# Patient Record
Sex: FEMALE | Race: WHITE | NOT HISPANIC OR LATINO | Employment: OTHER | URBAN - METROPOLITAN AREA
[De-identification: names, ages, dates, MRNs, and addresses within clinical notes are randomized per-mention and may not be internally consistent; named-entity substitution may affect disease eponyms.]

---

## 2017-01-04 ENCOUNTER — OFFICE VISIT (OUTPATIENT)
Dept: CARDIOLOGY | Facility: MEDICAL CENTER | Age: 82
End: 2017-01-04
Payer: MEDICARE

## 2017-01-04 VITALS
SYSTOLIC BLOOD PRESSURE: 142 MMHG | HEIGHT: 62 IN | OXYGEN SATURATION: 95 % | WEIGHT: 116 LBS | HEART RATE: 72 BPM | BODY MASS INDEX: 21.35 KG/M2 | DIASTOLIC BLOOD PRESSURE: 82 MMHG

## 2017-01-04 DIAGNOSIS — I50.22 CHRONIC SYSTOLIC CONGESTIVE HEART FAILURE (HCC): Chronic | ICD-10-CM

## 2017-01-04 DIAGNOSIS — N18.30 CHRONIC KIDNEY DISEASE (CKD), STAGE III (MODERATE) (HCC): ICD-10-CM

## 2017-01-04 DIAGNOSIS — I35.0 NONRHEUMATIC AORTIC VALVE STENOSIS: ICD-10-CM

## 2017-01-04 DIAGNOSIS — I42.0 DILATED CARDIOMYOPATHY (HCC): ICD-10-CM

## 2017-01-04 DIAGNOSIS — I44.30 AV BLOCK: ICD-10-CM

## 2017-01-04 DIAGNOSIS — I25.10 CORONARY ARTERY DISEASE INVOLVING NATIVE CORONARY ARTERY OF NATIVE HEART WITHOUT ANGINA PECTORIS: Chronic | ICD-10-CM

## 2017-01-04 DIAGNOSIS — I34.0 MITRAL VALVE INSUFFICIENCY, UNSPECIFIED ETIOLOGY: ICD-10-CM

## 2017-01-04 DIAGNOSIS — I47.10 SVT (SUPRAVENTRICULAR TACHYCARDIA): ICD-10-CM

## 2017-01-04 DIAGNOSIS — Z95.0 CARDIAC PACEMAKER IN SITU: ICD-10-CM

## 2017-01-04 DIAGNOSIS — R00.1 BRADYCARDIA: ICD-10-CM

## 2017-01-04 PROCEDURE — G8427 DOCREV CUR MEDS BY ELIG CLIN: HCPCS | Performed by: INTERNAL MEDICINE

## 2017-01-04 PROCEDURE — G8599 NO ASA/ANTIPLAT THER USE RNG: HCPCS | Performed by: INTERNAL MEDICINE

## 2017-01-04 PROCEDURE — G8419 CALC BMI OUT NRM PARAM NOF/U: HCPCS | Performed by: INTERNAL MEDICINE

## 2017-01-04 PROCEDURE — 1036F TOBACCO NON-USER: CPT | Performed by: INTERNAL MEDICINE

## 2017-01-04 PROCEDURE — 99214 OFFICE O/P EST MOD 30 MIN: CPT | Performed by: INTERNAL MEDICINE

## 2017-01-04 ASSESSMENT — ENCOUNTER SYMPTOMS
MUSCULOSKELETAL NEGATIVE: 1
FEVER: 0
CARDIOVASCULAR NEGATIVE: 1
ORTHOPNEA: 0
STRIDOR: 0
PND: 0
WHEEZING: 0
HEMOPTYSIS: 0
GASTROINTESTINAL NEGATIVE: 1
CONSTITUTIONAL NEGATIVE: 1
RESPIRATORY NEGATIVE: 1
COUGH: 0
EYES NEGATIVE: 1
CHILLS: 0
NEUROLOGICAL NEGATIVE: 1
DIZZINESS: 0
PALPITATIONS: 0
BRUISES/BLEEDS EASILY: 0
SORE THROAT: 0
WEAKNESS: 0
CLAUDICATION: 0
LOSS OF CONSCIOUSNESS: 0
SPUTUM PRODUCTION: 0
SHORTNESS OF BREATH: 0

## 2017-01-04 NOTE — Clinical Note
[facility]   [address] [facility] [address]  [place] [address]  Phone: [phone]  Fax: [phone]              Jess Colón  11/13/1934    Encounter Date: 1/4/2017    Terry Tatum M.D.          PROGRESS NOTE:  Subjective:   Jess Colón is a 82 y.o. female who presents today as a follow-up for CAD and aortic stenosis. Since he was last seen she underwent a pacemaker placement for heart block. Since then she's feeling significantly better energy and less shortness of breath. Last echocardiogram showed mild to moderate aortic stenosis. She's had no clinical syndrome of this with no symptoms of progression of disease. She is scheduled for repeat echocardiogram in March.    Past Medical History   Diagnosis Date   • Hypertension    • Palpitations    • Abnormal liver function test    • Pacemaker    • Indigestion    • Myocardial infarct (HCC)     • Heart valve disease      mitral valve prolapse   • SVT (supraventricular tachycardia) (HCC)      SVT/A-Flutter   • Pneumonia 1964   • Bronchitis       10-15 years ago   • Breath shortness      with exertion or outdoor allergies   • Pain       feet secondary to RA=2/10   • Heart burn    • Snoring    • Rheumatoid arthritis(714.0)    • Arthritis      Rheumatoid Arthritis x 30 years   • Dental disorder      Full dentures   • Unspecified cataract      Surgery L eye   • Anesthesia      PONV     Past Surgical History   Procedure Laterality Date   • Tonsillectomy     • Hysterectomy, vaginal     • Breast biopsy     • Gastroscopy with biopsy  5/7/2014     Performed by Darin Barros M.D. at SURGERY [facility]   • Colonoscopy with clipping  5/7/2014     Performed by Darin Barros M.D. at [facility]   • Rectocele repair     • Recovery  11/4/2014     Performed by Cath-Recovery Surgery at [facility] SAME DAY [facility]   • Recovery  4/27/2016     Procedure: CATH LAB MELVIN WITH ANESTHESIA ;   "Surgeon: Recoveryonly Surgery;  Location: SURGERY PRE-POST PROC UNIT [facility];  Service:    • Recovery  5/19/2016     Procedure: CATH LAB [facility] WITH A SHOT OF THE AORTIC ROOT DR. NAYAK;  Surgeon: Recoveryonly Surgery;  Location: SURGERY PRE-POST PROC UNIT [facility];  Service:    • Pacemaker insertion  December 2016     Medtronic Adapta [id number] implanted by Dr. Ang.     Family History   Problem Relation Age of Onset   • Heart Disease Brother      History   Smoking status   • Never Smoker    Smokeless tobacco   • Never Used     Allergies   Allergen Reactions   • Methotrexate      Pt. States it effects her liver.   • Remicade [Infliximab Injection]      Shaking/hot-flashes   • Sulfites Diarrhea   • Other Environmental Runny Nose and Itching     Plants; \"everything\"     Outpatient Encounter Prescriptions as of 1/4/2017   Medication Sig Dispense Refill   • acetaminophen (TYLENOL) 500 MG Tab Take 2 Tabs by mouth every 6 hours as needed. 21 Tab 0   • furosemide (LASIX) 20 MG Tab TAKE 1 TABLET BY MOUTH DAILY 90 Tab 3   • lisinopril (PRINIVIL) 10 MG Tab Take 1 Tab by mouth every day. 30 Tab 11   • carvedilol (COREG) 12.5 MG Tab Take 1 Tab by mouth 2 times a day, with meals. 180 Tab 3   • potassium chloride ER (KLOR-CON) 10 MEQ tablet Take 1 Tab by mouth every day. 30 Tab 11   • alendronate (FOSAMAX) 70 MG Tab Take 70 mg by mouth every 7 days. On Mon     • estropipate (OGEN) 0.75 MG Tab Take 0.75 mg by mouth every evening.     • omeprazole (PRILOSEC) 20 MG delayed-release capsule Take 20 mg by mouth every day.     • tocilizumab (ACTEMRA) 400 MG/20ML Solution 400 mg by Intravenous route Q30 DAYS.     • Polyvinyl Alcohol-Povidone (REFRESH OP) 1 Drop by Ophthalmic route as needed (For Dry eyes).     • nitroglycerin (NITROSTAT) 0.4 MG SUBL Place 1 Tab under tongue as needed for Chest Pain. 25 Tab 11   • Calcium Carbonate-Vitamin D (CALTRATE 600+D PO) Take 1 Tab by mouth 2 Times a Day.     • Multiple Vitamins-Minerals (CENTRUM SILVER " "PO) Take 1 Tab by mouth every day.     • doxycycline (VIBRAMYCIN) 100 MG Tab Take 1 Tab by mouth 2 times a day. (Patient not taking: Reported on 1/4/2017) 6 Tab 0     No facility-administered encounter medications on file as of 1/4/2017.     Review of Systems   Constitutional: Negative.  Negative for fever, chills and malaise/fatigue.   HENT: Negative.  Negative for sore throat.    Eyes: Negative.    Respiratory: Negative.  Negative for cough, hemoptysis, sputum production, shortness of breath, wheezing and stridor.    Cardiovascular: Negative.  Negative for chest pain, palpitations, orthopnea, claudication, leg swelling and PND.   Gastrointestinal: Negative.    Genitourinary: Negative.    Musculoskeletal: Negative.    Skin: Negative.    Neurological: Negative.  Negative for dizziness, loss of consciousness and weakness.   Endo/Heme/Allergies: Negative.  Does not bruise/bleed easily.   All other systems reviewed and are negative.       Objective:   /82 mmHg  Pulse 72  Ht 1.575 m (5' 2\")  Wt 52.617 kg (116 lb)  BMI 21.21 kg/m2  SpO2 95%    Physical Exam   Constitutional: She is oriented to person, place, and time. She appears well-developed and well-nourished. No distress.   HENT:   Head: Normocephalic.   Mouth/Throat: Oropharynx is clear and moist.   Eyes: EOM are normal. Pupils are equal, round, and reactive to light. Right eye exhibits no discharge. Left eye exhibits no discharge. No scleral icterus.   Neck: Normal range of motion. Neck supple. No JVD present. No tracheal deviation present.   Cardiovascular: Normal rate, regular rhythm, S1 normal, S2 normal, intact distal pulses and normal pulses.  Exam reveals no gallop, no S3, no S4 and no friction rub.    Murmur heard.   No systolic murmur is present    No diastolic murmur is present   Pulses:       Carotid pulses are 2+ on the right side, and 2+ on the left side.       Radial pulses are 2+ on the right side, and 2+ on the left side.        Dorsalis " pedis pulses are 2+ on the right side, and 2+ on the left side.        Posterior tibial pulses are 2+ on the right side, and 2+ on the left side.   Pulmonary/Chest: Effort normal and breath sounds normal. No respiratory distress. She has no wheezes. She has no rales.   Abdominal: Soft. Bowel sounds are normal. She exhibits no distension and no mass. There is no tenderness. There is no rebound and no guarding.   Musculoskeletal: She exhibits no edema.   Neurological: She is alert and oriented to person, place, and time. No cranial nerve deficit.   Skin: Skin is warm and dry. She is not diaphoretic. No pallor.   Psychiatric: She has a normal mood and affect. Her behavior is normal. Judgment and thought content normal.   Nursing note and vitals reviewed.      Assessment:     1. AV block     2. Cardiac pacemaker in situ     3. Bradycardia     4. Dilated cardiomyopathy (HCC)     5. Chronic systolic congestive heart failure (HCC)     6. Mitral valve insufficiency, unspecified etiology     7. Nonrheumatic aortic valve stenosis     8. Coronary artery disease involving native coronary artery of native heart without angina pectoris     9. SVT (supraventricular tachycardia) (HCC)     10. Chronic kidney disease (CKD), stage III (moderate)         Medical Decision Making:  Today's Assessment / Status / Plan:     81-year-old female with aortic stenosis with low flow and lowgradients a did significantly increased with dobutamine infusion.  Since her pacemaker she continues to be asymptomatic. Based on her lack of symptoms we'll see her back in 6 months for further evaluation. She will undergo a repeat echocardiogram in March.    1. CHFrEF  - cont coreg to 12.5 BID  - cont lisin 20  - cont lasix 20 once daily    2. Aortic stenosis, moderate, low flow  - moderate  - repeat echo in March    3. MR  - likely function from dilated LV    4. HB s/p PPM    - f/u EP    Thank for you allowing me to take part in your patient's care, please  call should you have any questions or would like to discuss this patient.        Nick Mercer M.D.  [address] [place] [id number]  VIA In Basket

## 2017-01-04 NOTE — MR AVS SNAPSHOT
"        Jess Colón   2017 10:45 AM   Office Visit   MRN: [id number]    Department:  [facility]   Dept Phone:  [phone]    Description:  Female : 1934   Provider:  Terry Tatum M.D.           Reason for Visit     Follow-Up           Allergies as of 2017     Allergen Noted Reactions    Methotrexate 2013       Pt. States it effects her liver.    Remicade [Infliximab Injection] 2013       Shaking/hot-flashes    Sulfites 2014   Diarrhea    Other Environmental 2014   Runny Nose, Itching    Plants; \"everything\"      You were diagnosed with     AV block   [448675]       Cardiac pacemaker in situ   [V45.01.ICD-9-CM]       Bradycardia   [206686]       Dilated cardiomyopathy (HCC)   [885431]       Chronic systolic congestive heart failure (HCC)   [280698]       Mitral valve insufficiency, unspecified etiology   [7488421]       Nonrheumatic aortic valve stenosis   [503120]       Coronary artery disease involving native coronary artery of native heart without angina pectoris   [4635380]       SVT (supraventricular tachycardia) (HCC)   [946324]       Chronic kidney disease (CKD), stage III (moderate)   [589021]         Vital Signs     Blood Pressure Pulse Height Weight Body Mass Index Oxygen Saturation    142/82 mmHg 72 1.575 m (5' 2\") 52.617 kg (116 lb) 21.21 kg/m2 95%    Smoking Status                   Never Smoker            Basic Information     Date Of Birth Sex Race Ethnicity Preferred Language    1934 Female White Non- English      Your appointments     2017 11:15 AM   PACER CHECK ONLY with PACER CHECK-[facility]   [facility] (--)    [address]  [place] [address] [phone]            Mar 20, 2017 12:15 PM   [place] with [place] [facility] EXAM 12   [place] [facility] [place]    [address] [facility] [place] [address]   [phone]           No prep            2017  4:00 PM   Heart " Failure Established with Terry Tatum M.D.   [facility] (--)    [address]  [place] [id number] [phone]              Problem List              ICD-10-CM Priority Class Noted - Resolved    Hyponatremia E87.1   4/30/2013 - Present    Anemia D64.9   5/7/2014 - Present    RA (rheumatoid arthritis) (HCC) M06.9   5/7/2014 - Present    Leukocytosis D72.829   5/7/2014 - Present    Chronic kidney disease (CKD), stage III (moderate) N18.3   5/7/2014 - Present    Hypokalemia E87.6   8/26/2014 - Present    SVT (supraventricular tachycardia) (HCC) I47.1 Medium  8/26/2014 - Present    CAD (coronary artery disease) (Chronic) I25.10   10/23/2014 - Present    Chest pain R07.9   10/30/2014 - Present    Other specified cardiac dysrhythmias I49.8   11/4/2014 - Present    Gastroesophageal reflux disease without esophagitis (Chronic) K21.9   6/3/2015 - Present    Osteoporosis M81.0   6/3/2015 - Present    Routine general medical examination at a health care facility Z00.00   12/9/2015 - Present    Essential hypertension (Chronic) I10 Low  12/9/2015 - Present    Low serum vitamin D R79.89   12/9/2015 - Present    History of MI (myocardial infarction) I25.2   12/17/2015 - Present    Aortic stenosis I35.0 High  4/14/2016 - Present    Mitral regurgitation I34.0 Medium  4/14/2016 - Present    Chronic systolic congestive heart failure (HCC) (Chronic) I50.22 High  6/1/2016 - Present    Dilated cardiomyopathy (HCC) I42.0 High  6/1/2016 - Present    Partial bowel obstruction (HCC) K56.69   9/5/2016 - Present    Bradycardia R00.1   11/29/2016 - Present    Cardiac pacemaker in situ Z95.0   12/13/2016 - Present    AV block I44.30   12/13/2016 - Present      Health Maintenance        Date Due Completion Dates    IMM DTaP/Tdap/Td Vaccine (1 - Tdap) 11/13/1953 ---    PAP SMEAR 11/13/1955 ---    IMM ZOSTER VACCINE 11/13/1994 ---    IMM PNEUMOCOCCAL 65+ (ADULT) LOW/MEDIUM RISK SERIES (1  of 2 - PCV13) 11/13/1999 ---    MAMMOGRAM 12/17/2016 12/17/2015 (Postponed)    Override on 12/17/2015: Postponed (Patient states she will schedule with her PCP.)    BONE DENSITY 12/17/2020 12/17/2015 (Postponed)    Override on 12/17/2015: Postponed (Patient will discuss scheduling test with her PCP.)    COLONOSCOPY 12/17/2025 12/17/2015 (Postponed)    Override on 12/17/2015: Postponed (Patient states she had colonoscopy while in [place] in 2014.)            Current Immunizations     Influenza TIV (IM) 10/1/2015, 10/2/2014, 10/1/2013    Influenza Vaccine Adult HD 12/6/2016  9:49 AM    Pneumococcal Vaccine (UF)Historical Data 10/1/2015, 10/1/2012      Below and/or attached are the medications your provider expects you to take. Review all of your home medications and newly ordered medications with your provider and/or pharmacist. Follow medication instructions as directed by your provider and/or pharmacist. Please keep your medication list with you and share with your provider. Update the information when medications are discontinued, doses are changed, or new medications (including over-the-counter products) are added; and carry medication information at all times in the event of emergency situations     Allergies:  METHOTREXATE - (reactions not documented)     REMICADE - (reactions not documented)     SULFITES - Diarrhea     OTHER ENVIRONMENTAL - Runny Nose,Itching               Medications  Valid as of: January 04, 2017 - 11:20 AM    Generic Name Brand Name Tablet Size Instructions for use    Acetaminophen (Tab) TYLENOL 500 MG Take 2 Tabs by mouth every 6 hours as needed.        Alendronate Sodium (Tab) FOSAMAX 70 MG Take 70 mg by mouth every 7 days. On Mon        Calcium Carbonate-Vitamin D   Take 1 Tab by mouth 2 Times a Day.        Carvedilol (Tab) COREG 12.5 MG Take 1 Tab by mouth 2 times a day, with meals.        Doxycycline Hyclate (Tab) VIBRAMYCIN 100 MG Take 1 Tab by mouth 2 times a day.        Estropipate  (Tab) OGEN 0.75 MG Take 0.75 mg by mouth every evening.        Furosemide (Tab) LASIX 20 MG TAKE 1 TABLET BY MOUTH DAILY        Lisinopril (Tab) PRINIVIL 10 MG Take 1 Tab by mouth every day.        Multiple Vitamins-Minerals   Take 1 Tab by mouth every day.        Nitroglycerin (SL Tab) NITROSTAT 0.4 MG Place 1 Tab under tongue as needed for Chest Pain.        Omeprazole (CAPSULE DELAYED RELEASE) PRILOSEC 20 MG Take 20 mg by mouth every day.        Polyvinyl Alcohol-Povidone   1 Drop by Ophthalmic route as needed (For Dry eyes).        Potassium Chloride (Tab CR) KLOR-CON 10 MEQ Take 1 Tab by mouth every day.        Tocilizumab (Solution) ACTEMRA 400 MG/20ML 400 mg by Intravenous route Q30 DAYS.        .                 Medicines prescribed today were sent to:     [facility] - [address] [facility] [address]    Phone: [phone] Fax: [phone]    Open 24 Hours?: No    [facility] [address] [place] [address] [address] [facility] [address]    Phone: [phone] Fax: [phone]    Open 24 Hours?: No      Medication refill instructions:       If your prescription bottle indicates you have medication refills left, it is not necessary to call your provider’s office. Please contact your pharmacy and they will refill your medication.    If your prescription bottle indicates you do not have any refills left, you may request refills at any time through one of the following ways: The online [employer] system (except Urgent Care), by calling your provider’s office, or by asking your pharmacy to contact your provider’s office with a refill request. Medication refills are processed only during regular business hours and may not be available until the next business day. Your provider may request additional information or to have a follow-up visit with you prior to refilling your medication.      *Please Note: Medication refills are assigned a new Rx number when refilled electronically. Your pharmacy may indicate that no refills were authorized even though a new prescription for the same medication is available at the pharmacy. Please request the medicine by name with the pharmacy before contacting your provider for a refill.           [employer] Access Code: [email or web address]  Expires: 2/3/2017 11:20 AM    [employer]  A secure, online tool to manage your health information     [employer]’s [employer]® is a secure, online tool that connects you to your personalized health information from the privacy of your home -- day or night - making it very easy for you to manage your healthcare. Once the activation process is completed, you can even access your medical information using the [employer] carina, which is available for free in the Apple Carina store or Google Play store.     [employer] provides the following levels of access (as shown below):   My Chart Features   [facility] Doctor [facility]  Specialists [facility]  Urgent  Care Non-[facility]  Primary Care  Doctor   Email your healthcare team securely and privately 24/7 X X X    Manage appointments: schedule your next appointment; view details of past/upcoming appointments X      Request prescription refills. X      View recent personal medical records, including lab and immunizations X X X X   View health record, including health history, allergies, medications X X X X   Read reports about your outpatient visits, procedures, consult and ER notes X X X X   See your discharge summary, which is a recap of your hospital and/or ER visit that includes your diagnosis, lab results, and care plan. X X       How to register for [employer]:  1. Go to  [email or web address].  2. Click on the Sign Up Now box, which takes you to the New Member Sign Up page. You will need to provide the following information:  a. Enter your [employer] Access Code exactly as it appears at the top of this  page. (You will not need to use this code after you’ve completed the sign-up process. If you do not sign up before the expiration date, you must request a new code.)   b. Enter your date of birth.   c. Enter your home email address.   d. Click Submit, and follow the next screen’s instructions.  3. Create a [employer] ID. This will be your [employer] login ID and cannot be changed, so think of one that is secure and easy to remember.  4. Create a [employer] password. You can change your password at any time.  5. Enter your Password Reset Question and Answer. This can be used at a later time if you forget your password.   6. Enter your e-mail address. This allows you to receive e-mail notifications when new information is available in [employer].  7. Click Sign Up. You can now view your health information.    For assistance activating your [employer] account, call [phone]

## 2017-01-04 NOTE — PROGRESS NOTES
Subjective:   Jess Colón is a 82 y.o. female who presents today as a follow-up for CAD and aortic stenosis. Since he was last seen she underwent a pacemaker placement for heart block. Since then she's feeling significantly better energy and less shortness of breath. Last echocardiogram showed mild to moderate aortic stenosis. She's had no clinical syndrome of this with no symptoms of progression of disease. She is scheduled for repeat echocardiogram in March.    Past Medical History   Diagnosis Date   • Hypertension    • Palpitations    • Abnormal liver function test    • Pacemaker    • Indigestion    • Myocardial infarct (HCC)     • Heart valve disease      mitral valve prolapse   • SVT (supraventricular tachycardia) ([facility])      SVT/A-Flutter   • Pneumonia 1964   • Bronchitis       10-15 years ago   • Breath shortness      with exertion or outdoor allergies   • Pain       feet secondary to RA=2/10   • Heart burn    • Snoring    • Rheumatoid arthritis(714.0)    • Arthritis      Rheumatoid Arthritis x 30 years   • Dental disorder      Full dentures   • Unspecified cataract      Surgery L eye   • Anesthesia      PONV     Past Surgical History   Procedure Laterality Date   • Tonsillectomy     • Hysterectomy, vaginal     • Breast biopsy     • Gastroscopy with biopsy  5/7/2014     Performed by Darin Barros M.D. at [facility]   • Colonoscopy with clipping  5/7/2014     Performed by Darin Barros M.D. at [facility]   • Rectocele repair     • Recovery  11/4/2014     Performed by Cath-Recovery Surgery at [facility] SAME DAY [facility] ORS   • Recovery  4/27/2016     Procedure: CATH LAB MELVIN WITH ANESTHESIA ;  Surgeon: Recoveryonly Surgery;  Location: SURGERY PRE-POST PROC UNIT [facility];  Service:    • Recovery  5/19/2016     Procedure: CATH LAB C, [facility] WITH A SHOT OF THE AORTIC ROOT DR. NAYAK;  Surgeon: Recoveryonly Surgery;  Location: SURGERY PRE-POST PROC UNIT [facility];  Service:   "  • Pacemaker insertion  December 2016     Medtronic Adapta [id number] implanted by Dr. Ang.     Family History   Problem Relation Age of Onset   • Heart Disease Brother      History   Smoking status   • Never Smoker    Smokeless tobacco   • Never Used     Allergies   Allergen Reactions   • Methotrexate      Pt. States it effects her liver.   • Remicade [Infliximab Injection]      Shaking/hot-flashes   • Sulfites Diarrhea   • Other Environmental Runny Nose and Itching     Plants; \"everything\"     Outpatient Encounter Prescriptions as of 1/4/2017   Medication Sig Dispense Refill   • acetaminophen (TYLENOL) 500 MG Tab Take 2 Tabs by mouth every 6 hours as needed. 21 Tab 0   • furosemide (LASIX) 20 MG Tab TAKE 1 TABLET BY MOUTH DAILY 90 Tab 3   • lisinopril (PRINIVIL) 10 MG Tab Take 1 Tab by mouth every day. 30 Tab 11   • carvedilol (COREG) 12.5 MG Tab Take 1 Tab by mouth 2 times a day, with meals. 180 Tab 3   • potassium chloride ER (KLOR-CON) 10 MEQ tablet Take 1 Tab by mouth every day. 30 Tab 11   • alendronate (FOSAMAX) 70 MG Tab Take 70 mg by mouth every 7 days. On Mon     • estropipate (OGEN) 0.75 MG Tab Take 0.75 mg by mouth every evening.     • omeprazole (PRILOSEC) 20 MG delayed-release capsule Take 20 mg by mouth every day.     • tocilizumab (ACTEMRA) 400 MG/20ML Solution 400 mg by Intravenous route Q30 DAYS.     • Polyvinyl Alcohol-Povidone (REFRESH OP) 1 Drop by Ophthalmic route as needed (For Dry eyes).     • nitroglycerin (NITROSTAT) 0.4 MG SUBL Place 1 Tab under tongue as needed for Chest Pain. 25 Tab 11   • Calcium Carbonate-Vitamin D (CALTRATE 600+D PO) Take 1 Tab by mouth 2 Times a Day.     • Multiple Vitamins-Minerals (CENTRUM SILVER PO) Take 1 Tab by mouth every day.     • doxycycline (VIBRAMYCIN) 100 MG Tab Take 1 Tab by mouth 2 times a day. (Patient not taking: Reported on 1/4/2017) 6 Tab 0     No facility-administered encounter medications on file as of 1/4/2017.     Review of Systems " "  Constitutional: Negative.  Negative for fever, chills and malaise/fatigue.   HENT: Negative.  Negative for sore throat.    Eyes: Negative.    Respiratory: Negative.  Negative for cough, hemoptysis, sputum production, shortness of breath, wheezing and stridor.    Cardiovascular: Negative.  Negative for chest pain, palpitations, orthopnea, claudication, leg swelling and PND.   Gastrointestinal: Negative.    Genitourinary: Negative.    Musculoskeletal: Negative.    Skin: Negative.    Neurological: Negative.  Negative for dizziness, loss of consciousness and weakness.   Endo/Heme/Allergies: Negative.  Does not bruise/bleed easily.   All other systems reviewed and are negative.       Objective:   /82 mmHg  Pulse 72  Ht 1.575 m (5' 2\")  Wt 52.617 kg (116 lb)  BMI 21.21 kg/m2  SpO2 95%    Physical Exam   Constitutional: She is oriented to person, place, and time. She appears well-developed and well-nourished. No distress.   HENT:   Head: Normocephalic.   Mouth/Throat: Oropharynx is clear and moist.   Eyes: EOM are normal. Pupils are equal, round, and reactive to light. Right eye exhibits no discharge. Left eye exhibits no discharge. No scleral icterus.   Neck: Normal range of motion. Neck supple. No JVD present. No tracheal deviation present.   Cardiovascular: Normal rate, regular rhythm, S1 normal, S2 normal, intact distal pulses and normal pulses.  Exam reveals no gallop, no S3, no S4 and no friction rub.    Murmur heard.   No systolic murmur is present    No diastolic murmur is present   Pulses:       Carotid pulses are 2+ on the right side, and 2+ on the left side.       Radial pulses are 2+ on the right side, and 2+ on the left side.        Dorsalis pedis pulses are 2+ on the right side, and 2+ on the left side.        Posterior tibial pulses are 2+ on the right side, and 2+ on the left side.   Pulmonary/Chest: Effort normal and breath sounds normal. No respiratory distress. She has no wheezes. She has no " rales.   Abdominal: Soft. Bowel sounds are normal. She exhibits no distension and no mass. There is no tenderness. There is no rebound and no guarding.   Musculoskeletal: She exhibits no edema.   Neurological: She is alert and oriented to person, place, and time. No cranial nerve deficit.   Skin: Skin is warm and dry. She is not diaphoretic. No pallor.   Psychiatric: She has a normal mood and affect. Her behavior is normal. Judgment and thought content normal.   Nursing note and vitals reviewed.      Assessment:     1. AV block     2. Cardiac pacemaker in situ     3. Bradycardia     4. Dilated cardiomyopathy (HCC)     5. Chronic systolic congestive heart failure (HCC)     6. Mitral valve insufficiency, unspecified etiology     7. Nonrheumatic aortic valve stenosis     8. Coronary artery disease involving native coronary artery of native heart without angina pectoris     9. SVT (supraventricular tachycardia) (HCC)     10. Chronic kidney disease (CKD), stage III (moderate)         Medical Decision Making:  Today's Assessment / Status / Plan:     81-year-old female with aortic stenosis with low flow and lowgradients a did significantly increased with dobutamine infusion.  Since her pacemaker she continues to be asymptomatic. Based on her lack of symptoms we'll see her back in 6 months for further evaluation. She will undergo a repeat echocardiogram in March.    1. CHFrEF  - cont coreg to 12.5 BID  - cont lisin 20  - cont lasix 20 once daily    2. Aortic stenosis, moderate, low flow  - moderate  - repeat echo in March    3. MR  - likely function from dilated LV    4. HB s/p PPM    - f/u EP    Thank for you allowing me to take part in your patient's care, please call should you have any questions or would like to discuss this patient.

## 2017-01-19 ENCOUNTER — NON-PROVIDER VISIT (OUTPATIENT)
Dept: CARDIOLOGY | Facility: MEDICAL CENTER | Age: 82
End: 2017-01-19
Payer: MEDICARE

## 2017-01-19 DIAGNOSIS — R00.1 BRADYCARDIA: ICD-10-CM

## 2017-01-19 DIAGNOSIS — I44.30 AV BLOCK: ICD-10-CM

## 2017-01-19 PROCEDURE — 93280 PM DEVICE PROGR EVAL DUAL: CPT | Performed by: INTERNAL MEDICINE

## 2017-01-19 NOTE — MR AVS SNAPSHOT
"        Jess Eldon   2017 11:15 AM   Appointment   MRN: [id number]    Department:  [facility]   Dept Phone:  [phone]    Description:  Female : 1934   Provider:  [facility]           Allergies as of 2017     Allergen Noted Reactions    Methotrexate 2013       Pt. States it effects her liver.    Remicade [Infliximab Injection] 2013       Shaking/hot-flashes    Sulfites 2014   Diarrhea    Other Environmental 2014   Runny Nose, Itching    Plants; \"everything\"      Vital Signs     Smoking Status                   Never Smoker            Basic Information     Date Of Birth Sex Race Ethnicity Preferred Language    1934 Female White Non- English      Your appointments     Mar 20, 2017 12:15 PM   ECHO with ECHO [facility] EXAM 12   ECHOCARDIOLOGY [facility] ([facility])    [address] [place] [address]   [phone]           No prep            2017  4:00 PM   Heart Failure Established with Terry Tatum M.D.   [facility] (--)    [address]  [place] [id number] [phone]              Problem List              ICD-10-CM Priority Class Noted - Resolved    Hyponatremia E87.1   2013 - Present    Anemia D64.9   2014 - Present    RA (rheumatoid arthritis) (CMS-HCC) M06.9   2014 - Present    Leukocytosis D72.829   2014 - Present    Chronic kidney disease (CKD), stage III (moderate) N18.3   2014 - Present    Hypokalemia E87.6   2014 - Present    SVT (supraventricular tachycardia) (CMS-HCC) I47.1 Medium  2014 - Present    CAD (coronary artery disease) (Chronic) I25.10   10/23/2014 - Present    Chest pain R07.9   10/30/2014 - Present    Other specified cardiac dysrhythmias I49.8   2014 - Present    Gastroesophageal reflux disease without esophagitis (Chronic) K21.9   6/3/2015 - Present    Osteoporosis M81.0   6/3/2015 - Present    Routine general medical " examination at a health care facility Z00.00   12/9/2015 - Present    Essential hypertension (Chronic) I10 Low  12/9/2015 - Present    Low serum vitamin D R79.89   12/9/2015 - Present    History of MI (myocardial infarction) I25.2   12/17/2015 - Present    Aortic stenosis I35.0 High  4/14/2016 - Present    Mitral regurgitation I34.0 Medium  4/14/2016 - Present    Chronic systolic congestive heart failure (CMS-HCC) (Chronic) I50.22 High  6/1/2016 - Present    Dilated cardiomyopathy (CMS-[facility]) I42.0 High  6/1/2016 - Present    Partial bowel obstruction (CMS-[facility]) K56.69   9/5/2016 - Present    Bradycardia R00.1   11/29/2016 - Present    Cardiac pacemaker in situ Z95.0   12/13/2016 - Present    AV block I44.30   12/13/2016 - Present      Health Maintenance        Date Due Completion Dates    IMM DTaP/Tdap/Td Vaccine (1 - Tdap) 11/13/1953 ---    PAP SMEAR 11/13/1955 ---    IMM ZOSTER VACCINE 11/13/1994 ---    IMM PNEUMOCOCCAL 65+ (ADULT) LOW/MEDIUM RISK SERIES (1 of 2 - PCV13) 11/13/1999 ---    MAMMOGRAM 12/17/2016 12/17/2015 (Postponed)    Override on 12/17/2015: Postponed (Patient states she will schedule with her PCP.)    BONE DENSITY 12/17/2020 12/17/2015 (Postponed)    Override on 12/17/2015: Postponed (Patient will discuss scheduling test with her PCP.)    COLONOSCOPY 12/17/2025 12/17/2015 (Postponed)    Override on 12/17/2015: Postponed (Patient states she had colonoscopy while in [facility] in 2014.)            Current Immunizations     Influenza TIV (IM) 10/1/2015, 10/2/2014, 10/1/2013    Influenza Vaccine Adult HD 12/6/2016  9:49 AM    Pneumococcal Vaccine (UF)Historical Data 10/1/2015, 10/1/2012      Below and/or attached are the medications your provider expects you to take. Review all of your home medications and newly ordered medications with your provider and/or pharmacist. Follow medication instructions as directed by your provider and/or pharmacist. Please keep your medication list with you and share with  your provider. Update the information when medications are discontinued, doses are changed, or new medications (including over-the-counter products) are added; and carry medication information at all times in the event of emergency situations     Allergies:  METHOTREXATE - (reactions not documented)     REMICADE - (reactions not documented)     SULFITES - Diarrhea     OTHER ENVIRONMENTAL - Runny Nose,Itching               Medications  Valid as of: January 19, 2017 - 11:27 AM    Generic Name Brand Name Tablet Size Instructions for use    Acetaminophen (Tab) TYLENOL 500 MG Take 2 Tabs by mouth every 6 hours as needed.        Alendronate Sodium (Tab) FOSAMAX 70 MG Take 70 mg by mouth every 7 days. On Mon        Calcium Carbonate-Vitamin D   Take 1 Tab by mouth 2 Times a Day.        Carvedilol (Tab) COREG 12.5 MG Take 1 Tab by mouth 2 times a day, with meals.        Doxycycline Hyclate (Tab) VIBRAMYCIN 100 MG Take 1 Tab by mouth 2 times a day.        Estropipate (Tab) OGEN 0.75 MG Take 1 tablet by mouth  every day        Furosemide (Tab) LASIX 20 MG TAKE 1 TABLET BY MOUTH DAILY        Lisinopril (Tab) PRINIVIL 10 MG Take 1 Tab by mouth every day.        Multiple Vitamins-Minerals   Take 1 Tab by mouth every day.        Nitroglycerin (SL Tab) NITROSTAT 0.4 MG Place 1 Tab under tongue as needed for Chest Pain.        Omeprazole (CAPSULE DELAYED RELEASE) PRILOSEC 20 MG Take 1 capsule by mouth  every morning on an empty  stomach        Polyvinyl Alcohol-Povidone   1 Drop by Ophthalmic route as needed (For Dry eyes).        Potassium Chloride (Tab CR) KLOR-CON 10 MEQ Take 1 Tab by mouth every day.        Tocilizumab (Solution) ACTEMRA 400 MG/20ML 400 mg by Intravenous route Q30 DAYS.        .                 Medicines prescribed today were sent to:     [facility] - [address]    [address] [place] [address] [address] [facility] [address]    Phone: [phone] Fax: [phone]    Open 24 Hours?:  No    [id number] - [place], [place] - [address] AT [facility] [address]    [address] [place] [address]    Phone: [phone] Fax: [phone]    Open 24 Hours?: No      Medication refill instructions:       If your prescription bottle indicates you have medication refills left, it is not necessary to call your provider’s office. Please contact your pharmacy and they will refill your medication.    If your prescription bottle indicates you do not have any refills left, you may request refills at any time through one of the following ways: The online eCullet system (except Urgent Care), by calling your provider’s office, or by asking your pharmacy to contact your provider’s office with a refill request. Medication refills are processed only during regular business hours and may not be available until the next business day. Your provider may request additional information or to have a follow-up visit with you prior to refilling your medication.   *Please Note: Medication refills are assigned a new Rx number when refilled electronically. Your pharmacy may indicate that no refills were authorized even though a new prescription for the same medication is available at the pharmacy. Please request the medicine by name with the pharmacy before contacting your provider for a refill.           eCullet Access Code: [email or web address]  Expires: 2/3/2017 11:20 AM    eCullet  A secure, online tool to manage your health information     [employer]’s eCullet® is a secure, online tool that connects you to your personalized health information from the privacy of your home -- day or night - making it very easy for you to manage your healthcare. Once the activation process is completed, you can even access your medical information using the eCullet suman, which is available for free in the [employer] store or Google Play store.     eCullet provides the following levels of access (as shown below):    My Chart Features   [facility] Doctor [facility]  Specialists [facility]  Urgent  Care Non-[facility]  Primary Care  Doctor   Email your healthcare team securely and privately 24/7 X X X    Manage appointments: schedule your next appointment; view details of past/upcoming appointments X      Request prescription refills. X      View recent personal medical records, including lab and immunizations X X X X   View health record, including health history, allergies, medications X X X X   Read reports about your outpatient visits, procedures, consult and ER notes X X X X   See your discharge summary, which is a recap of your hospital and/or ER visit that includes your diagnosis, lab results, and care plan. X X       How to register for [employer]:  1. Go to  [email or web address].  2. Click on the Sign Up Now box, which takes you to the New Member Sign Up page. You will need to provide the following information:  a. Enter your [employer] Access Code exactly as it appears at the top of this page. (You will not need to use this code after you’ve completed the sign-up process. If you do not sign up before the expiration date, you must request a new code.)   b. Enter your date of birth.   c. Enter your home email address.   d. Click Submit, and follow the next screen’s instructions.  3. Create a [employer] ID. This will be your [employer] login ID and cannot be changed, so think of one that is secure and easy to remember.  4. Create a [employer] password. You can change your password at any time.  5. Enter your Password Reset Question and Answer. This can be used at a later time if you forget your password.   6. Enter your e-mail address. This allows you to receive e-mail notifications when new information is available in [employer].  7. Click Sign Up. You can now view your health information.    For assistance activating your [employer] account, call [phone]

## 2017-01-19 NOTE — NON-PROVIDER
Final threshold testing today. Appropriate device function demonstrated. Wound site appears clear. See back in 6 months along with Dr. Tatum.

## 2017-03-08 ENCOUNTER — TELEPHONE (OUTPATIENT)
Dept: CARDIOLOGY | Facility: MEDICAL CENTER | Age: 82
End: 2017-03-08

## 2017-03-08 NOTE — TELEPHONE ENCOUNTER
----- Message from Sameera Rios sent at 3/8/2017  8:48 AM PST -----  Regarding: daughter wants to review her hospital records from 12/16  МАРИЯ/Sheila      Patient's daughter Virginie wants to review her medical records from her hospitalization in 12/2016. She said there are discrepancies. She can be reached at [phone] before 11am or after 3pm.

## 2017-03-08 NOTE — TELEPHONE ENCOUNTER
Spoke with them advising admission was 12/5 and dc was 12/6, PM are always overnight, yes it was likley an obs status.

## 2017-03-20 ENCOUNTER — HOSPITAL ENCOUNTER (OUTPATIENT)
Dept: CARDIOLOGY | Facility: MEDICAL CENTER | Age: 82
End: 2017-03-20
Attending: INTERNAL MEDICINE
Payer: MEDICARE

## 2017-03-20 DIAGNOSIS — I35.0 AORTIC STENOSIS: ICD-10-CM

## 2017-03-20 PROCEDURE — 93306 TTE W/DOPPLER COMPLETE: CPT

## 2017-03-20 PROCEDURE — 93306 TTE W/DOPPLER COMPLETE: CPT | Mod: 26 | Performed by: INTERNAL MEDICINE

## 2017-03-21 ENCOUNTER — TELEPHONE (OUTPATIENT)
Dept: CARDIOLOGY | Facility: MEDICAL CENTER | Age: 82
End: 2017-03-21

## 2017-03-21 LAB
LV EJECT FRACT  99904: 60
LV EJECT FRACT MOD 2C 99903: 53.25
LV EJECT FRACT MOD 4C 99902: 42.18
LV EJECT FRACT MOD BP 99901: 44.25

## 2017-03-21 NOTE — TELEPHONE ENCOUNTER
Called patient and advised her of ELYSE LOPEZ's echocardiogram interpretation. Patient verbalized understanding and is thankful for the call.    JAX BECKER

## 2017-07-04 DIAGNOSIS — I25.10 CORONARY ARTERY DISEASE INVOLVING NATIVE CORONARY ARTERY OF NATIVE HEART WITHOUT ANGINA PECTORIS: ICD-10-CM

## 2017-07-05 RX ORDER — POTASSIUM CHLORIDE 750 MG/1
TABLET, FILM COATED, EXTENDED RELEASE ORAL
Qty: 90 TAB | Refills: 3 | Status: SHIPPED | OUTPATIENT
Start: 2017-07-05 | End: 2018-07-10 | Stop reason: SDUPTHER

## 2017-07-05 RX ORDER — LISINOPRIL 10 MG/1
TABLET ORAL
Qty: 90 TAB | Refills: 3 | Status: SHIPPED | OUTPATIENT
Start: 2017-07-05 | End: 2018-04-04 | Stop reason: SDUPTHER

## 2017-07-11 ENCOUNTER — NON-PROVIDER VISIT (OUTPATIENT)
Dept: CARDIOLOGY | Facility: MEDICAL CENTER | Age: 82
End: 2017-07-11
Payer: MEDICARE

## 2017-07-11 ENCOUNTER — OFFICE VISIT (OUTPATIENT)
Dept: CARDIOLOGY | Facility: MEDICAL CENTER | Age: 82
End: 2017-07-11
Payer: MEDICARE

## 2017-07-11 VITALS
HEART RATE: 84 BPM | WEIGHT: 117 LBS | BODY MASS INDEX: 21.53 KG/M2 | SYSTOLIC BLOOD PRESSURE: 138 MMHG | OXYGEN SATURATION: 97 % | HEIGHT: 62 IN | DIASTOLIC BLOOD PRESSURE: 92 MMHG

## 2017-07-11 DIAGNOSIS — R00.1 BRADYCARDIA: ICD-10-CM

## 2017-07-11 DIAGNOSIS — D64.9 ANEMIA, UNSPECIFIED TYPE: ICD-10-CM

## 2017-07-11 DIAGNOSIS — I25.2 HISTORY OF MI (MYOCARDIAL INFARCTION): ICD-10-CM

## 2017-07-11 DIAGNOSIS — N18.30 CHRONIC KIDNEY DISEASE (CKD), STAGE III (MODERATE) (HCC): ICD-10-CM

## 2017-07-11 DIAGNOSIS — I10 ESSENTIAL HYPERTENSION: Chronic | ICD-10-CM

## 2017-07-11 DIAGNOSIS — I42.0 DILATED CARDIOMYOPATHY (HCC): ICD-10-CM

## 2017-07-11 DIAGNOSIS — K56.600 PARTIAL BOWEL OBSTRUCTION (HCC): ICD-10-CM

## 2017-07-11 DIAGNOSIS — I25.10 CORONARY ARTERY DISEASE INVOLVING NATIVE CORONARY ARTERY OF NATIVE HEART WITHOUT ANGINA PECTORIS: Chronic | ICD-10-CM

## 2017-07-11 DIAGNOSIS — I44.30 AV BLOCK: ICD-10-CM

## 2017-07-11 DIAGNOSIS — Z95.0 CARDIAC PACEMAKER IN SITU: ICD-10-CM

## 2017-07-11 DIAGNOSIS — I35.0 NONRHEUMATIC AORTIC VALVE STENOSIS: ICD-10-CM

## 2017-07-11 DIAGNOSIS — I50.22 CHRONIC SYSTOLIC CONGESTIVE HEART FAILURE (HCC): Chronic | ICD-10-CM

## 2017-07-11 PROCEDURE — 93280 PM DEVICE PROGR EVAL DUAL: CPT | Performed by: INTERNAL MEDICINE

## 2017-07-11 PROCEDURE — 99214 OFFICE O/P EST MOD 30 MIN: CPT | Mod: 25 | Performed by: INTERNAL MEDICINE

## 2017-07-11 PROCEDURE — 94620 PR PULMONARY STRESS TESTING,SIMPLE: CPT | Performed by: INTERNAL MEDICINE

## 2017-07-11 ASSESSMENT — MINNESOTA LIVING WITH HEART FAILURE QUESTIONNAIRE (MLHF)
COSTING YOU MONEY FOR MEDICAL CARE: 0
DIFFICULTY WITH RECREATIONAL PASTIMES, SPORTS, HOBBIES: 0
DIFFICULTY SLEEPING WELL AT NIGHT: 0
LOSS OF SELF CONTROL IN YOUR LIFE: 0
WORKING AROUND THE HOUSE OR YARD DIFFICULT: 0
TIRED, FATIGUED OR LOW ON ENERGY: 0
DIFFICULTY SOCIALIZING WITH FAMILY OR FRIENDS: 0
DIFFICULTY GOING AWAY FROM HOME: 0
SWELLING IN ANKLES OR LEGS: 0
EATING LESS FOODS YOU LIKE: 0
DIFFICULTY WITH SEXUAL ACTIVITIES: 0
DIFFICULTY WORKING TO EARN A LIVING: 0
FEELING LIKE A BURDEN TO FAMILY AND FRIENDS: 0
MAKING YOU SHORT OF BREATH: 0
MAKING YOU STAY IN A HOSPITAL: 0
MAKING YOU WORRY: 0
DIFFICULTY TO CONCENTRATE OR REMEMBERING THINGS: 0
HAVING TO SIT OR LIE DOWN DURING THE DAY: 0
MAKING YOU FEEL DEPRESSED: 0
TOTAL_SCORE: 0
WALKING ABOUT OR CLIMBING STAIRS DIFFICULT: 0
GIVING YOU SIDE EFFECTS FROM TREATMENTS: 0

## 2017-07-11 ASSESSMENT — ENCOUNTER SYMPTOMS
GASTROINTESTINAL NEGATIVE: 1
DIZZINESS: 0
NEUROLOGICAL NEGATIVE: 1
CARDIOVASCULAR NEGATIVE: 1
STRIDOR: 0
PALPITATIONS: 0
SHORTNESS OF BREATH: 0
ORTHOPNEA: 0
LOSS OF CONSCIOUSNESS: 0
CONSTITUTIONAL NEGATIVE: 1
HEMOPTYSIS: 0
PND: 0
COUGH: 0
MUSCULOSKELETAL NEGATIVE: 1
CLAUDICATION: 0
SPUTUM PRODUCTION: 0
FEVER: 0
RESPIRATORY NEGATIVE: 1
BRUISES/BLEEDS EASILY: 0
CHILLS: 0
SORE THROAT: 0
WEAKNESS: 0
EYES NEGATIVE: 1
WHEEZING: 0

## 2017-07-11 ASSESSMENT — 6 MINUTE WALK TEST (6MWT): TOTAL DISTANCE WALKED (METERS): 359

## 2017-07-11 NOTE — MR AVS SNAPSHOT
"Jess Colón   2017 4:00 PM   Office Visit   MRN: [id number]    Department:  [facility]   Dept Phone:  [phone]    Description:  Female : 1934   Provider:  Terry Tatum M.D.           Reason for Visit     Follow-Up           Allergies as of 2017     Allergen Noted Reactions    Methotrexate 2013       Pt. States it effects her liver.    Remicade [Infliximab Injection] 2013       Shaking/hot-flashes    Sulfites 2014   Diarrhea    Other Environmental 2014   Runny Nose, Itching    Plants; \"everything\"      Vital Signs     Blood Pressure Pulse Height Weight Body Mass Index Oxygen Saturation    138/92 mmHg 84 1.575 m (5' 2\") 53.071 kg (117 lb) 21.39 kg/m2 97%    Smoking Status                   Never Smoker            Basic Information     Date Of Birth Sex Race Ethnicity Preferred Language    1934 Female White Non- English      Problem List              ICD-10-CM Priority Class Noted - Resolved    Hyponatremia E87.1   2013 - Present    Anemia D64.9   2014 - Present    RA (rheumatoid arthritis) (CMS-HCC) M06.9   2014 - Present    Leukocytosis D72.829   2014 - Present    Chronic kidney disease (CKD), stage III (moderate) N18.3   2014 - Present    Hypokalemia E87.6   2014 - Present    SVT (supraventricular tachycardia) I47.1 Medium  2014 - Present    CAD (coronary artery disease) (Chronic) I25.10   10/23/2014 - Present    Chest pain R07.9   10/30/2014 - Present    Other specified cardiac dysrhythmias I49.8   2014 - Present    Gastroesophageal reflux disease without esophagitis (Chronic) K21.9   6/3/2015 - Present    Osteoporosis M81.0   6/3/2015 - Present    Routine general medical examination at a health care facility Z00.00   2015 - Present    Essential hypertension (Chronic) I10 Low  2015 - Present    Low serum vitamin D R79.89   2015 - Present    History of MI (myocardial infarction) " I25.2   12/17/2015 - Present    Aortic stenosis I35.0 High  4/14/2016 - Present    Mitral regurgitation I34.0 Medium  4/14/2016 - Present    Chronic systolic congestive heart failure (CMS-HCC) (Chronic) I50.22 High  6/1/2016 - Present    Dilated cardiomyopathy (CMS-HCC) I42.0 High  6/1/2016 - Present    Partial bowel obstruction (CMS-HCC) K56.69   9/5/2016 - Present    Bradycardia R00.1   11/29/2016 - Present    Cardiac pacemaker in situ Z95.0   12/13/2016 - Present    AV block I44.30   12/13/2016 - Present      Health Maintenance        Date Due Completion Dates    IMM DTaP/Tdap/Td Vaccine (1 - Tdap) 11/13/1953 ---    PAP SMEAR 11/13/1955 ---    IMM ZOSTER VACCINE 11/13/1994 ---    IMM PNEUMOCOCCAL 65+ (ADULT) LOW/MEDIUM RISK SERIES (1 of 2 - PCV13) 11/13/1999 ---    MAMMOGRAM 12/17/2016 12/17/2015 (Postponed)    Override on 12/17/2015: Postponed (Patient states she will schedule with her PCP.)    IMM INFLUENZA (1) 9/1/2017 12/6/2016, 10/1/2015, 10/2/2014, 10/1/2013    BONE DENSITY 12/17/2020 12/17/2015 (Postponed)    Override on 12/17/2015: Postponed (Patient will discuss scheduling test with her PCP.)    COLONOSCOPY 12/17/2025 12/17/2015 (Postponed), 5/7/2014    Override on 12/17/2015: Postponed (Patient states she had colonoscopy while in [facility] in 2014.)            Current Immunizations     Influenza TIV (IM) 10/1/2015, 10/2/2014, 10/1/2013    Influenza Vaccine Adult HD 12/6/2016  9:49 AM    Pneumococcal Vaccine (UF)Historical Data 10/1/2015, 10/1/2012      Below and/or attached are the medications your provider expects you to take. Review all of your home medications and newly ordered medications with your provider and/or pharmacist. Follow medication instructions as directed by your provider and/or pharmacist. Please keep your medication list with you and share with your provider. Update the information when medications are discontinued, doses are changed, or new medications (including over-the-counter  products) are added; and carry medication information at all times in the event of emergency situations     Allergies:  METHOTREXATE - (reactions not documented)     REMICADE - (reactions not documented)     SULFITES - Diarrhea     OTHER ENVIRONMENTAL - Runny Nose,Itching               Medications  Valid as of: July 11, 2017 -  4:32 PM    Generic Name Brand Name Tablet Size Instructions for use    Acetaminophen (Tab) TYLENOL 500 MG Take 2 Tabs by mouth every 6 hours as needed.        Alendronate Sodium (Tab) FOSAMAX 70 MG Take 70 mg by mouth every 7 days. On Mon        Calcium Carbonate-Vitamin D   Take 1 Tab by mouth 2 Times a Day.        Carvedilol (Tab) COREG 12.5 MG Take 1 Tab by mouth 2 times a day, with meals.        Doxycycline Hyclate (Tab) VIBRAMYCIN 100 MG Take 1 Tab by mouth 2 times a day.        Estropipate (Tab) OGEN 0.75 MG Take 1 tablet by mouth  every day        Furosemide (Tab) LASIX 20 MG TAKE 1 TABLET BY MOUTH DAILY        Lifitegrast (Solution) Lifitegrast 5 % by Ophthalmic route.        Lisinopril (Tab) PRINIVIL 10 MG Take 1 tablet by mouth  every day        Multiple Vitamins-Minerals   Take 1 Tab by mouth every day.        Nitroglycerin (SL Tab) NITROSTAT 0.4 MG Place 1 Tab under tongue as needed for Chest Pain.        Omeprazole (CAPSULE DELAYED RELEASE) PRILOSEC 20 MG Take 1 capsule by mouth  every morning on an empty  stomach        Polyvinyl Alcohol-Povidone   1 Drop by Ophthalmic route as needed (For Dry eyes).        Potassium Chloride (Tab CR) KLOR-CON 10 MEQ Take 1 tablet by mouth  every day        Tocilizumab (Solution) ACTEMRA 400 MG/20ML 400 mg by Intravenous route Q30 DAYS.        .                 Medicines prescribed today were sent to:     [facility] - [address] [facility] [address]    Phone: [phone] Fax: [phone]    Open 24 Hours?: No    [facility] [address] [place] [address]  AT [address] [place] [address]    Phone: [phone] Fax: [phone]    Open 24 Hours?: No      Medication refill instructions:       If your prescription bottle indicates you have medication refills left, it is not necessary to call your provider’s office. Please contact your pharmacy and they will refill your medication.    If your prescription bottle indicates you do not have any refills left, you may request refills at any time through one of the following ways: The online FortaTrust system (except Urgent Care), by calling your provider’s office, or by asking your pharmacy to contact your provider’s office with a refill request. Medication refills are processed only during regular business hours and may not be available until the next business day. Your provider may request additional information or to have a follow-up visit with you prior to refilling your medication.   *Please Note: Medication refills are assigned a new Rx number when refilled electronically. Your pharmacy may indicate that no refills were authorized even though a new prescription for the same medication is available at the pharmacy. Please request the medicine by name with the pharmacy before contacting your provider for a refill.           FortaTrust Access Code: [email or web address]  Expires: 8/10/2017  4:32 PM    FortaTrust  A secure, online tool to manage your health information     [employer]’s FortaTrust® is a secure, online tool that connects you to your personalized health information from the privacy of your home -- day or night - making it very easy for you to manage your healthcare. Once the activation process is completed, you can even access your medical information using the FortaTrust carina, which is available for free in the Apple Carina store or Google Play store.     FortaTrust provides the following levels of access (as shown below):   My Chart Features   [facility] Doctor [facility]  [facility]  [facility]   Email your healthcare team securely and privately 24/7 X X X    Manage appointments: schedule your next appointment; view details of past/upcoming appointments X      Request prescription refills. X      View recent personal medical records, including lab and immunizations X X X X   View health record, including health history, allergies, medications X X X X   Read reports about your outpatient visits, procedures, consult and ER notes X X X X   See your discharge summary, which is a recap of your hospital and/or ER visit that includes your diagnosis, lab results, and care plan. X X       How to register for Shopear:  1. Go to  [email or web address].  2. Click on the Sign Up Now box, which takes you to the New Member Sign Up page. You will need to provide the following information:  a. Enter your Shopear Access Code exactly as it appears at the top of this page. (You will not need to use this code after you’ve completed the sign-up process. If you do not sign up before the expiration date, you must request a new code.)   b. Enter your date of birth.   c. Enter your home email address.   d. Click Submit, and follow the next screen’s instructions.  3. Create a Shopear ID. This will be your Shopear login ID and cannot be changed, so think of one that is secure and easy to remember.  4. Create a Shopear password. You can change your password at any time.  5. Enter your Password Reset Question and Answer. This can be used at a later time if you forget your password.   6. Enter your e-mail address. This allows you to receive e-mail notifications when new information is available in Shopear.  7. Click Sign Up. You can now view your health information.    For assistance activating your Shopear account, call [phone]

## 2017-07-11 NOTE — PROGRESS NOTES
Subjective:   Jess Colón is a 82 y.o. female who presents today as a follow-up for her aortic stenosis chronic kidney disease and CAD. She continues to be active in her yard placing 26 bricks to make a garden area for her. She's been having no functional limitations including chest pain palpitations PND or shortness of breath. She only occasionally gets lower extremity edema which is well managed. Otherwise feeling better than she has a long period of time.    Past Medical History   Diagnosis Date   • Hypertension    • Palpitations    • Abnormal liver function test    • Pacemaker    • Indigestion    • Myocardial infarct (CMS-HCC)     • Heart valve disease      mitral valve prolapse   • SVT (supraventricular tachycardia)      SVT/A-Flutter   • Pneumonia 1964   • Bronchitis       10-15 years ago   • Breath shortness      with exertion or outdoor allergies   • Pain       feet secondary to RA=2/10   • Heart burn    • Snoring    • Rheumatoid arthritis (CMS-HCC)    • Arthritis      Rheumatoid Arthritis x 30 years   • Dental disorder      Full dentures   • Unspecified cataract      Surgery L eye   • Anesthesia      PONV     Past Surgical History   Procedure Laterality Date   • Tonsillectomy     • Hysterectomy, vaginal     • Breast biopsy     • Gastroscopy with biopsy  5/7/2014     Performed by Darin Barros M.D. at SURGERY [facility]   • Colonoscopy with clipping  5/7/2014     Performed by Darin Barros M.D. at [facility]   • Rectocele repair     • Recovery  11/4/2014     Performed by Cath-Recovery Surgery at [facility] SAME DAY [facility]   • Recovery  4/27/2016     Procedure: CATH LAB MELVIN WITH ANESTHESIA ;  Surgeon: Recoveryonly Surgery;  Location: SURGERY PRE-POST PROC UNIT [facility];  Service:    • Recovery  5/19/2016     Procedure: CATH LAB C, [facility] WITH A SHOT OF THE AORTIC ROOT DR. NAYAK;  Surgeon: Recoveryonly Surgery;  Location: SURGERY PRE-POST PROC UNIT [facility];  Service:    •  "Pacemaker insertion  December 2016     Medtronic Adapta [id number] implanted by Dr. Ang.     Family History   Problem Relation Age of Onset   • Heart Disease Brother      History   Smoking status   • Never Smoker    Smokeless tobacco   • Never Used     Allergies   Allergen Reactions   • Methotrexate      Pt. States it effects her liver.   • Remicade [Infliximab Injection]      Shaking/hot-flashes   • Sulfites Diarrhea   • Other Environmental Runny Nose and Itching     Plants; \"everything\"     Outpatient Encounter Prescriptions as of 7/11/2017   Medication Sig Dispense Refill   • Lifitegrast (XIIDRA) 5 % Solution by Ophthalmic route.     • lisinopril (PRINIVIL) 10 MG Tab Take 1 tablet by mouth  every day 90 Tab 3   • potassium chloride ER (KLOR-CON) 10 MEQ tablet Take 1 tablet by mouth  every day 90 Tab 3   • omeprazole (PRILOSEC) 20 MG delayed-release capsule Take 1 capsule by mouth  every morning on an empty  stomach 90 Cap 3   • estropipate (OGEN) 0.75 MG Tab Take 1 tablet by mouth  every day 90 Tab 3   • acetaminophen (TYLENOL) 500 MG Tab Take 2 Tabs by mouth every 6 hours as needed. 21 Tab 0   • furosemide (LASIX) 20 MG Tab TAKE 1 TABLET BY MOUTH DAILY 90 Tab 3   • carvedilol (COREG) 12.5 MG Tab Take 1 Tab by mouth 2 times a day, with meals. 180 Tab 3   • alendronate (FOSAMAX) 70 MG Tab Take 70 mg by mouth every 7 days. On Mon     • tocilizumab (ACTEMRA) 400 MG/20ML Solution 400 mg by Intravenous route Q30 DAYS.     • Polyvinyl Alcohol-Povidone (REFRESH OP) 1 Drop by Ophthalmic route as needed (For Dry eyes).     • nitroglycerin (NITROSTAT) 0.4 MG SUBL Place 1 Tab under tongue as needed for Chest Pain. 25 Tab 11   • Calcium Carbonate-Vitamin D (CALTRATE 600+D PO) Take 1 Tab by mouth 2 Times a Day.     • Multiple Vitamins-Minerals (CENTRUM SILVER PO) Take 1 Tab by mouth every day.     • doxycycline (VIBRAMYCIN) 100 MG Tab Take 1 Tab by mouth 2 times a day. (Patient not taking: Reported on 1/4/2017) 6 Tab 0     No " "facility-administered encounter medications on file as of 7/11/2017.     Review of Systems   Constitutional: Negative.  Negative for fever, chills and malaise/fatigue.   HENT: Negative.  Negative for sore throat.    Eyes: Negative.    Respiratory: Negative.  Negative for cough, hemoptysis, sputum production, shortness of breath, wheezing and stridor.    Cardiovascular: Negative.  Negative for chest pain, palpitations, orthopnea, claudication, leg swelling and PND.   Gastrointestinal: Negative.    Genitourinary: Negative.    Musculoskeletal: Negative.    Skin: Negative.    Neurological: Negative.  Negative for dizziness, loss of consciousness and weakness.   Endo/Heme/Allergies: Negative.  Does not bruise/bleed easily.   All other systems reviewed and are negative.       Objective:   /92 mmHg  Pulse 84  Ht 1.575 m (5' 2\")  Wt 53.071 kg (117 lb)  BMI 21.39 kg/m2  SpO2 97%    Physical Exam   Constitutional: She is oriented to person, place, and time. She appears well-developed and well-nourished. No distress.   HENT:   Head: Normocephalic.   Mouth/Throat: Oropharynx is clear and moist.   Eyes: EOM are normal. Pupils are equal, round, and reactive to light. Right eye exhibits no discharge. Left eye exhibits no discharge. No scleral icterus.   Neck: Normal range of motion. Neck supple. No JVD present. No tracheal deviation present.   Cardiovascular: Normal rate, regular rhythm, S1 normal, S2 normal, intact distal pulses and normal pulses.  Exam reveals no gallop, no S3, no S4 and no friction rub.    Murmur heard.   Systolic murmur is present with a grade of 2/6    No diastolic murmur is present   Pulses:       Carotid pulses are 2+ on the right side, and 2+ on the left side.       Radial pulses are 2+ on the right side, and 2+ on the left side.        Dorsalis pedis pulses are 2+ on the right side, and 2+ on the left side.        Posterior tibial pulses are 2+ on the right side, and 2+ on the left side.   Loud " murmur at the right upper sternal border with preserved A2   Pulmonary/Chest: Effort normal and breath sounds normal. No respiratory distress. She has no wheezes. She has no rales.   Abdominal: Soft. Bowel sounds are normal. She exhibits no distension and no mass. There is no tenderness. There is no rebound and no guarding.   Musculoskeletal: She exhibits no edema.   Neurological: She is alert and oriented to person, place, and time. No cranial nerve deficit.   Skin: Skin is warm and dry. She is not diaphoretic. No pallor.   Psychiatric: She has a normal mood and affect. Her behavior is normal. Judgment and thought content normal.   Nursing note and vitals reviewed.      Assessment:     1. Anemia, unspecified type     2. Nonrheumatic aortic valve stenosis     3. Bradycardia     4. Coronary artery disease involving native coronary artery of native heart without angina pectoris     5. Cardiac pacemaker in situ     6. Chronic kidney disease (CKD), stage III (moderate)     7. Chronic systolic congestive heart failure (CMS-HCC)     8. Dilated cardiomyopathy (CMS-HCC)     9. Essential hypertension     10. History of MI (myocardial infarction)     11. Partial bowel obstruction (CMS-HCC)         Medical Decision Making:  Today's Assessment / Status / Plan:       82-year-old female with aortic stenosis with low flow and lowgradients a did significantly increased with dobutamine infusion.  Since her pacemaker she continues to be asymptomatic. Symptomatically she continues to do well with good functional status. We will see her back in 6 months.    1. CHFrEF  - cont coreg to 12.5 BID  - cont lisin 20  - cont lasix 20 once daily    2. Aortic stenosis, moderate, low flow  - moderate  - repeat echo in one year    3. MR  - likely function from dilated LV    4. HB s/p PPM    - f/u EP    Thank for you allowing me to take part in your patient's care, please call should you have any questions or would like to discuss this patient.

## 2017-07-11 NOTE — Clinical Note
[facility]   [address]  [place] [address]  Phone: [phone]  Fax: [phone]              Jess Colón  11/13/1934    Encounter Date: 7/11/2017    Terry Tatum M.D.          PROGRESS NOTE:  Subjective:   Jess Colón is a 82 y.o. female who presents today as a follow-up for her aortic stenosis chronic kidney disease and CAD. She continues to be active in her yard placing 26 bricks to make a garden area for her. She's been having no functional limitations including chest pain palpitations PND or shortness of breath. She only occasionally gets lower extremity edema which is well managed. Otherwise feeling better than she has a long period of time.    Past Medical History   Diagnosis Date   • Hypertension    • Palpitations    • Abnormal liver function test    • Pacemaker    • Indigestion    • Myocardial infarct (CMS-HCC)     • Heart valve disease      mitral valve prolapse   • SVT (supraventricular tachycardia)      SVT/A-Flutter   • Pneumonia 1964   • Bronchitis       10-15 years ago   • Breath shortness      with exertion or outdoor allergies   • Pain       feet secondary to RA=2/10   • Heart burn    • Snoring    • Rheumatoid arthritis (CMS-HCC)    • Arthritis      Rheumatoid Arthritis x 30 years   • Dental disorder      Full dentures   • Unspecified cataract      Surgery L eye   • Anesthesia      PONV     Past Surgical History   Procedure Laterality Date   • Tonsillectomy     • Hysterectomy, vaginal     • Breast biopsy     • Gastroscopy with biopsy  5/7/2014     Performed by Darin Barros M.D. at SURGERY [facility]   • Colonoscopy with clipping  5/7/2014     Performed by Darin Barros M.D. at [facility]   • Rectocele repair     • Recovery  11/4/2014     Performed by Cath-Recovery Surgery at [facility] SAME DAY [facility]   • Recovery  4/27/2016     Procedure: CATH LAB MELVIN WITH ANESTHESIA ;   "Surgeon: Recoveryonly Surgery;  [place] [facility]  Service:    • Recovery  5/19/2016     Procedure: CATH LAB [facility] WITH A SHOT OF THE AORTIC ROOT DR. NAYAK;  Surgeon: Recoveryonly Surgery;  [place] [facility]  Service:    • Pacemaker insertion  December 2016     Medtronic Adapta [id number] implanted by Dr. Ang.     Family History   Problem Relation Age of Onset   • Heart Disease Brother      History   Smoking status   • Never Smoker    Smokeless tobacco   • Never Used     Allergies   Allergen Reactions   • Methotrexate      Pt. States it effects her liver.   • Remicade [Infliximab Injection]      Shaking/hot-flashes   • Sulfites Diarrhea   • Other Environmental Runny Nose and Itching     Plants; \"everything\"     Outpatient Encounter Prescriptions as of 7/11/2017   Medication Sig Dispense Refill   • Lifitegrast (XIIDRA) 5 % Solution by Ophthalmic route.     • lisinopril (PRINIVIL) 10 MG Tab Take 1 tablet by mouth  every day 90 Tab 3   • potassium chloride ER (KLOR-CON) 10 MEQ tablet Take 1 tablet by mouth  every day 90 Tab 3   • omeprazole (PRILOSEC) 20 MG delayed-release capsule Take 1 capsule by mouth  every morning on an empty  stomach 90 Cap 3   • estropipate (OGEN) 0.75 MG Tab Take 1 tablet by mouth  every day 90 Tab 3   • acetaminophen (TYLENOL) 500 MG Tab Take 2 Tabs by mouth every 6 hours as needed. 21 Tab 0   • furosemide (LASIX) 20 MG Tab TAKE 1 TABLET BY MOUTH DAILY 90 Tab 3   • carvedilol (COREG) 12.5 MG Tab Take 1 Tab by mouth 2 times a day, with meals. 180 Tab 3   • alendronate (FOSAMAX) 70 MG Tab Take 70 mg by mouth every 7 days. On Mon     • tocilizumab (ACTEMRA) 400 MG/20ML Solution 400 mg by Intravenous route Q30 DAYS.     • Polyvinyl Alcohol-Povidone (REFRESH OP) 1 Drop by Ophthalmic route as needed (For Dry eyes).     • nitroglycerin (NITROSTAT) 0.4 MG SUBL Place 1 Tab under tongue as needed for Chest Pain. 25 Tab 11   • Calcium Carbonate-Vitamin D " "(CALTRATE 600+D PO) Take 1 Tab by mouth 2 Times a Day.     • Multiple Vitamins-Minerals (CENTRUM SILVER PO) Take 1 Tab by mouth every day.     • doxycycline (VIBRAMYCIN) 100 MG Tab Take 1 Tab by mouth 2 times a day. (Patient not taking: Reported on 1/4/2017) 6 Tab 0     No facility-administered encounter medications on file as of 7/11/2017.     Review of Systems   Constitutional: Negative.  Negative for fever, chills and malaise/fatigue.   HENT: Negative.  Negative for sore throat.    Eyes: Negative.    Respiratory: Negative.  Negative for cough, hemoptysis, sputum production, shortness of breath, wheezing and stridor.    Cardiovascular: Negative.  Negative for chest pain, palpitations, orthopnea, claudication, leg swelling and PND.   Gastrointestinal: Negative.    Genitourinary: Negative.    Musculoskeletal: Negative.    Skin: Negative.    Neurological: Negative.  Negative for dizziness, loss of consciousness and weakness.   Endo/Heme/Allergies: Negative.  Does not bruise/bleed easily.   All other systems reviewed and are negative.       Objective:   /92 mmHg  Pulse 84  Ht 1.575 m (5' 2\")  Wt 53.071 kg (117 lb)  BMI 21.39 kg/m2  SpO2 97%    Physical Exam   Constitutional: She is oriented to person, place, and time. She appears well-developed and well-nourished. No distress.   HENT:   Head: Normocephalic.   Mouth/Throat: Oropharynx is clear and moist.   Eyes: EOM are normal. Pupils are equal, round, and reactive to light. Right eye exhibits no discharge. Left eye exhibits no discharge. No scleral icterus.   Neck: Normal range of motion. Neck supple. No JVD present. No tracheal deviation present.   Cardiovascular: Normal rate, regular rhythm, S1 normal, S2 normal, intact distal pulses and normal pulses.  Exam reveals no gallop, no S3, no S4 and no friction rub.    Murmur heard.   Systolic murmur is present with a grade of 2/6    No diastolic murmur is present   Pulses:       Carotid pulses are 2+ on the " right side, and 2+ on the left side.       Radial pulses are 2+ on the right side, and 2+ on the left side.        Dorsalis pedis pulses are 2+ on the right side, and 2+ on the left side.        Posterior tibial pulses are 2+ on the right side, and 2+ on the left side.   Loud murmur at the right upper sternal border with preserved A2   Pulmonary/Chest: Effort normal and breath sounds normal. No respiratory distress. She has no wheezes. She has no rales.   Abdominal: Soft. Bowel sounds are normal. She exhibits no distension and no mass. There is no tenderness. There is no rebound and no guarding.   Musculoskeletal: She exhibits no edema.   Neurological: She is alert and oriented to person, place, and time. No cranial nerve deficit.   Skin: Skin is warm and dry. She is not diaphoretic. No pallor.   Psychiatric: She has a normal mood and affect. Her behavior is normal. Judgment and thought content normal.   Nursing note and vitals reviewed.      Assessment:     1. Anemia, unspecified type     2. Nonrheumatic aortic valve stenosis     3. Bradycardia     4. Coronary artery disease involving native coronary artery of native heart without angina pectoris     5. Cardiac pacemaker in situ     6. Chronic kidney disease (CKD), stage III (moderate)     7. Chronic systolic congestive heart failure (CMS-HCC)     8. Dilated cardiomyopathy (CMS-HCC)     9. Essential hypertension     10. History of MI (myocardial infarction)     11. Partial bowel obstruction (CMS-HCC)         Medical Decision Making:  Today's Assessment / Status / Plan:       82-year-old female with aortic stenosis with low flow and lowgradients a did significantly increased with dobutamine infusion.  Since her pacemaker she continues to be asymptomatic. Symptomatically she continues to do well with good functional status. We will see her back in 6 months.    1. CHFrEF  - cont coreg to 12.5 BID  - cont lisin 20  - cont lasix 20 once daily    2. Aortic stenosis,  moderate, low flow  - moderate  - repeat echo in one year    3. MR  - likely function from dilated LV    4. HB s/p PPM    - f/u EP    Thank for you allowing me to take part in your patient's care, please call should you have any questions or would like to discuss this patient.      Nick Mercer M.D.  [address] [address]  VIA In Basket

## 2017-07-21 ENCOUNTER — OFFICE VISIT (OUTPATIENT)
Dept: MEDICAL GROUP | Facility: MEDICAL CENTER | Age: 82
End: 2017-07-21
Payer: MEDICARE

## 2017-07-21 VITALS
HEIGHT: 62 IN | OXYGEN SATURATION: 96 % | WEIGHT: 113 LBS | BODY MASS INDEX: 20.8 KG/M2 | DIASTOLIC BLOOD PRESSURE: 78 MMHG | RESPIRATION RATE: 16 BRPM | TEMPERATURE: 98.4 F | SYSTOLIC BLOOD PRESSURE: 134 MMHG | HEART RATE: 82 BPM

## 2017-07-21 DIAGNOSIS — M05.79 RHEUMATOID ARTHRITIS INVOLVING MULTIPLE SITES WITH POSITIVE RHEUMATOID FACTOR (HCC): ICD-10-CM

## 2017-07-21 DIAGNOSIS — K21.9 GASTROESOPHAGEAL REFLUX DISEASE WITHOUT ESOPHAGITIS: Chronic | ICD-10-CM

## 2017-07-21 DIAGNOSIS — L57.0 ACTINIC KERATOSIS: ICD-10-CM

## 2017-07-21 DIAGNOSIS — Z95.0 CARDIAC PACEMAKER IN SITU: ICD-10-CM

## 2017-07-21 DIAGNOSIS — I10 ESSENTIAL HYPERTENSION: Chronic | ICD-10-CM

## 2017-07-21 DIAGNOSIS — N18.30 CHRONIC KIDNEY DISEASE (CKD), STAGE III (MODERATE) (HCC): ICD-10-CM

## 2017-07-21 DIAGNOSIS — Z00.00 ROUTINE GENERAL MEDICAL EXAMINATION AT A HEALTH CARE FACILITY: ICD-10-CM

## 2017-07-21 DIAGNOSIS — I25.10 CORONARY ARTERY DISEASE INVOLVING NATIVE CORONARY ARTERY OF NATIVE HEART WITHOUT ANGINA PECTORIS: Chronic | ICD-10-CM

## 2017-07-21 DIAGNOSIS — M81.0 AGE-RELATED OSTEOPOROSIS WITHOUT CURRENT PATHOLOGICAL FRACTURE: ICD-10-CM

## 2017-07-21 DIAGNOSIS — I35.0 NONRHEUMATIC AORTIC VALVE STENOSIS: ICD-10-CM

## 2017-07-21 PROCEDURE — 99214 OFFICE O/P EST MOD 30 MIN: CPT | Performed by: INTERNAL MEDICINE

## 2017-07-21 ASSESSMENT — PATIENT HEALTH QUESTIONNAIRE - PHQ9: CLINICAL INTERPRETATION OF PHQ2 SCORE: 0

## 2017-07-21 NOTE — MR AVS SNAPSHOT
"Jess Colón   2017 2:20 PM   Office Visit   MRN: [id number]    Department:  [address] [place] [address]   Dept Phone:  [phone]    Description:  Female : 1934   Provider:  Nick Mercer M.D.           Reason for Visit     Follow-Up marks on arm, lab request      Allergies as of 2017     Allergen Noted Reactions    Methotrexate 2013       Pt. States it effects her liver.    Remicade [Infliximab Injection] 2013       Shaking/hot-flashes    Sulfites 2014   Diarrhea    Other Environmental 2014   Runny Nose, Itching    Plants; \"everything\"      You were diagnosed with     Actinic keratosis   [702.0.ICD-9-CM]       Essential hypertension   [7853552]         Vital Signs     Blood Pressure Pulse Temperature Respirations Height Weight    134/78 mmHg 82 36.9 °C (98.4 °F) 16 1.575 m (5' 2.01\") 51.256 kg (113 lb)    Body Mass Index Oxygen Saturation Smoking Status             20.66 kg/m2 96% Never Smoker          Basic Information     Date Of Birth Sex Race Ethnicity Preferred Language    1934 Female White Non- English      Your appointments     2018  2:15 PM   PACER CHECK ONLY with PACER CHECK-[facility] B   [facility] (--)    [address] [place] [address] [address]  [facility] [address]   [phone]            2018  3:00 PM   Heart Failure Established with Terry Tatum M.D.   [facility] (--)    [address] [place] [address] [address]  [place] [address]   [phone]              Problem List              ICD-10-CM Priority Class Noted - Resolved    Hyponatremia E87.1   2013 - Present    Anemia D64.9   2014 - Present    RA (rheumatoid arthritis) (CMS-HCC) M06.9   2014 - Present    Leukocytosis D72.829   2014 - Present    Chronic kidney disease (CKD), stage III (moderate) N18.3   2014 - Present    Hypokalemia E87.6   2014 - Present    SVT (supraventricular tachycardia) " I47.1 Medium  8/26/2014 - Present    CAD (coronary artery disease) (Chronic) I25.10   10/23/2014 - Present    Chest pain R07.9   10/30/2014 - Present    Other specified cardiac dysrhythmias I49.8   11/4/2014 - Present    Gastroesophageal reflux disease without esophagitis (Chronic) K21.9   6/3/2015 - Present    Osteoporosis M81.0   6/3/2015 - Present    Routine general medical examination at a health care facility Z00.00   12/9/2015 - Present    Essential hypertension (Chronic) I10 Low  12/9/2015 - Present    Low serum vitamin D R79.89   12/9/2015 - Present    History of MI (myocardial infarction) I25.2   12/17/2015 - Present    Aortic stenosis I35.0 High  4/14/2016 - Present    Mitral regurgitation I34.0 Medium  4/14/2016 - Present    Chronic systolic congestive heart failure (CMS-HCC) (Chronic) I50.22 High  6/1/2016 - Present    Dilated cardiomyopathy (CMS-HCC) I42.0 High  6/1/2016 - Present    Partial bowel obstruction (CMS-HCC) K56.69   9/5/2016 - Present    Bradycardia R00.1   11/29/2016 - Present    Cardiac pacemaker in situ Z95.0   12/13/2016 - Present    AV block I44.30   12/13/2016 - Present      Health Maintenance        Date Due Completion Dates    IMM DTaP/Tdap/Td Vaccine (1 - Tdap) 11/13/1953 ---    IMM ZOSTER VACCINE 11/13/1994 ---    IMM PNEUMOCOCCAL 65+ (ADULT) LOW/MEDIUM RISK SERIES (1 of 2 - PCV13) 11/13/1999 ---    MAMMOGRAM 12/17/2016 12/17/2015 (Postponed)    Override on 12/17/2015: Postponed (Patient states she will schedule with her PCP.)    IMM INFLUENZA (1) 9/1/2017 12/6/2016, 10/1/2015, 10/2/2014, 10/1/2013    BONE DENSITY 12/17/2020 12/17/2015 (Postponed)    Override on 12/17/2015: Postponed (Patient will discuss scheduling test with her PCP.)    COLONOSCOPY 12/17/2025 12/17/2015 (Postponed), 5/7/2014    Override on 12/17/2015: Postponed (Patient states she had colonoscopy while in [facility] in 2014.)            Current Immunizations     Influenza TIV (IM) 10/1/2015, 10/2/2014, 10/1/2013     Influenza Vaccine Adult HD 12/6/2016  9:49 AM    Pneumococcal Vaccine (UF)Historical Data 10/1/2015, 10/1/2012      Below and/or attached are the medications your provider expects you to take. Review all of your home medications and newly ordered medications with your provider and/or pharmacist. Follow medication instructions as directed by your provider and/or pharmacist. Please keep your medication list with you and share with your provider. Update the information when medications are discontinued, doses are changed, or new medications (including over-the-counter products) are added; and carry medication information at all times in the event of emergency situations     Allergies:  METHOTREXATE - (reactions not documented)     REMICADE - (reactions not documented)     SULFITES - Diarrhea     OTHER ENVIRONMENTAL - Runny Nose,Itching               Medications  Valid as of: July 21, 2017 -  2:59 PM    Generic Name Brand Name Tablet Size Instructions for use    Acetaminophen (Tab) TYLENOL 500 MG Take 2 Tabs by mouth every 6 hours as needed.        Alendronate Sodium (Tab) FOSAMAX 70 MG Take 70 mg by mouth every 7 days. On Mon        Calcium Carbonate-Vitamin D   Take 1 Tab by mouth 2 Times a Day.        Carvedilol (Tab) COREG 12.5 MG Take 1 Tab by mouth 2 times a day, with meals.        CycloSPORINE   1 Drop by Ophthalmic route every morning. In each eye        Doxycycline Hyclate (Tab) VIBRAMYCIN 100 MG Take 1 Tab by mouth 2 times a day.        Estropipate (Tab) OGEN 0.75 MG Take 1 tablet by mouth  every day        Furosemide (Tab) LASIX 20 MG TAKE 1 TABLET BY MOUTH DAILY        Lifitegrast (Solution) Lifitegrast 5 % by Ophthalmic route.        Lisinopril (Tab) PRINIVIL 10 MG Take 1 tablet by mouth  every day        Multiple Vitamins-Minerals   Take 1 Tab by mouth every day.        Nitroglycerin (SL Tab) NITROSTAT 0.4 MG Place 1 Tab under tongue as needed for Chest Pain.        Omeprazole (CAPSULE DELAYED RELEASE)  PRILOSEC 20 MG Take 1 capsule by mouth  every morning on an empty  stomach        Polyvinyl Alcohol-Povidone   1 Drop by Ophthalmic route as needed (For Dry eyes).        Potassium Chloride (Tab CR) KLOR-CON 10 MEQ Take 1 tablet by mouth  every day        Tocilizumab (Solution) ACTEMRA 400 MG/20ML 400 mg by Intravenous route Q30 DAYS.        .                 Medicines prescribed today were sent to:     [facility] - [address]    [address] [place] [address] [address] [facility] [address]    Phone: [phone] Fax: [phone]    Open 24 Hours?: No    [facility] [address] [place] [address], [place] - [address] [place] [address] [address] [facility] [address]    Phone: [phone] Fax: [phone]    Open 24 Hours?: No      Medication refill instructions:       If your prescription bottle indicates you have medication refills left, it is not necessary to call your provider’s office. Please contact your pharmacy and they will refill your medication.    If your prescription bottle indicates you do not have any refills left, you may request refills at any time through one of the following ways: The online [employer] system (except Urgent Care), by calling your provider’s office, or by asking your pharmacy to contact your provider’s office with a refill request. Medication refills are processed only during regular business hours and may not be available until the next business day. Your provider may request additional information or to have a follow-up visit with you prior to refilling your medication.   *Please Note: Medication refills are assigned a new Rx number when refilled electronically. Your pharmacy may indicate that no refills were authorized even though a new prescription for the same medication is available at the pharmacy. Please request the medicine by name with the pharmacy before contacting your provider for a refill.        Your To Do List      Future Labs/Procedures Complete By Expires    BASIC METABOLIC PANEL  As directed 7/22/2018      Referral     A referral request has been sent to our patient care coordination department. Please allow 3-5 business days for us to process this request and contact you either by phone or mail. If you do not hear from us by the 5th business day, please call us at [phone].           [employer] Access Code: [email or web address]  Expires: 8/10/2017  4:32 PM    [employer]  A secure, online tool to manage your health information     [email or web address]’s [employer]® is a secure, online tool that connects you to your personalized health information from the privacy of your home -- day or night - making it very easy for you to manage your healthcare. Once the activation process is completed, you can even access your medical information using the [employer] carina, which is available for free in the [employer] store or Google Play store.     [employer] provides the following levels of access (as shown below):   My Chart Features   [facility] Primary [facility] Doctor [facility]  Specialists [facility]  Urgent  Care [facility]  Primary Care  Doctor   Email your healthcare team securely and privately 24/7 X X X    Manage appointments: schedule your next appointment; view details of past/upcoming appointments X      Request prescription refills. X      View recent personal medical records, including lab and immunizations X X X X   View health record, including health history, allergies, medications X X X X   Read reports about your outpatient visits, procedures, consult and ER notes X X X X   See your discharge summary, which is a recap of your hospital and/or ER visit that includes your diagnosis, lab results, and care plan. X X       How to register for [employer]:  1. Go to  [email or web address].  2. Click on the Sign Up Now box, which takes you to the New Member Sign Up page. You will need to provide the following information:  a. Enter your [employer] Access Code  exactly as it appears at the top of this page. (You will not need to use this code after you’ve completed the sign-up process. If you do not sign up before the expiration date, you must request a new code.)   b. Enter your date of birth.   c. Enter your home email address.   d. Click Submit, and follow the next screen’s instructions.  3. Create a [employer] ID. This will be your [employer] login ID and cannot be changed, so think of one that is secure and easy to remember.  4. Create a [employer] password. You can change your password at any time.  5. Enter your Password Reset Question and Answer. This can be used at a later time if you forget your password.   6. Enter your e-mail address. This allows you to receive e-mail notifications when new information is available in [employer].  7. Click Sign Up. You can now view your health information.    For assistance activating your [employer] account, call [phone]

## 2017-07-22 NOTE — ASSESSMENT & PLAN NOTE
She reports that her rheumatoid arthritis is under fairly good control and followed closely by Dr. Robb.

## 2017-07-22 NOTE — ASSESSMENT & PLAN NOTE
She does have chronic renal insufficiency with most recent GFR 54. She tries to remain well hydrated.

## 2017-07-22 NOTE — PROGRESS NOTES
Subjective:     Chief Complaint   Patient presents with   • Follow-Up     marks on arm, lab request     Jess Colón is a 82 y.o. female here today for follow-up of her various health problems.    Actinic keratosis  She returns today to have some lesions on her forearms checked. She does have a fair amount of sun damage and several actinic keratoses. She also has a white spot behind her right ear that she attributes to sleeping on her hair roller. She wants to see a dermatologist.    Aortic stenosis  She was getting ready for a TAVR procedure but her cardiac function improved enough that she does not seem to need it at this time.    CAD (coronary artery disease)  She has a history of coronary artery disease. She denies any recent chest pain.    Cardiac pacemaker in situ  She had the pacemaker placed in December. Since then she said she has more energy and generally feels better.    Chronic kidney disease (CKD), stage III (moderate)  She does have chronic renal insufficiency with most recent GFR 54. She tries to remain well hydrated.    Essential hypertension  Blood pressure is satisfactory on lisinopril, furosemide, and Coreg.    Gastroesophageal reflux disease without esophagitis  Her gastroesophageal reflux symptoms are under fairly good control. She does take Prilosec fairly regularly.    Osteoporosis  She continues Fosamax for her osteoporosis. She denies significant side effects.    RA (rheumatoid arthritis)  She reports that her rheumatoid arthritis is under fairly good control and followed closely by Dr. Robb.       Diagnoses of Actinic keratosis, Essential hypertension, Gastroesophageal reflux disease without esophagitis, Nonrheumatic aortic valve stenosis, Coronary artery disease involving native coronary artery of native heart without angina pectoris, Rheumatoid arthritis involving multiple sites with positive rheumatoid factor (CMS-[facility]), Chronic kidney disease (CKD), stage III (moderate), Cardiac  pacemaker in situ, Routine general medical examination at a health care facility, and Age-related osteoporosis without current pathological fracture were pertinent to this visit.    Allergies: Methotrexate; Remicade; Sulfites; and Other environmental  Current medicines (including changes today)  Current Outpatient Prescriptions   Medication Sig Dispense Refill   • CycloSPORINE (RESTASIS OP) 1 Drop by Ophthalmic route every morning. In each eye     • Lifitegrast (XIIDRA) 5 % Solution by Ophthalmic route.     • lisinopril (PRINIVIL) 10 MG Tab Take 1 tablet by mouth  every day 90 Tab 3   • potassium chloride ER (KLOR-CON) 10 MEQ tablet Take 1 tablet by mouth  every day 90 Tab 3   • omeprazole (PRILOSEC) 20 MG delayed-release capsule Take 1 capsule by mouth  every morning on an empty  stomach 90 Cap 3   • estropipate (OGEN) 0.75 MG Tab Take 1 tablet by mouth  every day 90 Tab 3   • acetaminophen (TYLENOL) 500 MG Tab Take 2 Tabs by mouth every 6 hours as needed. 21 Tab 0   • doxycycline (VIBRAMYCIN) 100 MG Tab Take 1 Tab by mouth 2 times a day. 6 Tab 0   • furosemide (LASIX) 20 MG Tab TAKE 1 TABLET BY MOUTH DAILY 90 Tab 3   • carvedilol (COREG) 12.5 MG Tab Take 1 Tab by mouth 2 times a day, with meals. 180 Tab 3   • alendronate (FOSAMAX) 70 MG Tab Take 70 mg by mouth every 7 days. On Mon     • tocilizumab (ACTEMRA) 400 MG/20ML Solution 400 mg by Intravenous route Q30 DAYS.     • Polyvinyl Alcohol-Povidone (REFRESH OP) 1 Drop by Ophthalmic route as needed (For Dry eyes).     • nitroglycerin (NITROSTAT) 0.4 MG SUBL Place 1 Tab under tongue as needed for Chest Pain. 25 Tab 11   • Calcium Carbonate-Vitamin D (CALTRATE 600+D PO) Take 1 Tab by mouth 2 Times a Day.     • Multiple Vitamins-Minerals (CENTRUM SILVER PO) Take 1 Tab by mouth every day.       No current facility-administered medications for this visit.       She  has a past medical history of Hypertension; Palpitations; Abnormal liver function test; Pacemaker;  "Indigestion; Myocardial infarct (CMS-HCC) ( ); Heart valve disease; SVT (supraventricular tachycardia); Pneumonia (1964); Bronchitis ( ); Breath shortness; Pain ( ); Heart burn; Snoring; Rheumatoid arthritis (CMS-HCC); Arthritis; Dental disorder; Unspecified cataract; and Anesthesia. She also has no past medical history of Cold.    ROS    Patient denies significant change in strength, weight or appetite.  No significant lightheadedness or headaches.  No change in vision, hearing, or swallowing.  No new dyspnea, coughing, chest pain, or palpitations.  No indigestion, abdominal pain, or change in bowel habits.  No change in urinating.  No new ankle swelling.       Objective:     PE:  /78 mmHg  Pulse 82  Temp(Src) 36.9 °C (98.4 °F)  Resp 16  Ht 1.575 m (5' 2.01\")  Wt 51.256 kg (113 lb)  BMI 20.66 kg/m2  SpO2 96%   Neck is supple without significant lymphadenopathy or masses.  Lungs are clear with normal breath sounds without wheezes or rales .  Cardiovascular: peripheral circulation is satisfactory, heart sounds are unchanged and unremarkable.  Abdomen is soft, without masses or tenderness, with normal bowel sounds.  Extremities are without significant edema, cyanosis or deformity.      Assessment and Plan:   The following treatment plan was discussed  1. Actinic keratosis  REFERRAL TO DERMATOLOGY    Long discussion regarding sun damage and cancers. Referred to dermatology.   2. Essential hypertension  BASIC METABOLIC PANEL    Continue same regimen and follow low-salt diet.   3. Gastroesophageal reflux disease without esophagitis      Briefly reviewed appropriate conservative therapy including switching Pepcid for Prilosec.   4. Nonrheumatic aortic valve stenosis      Follow-up per cardiology.   5. Coronary artery disease involving native coronary artery of native heart without angina pectoris      She will report any significant chest pain.   6. Rheumatoid arthritis involving multiple sites with positive " rheumatoid factor (CMS-HCC)      Follow-up with Dr. Robb.   7. Chronic kidney disease (CKD), stage III (moderate)      She will try to remain well hydrated and we'll avoid nephrotoxic medications.   8. Cardiac pacemaker in situ      Continue regular pacemaker checks.   9. Routine general medical examination at a health care facility     10. Age-related osteoporosis without current pathological fracture      Stay active, and continue vitamin D and Fosamax.       Followup: Return in about 4 months (around 11/14/2017), or wellness, for Long.

## 2017-07-22 NOTE — ASSESSMENT & PLAN NOTE
She had the pacemaker placed in December. Since then she said she has more energy and generally feels better.

## 2017-07-22 NOTE — ASSESSMENT & PLAN NOTE
She returns today to have some lesions on her forearms checked. She does have a fair amount of sun damage and several actinic keratoses. She also has a white spot behind her right ear that she attributes to sleeping on her hair roller. She wants to see a dermatologist.

## 2017-07-22 NOTE — ASSESSMENT & PLAN NOTE
She was getting ready for a TAVR procedure but her cardiac function improved enough that she does not seem to need it at this time.

## 2017-09-01 NOTE — PROGRESS NOTES
"Reevaluation of 6MWT/MLWHF Questionnaire    Date: 2016  6MWT: 331 meters  MLWHF: 35    Date: 2017  6MWT: 359 meters  MLWHF: 0    OP Heart Failure  Vitals  Appointment Type: Heart Failure Established  Weight: 53.1 kg (117 lb)  How Weight Obtained: Stand Up Scale  Height: 157.5 cm (5' 2\")  BMI (Calculated): 21.4  Blood Pressure : 138/92  Pulse: 84    System Assessment  NYHA Functional Class Assessment:  (HFrEF)     Smoking Hx  Have you Ever Smoked: Never     Alcohol Hx  Do you Drink?: No     Medications  Is Patient on an Evidence Based Beta Blocker: Yes  Is Patient on ACE-1 or ARB: Yes    MN Living with Heart Failure  Swelling in Ankles or Legs: 0  Having to Sit or Lie Down During the Day: 0  Walking About or Climbing Stairs Difficult: 0  Working Around the House or Yard Difficult: 0  Difficulty Going Away from Home: 0  Difficulty Sleeping Well at Night: 0  Difficulty Socializing with Family or Friends: 0  Difficulty Working to Earn a Livin  Difficulty with Recreational Pastimes, Sports, Hobbies: 0  Difficulty with Sexual Activities: 0  Eating Less Foods You Like: 0  Making you Short of Breath: 0  Tired, Fatigued or Low on Energy: 0  Making you Stay in a Hospital: 0  Costing you Money for Medical Care?: 0  Giving you Side Effects from Treatments: 0  Feeling like a [place] to Family and Friends: 0  Loss of Self Control in your Life: 0  Making You Worry: 0  Difficulty to Concentrate or Remembering Things: 0  Making you Feel Depressed: 0  MLHF Total Score : 0    6 Minute Walk Test  Baseline to end of test: 6:00  Total meters walked: 359       BASHIR Cox RN  [phone]  "

## 2017-10-31 DIAGNOSIS — I50.22 CHRONIC SYSTOLIC CONGESTIVE HEART FAILURE (HCC): ICD-10-CM

## 2017-11-01 RX ORDER — CARVEDILOL 12.5 MG/1
TABLET ORAL
Qty: 180 TAB | Refills: 3 | Status: SHIPPED | OUTPATIENT
Start: 2017-11-01 | End: 2018-10-30 | Stop reason: SDUPTHER

## 2017-11-10 ENCOUNTER — PATIENT OUTREACH (OUTPATIENT)
Dept: HEALTH INFORMATION MANAGEMENT | Facility: OTHER | Age: 82
End: 2017-11-10

## 2017-11-10 NOTE — PROGRESS NOTES
WebIZ Checked & Epic Updated: Yes  · WebIZ Recommendations: PREVNAR (PCV13) , TDAP and ZOSTAVAX (Shingles)  · Is patient due for Tdap? YES. Patient was notified of copay/out of pocket cost.  · Is patient due for Shingles? YES. Patient was notified of copay/out of pocket cost.  HealthConnect Verified: no  Verify PCP: yes    Communication Preference Obtained: yes     Review Care Team: yes    Annual Wellness Visit Scheduling  1. Scheduling Status:Scheduled      Care Gap Scheduling (Attempt to Schedule EACH Overdue Care Gap!)     Health Maintenance Due   Topic Date Due   • IMM DTaP/Tdap/Td Vaccine (1 - Tdap) Pt wants to discuss Immunizations with PCP first.   • IMM ZOSTER VACCINE  11/13/1994   • IMM PNEUMOCOCCAL 65+ (ADULT) LOW/MEDIUM RISK SERIES (1 of 2 - PCV13) 11/13/1999   • MAMMOGRAM  Will schedule appt at later time.   • Annual Wellness Visit  12/17/2016   •          Scheduled patient for Annual Wellness Visit       MyChart Activation: declined

## 2017-11-27 ENCOUNTER — TELEPHONE (OUTPATIENT)
Dept: MEDICAL GROUP | Facility: MEDICAL CENTER | Age: 82
End: 2017-11-27

## 2017-11-27 NOTE — TELEPHONE ENCOUNTER
Future Appointments       Provider Department Center    11/29/2017 1:40 PM Nick Mercer M.D.; [facility]  [facility] [address]    1/16/2018 2:15 PM PACER CHECK-[facility]     1/16/2018 3:15 PM Terry Tatum M.D. [facility]         ANNUAL WELLNESS VISIT PRE-VISIT PLANNING     1.  Reviewed note from last office visit with PCP: YES Visit date: 07/21/2017    2.  If any orders were placed at last visit, do we have Results/Consult Notes?        •  Labs - Labs ordered, NOT completed. Patient advised to complete prior to next appointment.Print BMP order        •  Imaging - Imaging was not ordered at last office visit.       •  Referrals - Referral ordered, patient was seen and consult notes are in chart. Care Teams updated  NO.     3.  Immunizations were updated in [facility] using WebIZ?: Yes       •  WebIZ Recommendations: PREVNAR (PCV13) , TDAP, ZOSTAVAX (Shingles) and MMR        •  Is patient due for Tdap? YES. Patient was not notified of copay/out of pocket cost.       •  Is patient due for Shingles? YES. Patient was not notified of copay/out of pocket cost.     4.  Specialty Comments was updated with diagnosis information provided by [facility]: [facility]     5.  Patient has the following Care Path diagnoses on Problem List:  NONE      6.  Patient is due for the following Health Maintenance Topics:   Health Maintenance Due   Topic Date Due   • IMM DTaP/Tdap/Td Vaccine (1 - Tdap) 11/13/1953   • IMM ZOSTER VACCINE  11/13/1994   • IMM PNEUMOCOCCAL 65+ (ADULT) LOW/MEDIUM RISK SERIES (1 of 2 - PCV13) 11/13/1999   • Annual Wellness Visit  12/17/2016

## 2017-11-29 ENCOUNTER — OFFICE VISIT (OUTPATIENT)
Dept: MEDICAL GROUP | Facility: MEDICAL CENTER | Age: 82
End: 2017-11-29
Payer: MEDICARE

## 2017-11-29 VITALS
DIASTOLIC BLOOD PRESSURE: 72 MMHG | HEIGHT: 62 IN | WEIGHT: 113 LBS | TEMPERATURE: 98.4 F | SYSTOLIC BLOOD PRESSURE: 138 MMHG | BODY MASS INDEX: 20.8 KG/M2 | OXYGEN SATURATION: 97 % | HEART RATE: 89 BPM | RESPIRATION RATE: 16 BRPM

## 2017-11-29 DIAGNOSIS — I25.10 CORONARY ARTERY DISEASE INVOLVING NATIVE CORONARY ARTERY OF NATIVE HEART WITHOUT ANGINA PECTORIS: Chronic | ICD-10-CM

## 2017-11-29 DIAGNOSIS — I35.0 NONRHEUMATIC AORTIC VALVE STENOSIS: ICD-10-CM

## 2017-11-29 DIAGNOSIS — I50.22 CHRONIC SYSTOLIC CONGESTIVE HEART FAILURE (HCC): Chronic | ICD-10-CM

## 2017-11-29 DIAGNOSIS — N18.30 CHRONIC KIDNEY DISEASE (CKD), STAGE III (MODERATE) (HCC): ICD-10-CM

## 2017-11-29 DIAGNOSIS — I10 ESSENTIAL HYPERTENSION: Chronic | ICD-10-CM

## 2017-11-29 DIAGNOSIS — M81.0 AGE-RELATED OSTEOPOROSIS WITHOUT CURRENT PATHOLOGICAL FRACTURE: ICD-10-CM

## 2017-11-29 DIAGNOSIS — Z00.00 ROUTINE GENERAL MEDICAL EXAMINATION AT A HEALTH CARE FACILITY: ICD-10-CM

## 2017-11-29 DIAGNOSIS — K21.9 GASTROESOPHAGEAL REFLUX DISEASE WITHOUT ESOPHAGITIS: Chronic | ICD-10-CM

## 2017-11-29 DIAGNOSIS — M05.79 RHEUMATOID ARTHRITIS INVOLVING MULTIPLE SITES WITH POSITIVE RHEUMATOID FACTOR (HCC): ICD-10-CM

## 2017-11-29 DIAGNOSIS — L57.0 ACTINIC KERATOSIS: ICD-10-CM

## 2017-11-29 PROCEDURE — G0439 PPPS, SUBSEQ VISIT: HCPCS | Performed by: INTERNAL MEDICINE

## 2017-11-29 RX ORDER — DUREZOL 0.5 MG/ML
EMULSION OPHTHALMIC
Refills: 1 | COMMUNITY
Start: 2017-10-16 | End: 2020-11-10

## 2017-11-29 ASSESSMENT — PATIENT HEALTH QUESTIONNAIRE - PHQ9: CLINICAL INTERPRETATION OF PHQ2 SCORE: 0

## 2017-11-29 NOTE — PROGRESS NOTES
Chief Complaint   Patient presents with   • Annual Wellness Visit         HPI:  Jess is a 83 y.o. here for Medicare Annual Wellness Visit    Patient Active Problem List    Diagnosis Date Noted   • Chronic systolic congestive heart failure (CMS-HCC) 06/01/2016     Priority: High   • Dilated cardiomyopathy (CMS-HCC) 06/01/2016     Priority: High   • Aortic stenosis 04/14/2016     Priority: High   • Mitral regurgitation 04/14/2016     Priority: Medium   • SVT (supraventricular tachycardia) (CMS-HCC) 08/26/2014     Priority: Medium   • Essential hypertension 12/09/2015     Priority: Low   • Actinic keratosis 07/21/2017   • Cardiac pacemaker in situ 12/13/2016   • Bradycardia 11/29/2016   • History of MI (myocardial infarction) 12/17/2015   • Routine general medical examination at a health care facility 12/09/2015   • Low serum vitamin D 12/09/2015   • Gastroesophageal reflux disease without esophagitis 06/03/2015   • Osteoporosis 06/03/2015   • Other specified cardiac dysrhythmias 11/04/2014   • Chest pain 10/30/2014   • CAD (coronary artery disease) 10/23/2014   • Hypokalemia 08/26/2014   • Anemia 05/07/2014   • RA (rheumatoid arthritis) (CMS-HCC) 05/07/2014   • Leukocytosis 05/07/2014   • Chronic kidney disease (CKD), stage III (moderate) 05/07/2014   • Hyponatremia 04/30/2013       Current Outpatient Prescriptions   Medication Sig Dispense Refill   • DUREZOL 0.05 % Emulsion INSTILL 1 GTT INTO OD QID  1   • carvedilol (COREG) 12.5 MG Tab TAKE 1 TABLET BY MOUTH TWO  TIMES DAILY WITH MEALS 180 Tab 3   • CycloSPORINE (RESTASIS OP) 1 Drop by Ophthalmic route every morning. In each eye     • lisinopril (PRINIVIL) 10 MG Tab Take 1 tablet by mouth  every day 90 Tab 3   • potassium chloride ER (KLOR-CON) 10 MEQ tablet Take 1 tablet by mouth  every day 90 Tab 3   • omeprazole (PRILOSEC) 20 MG delayed-release capsule Take 1 capsule by mouth  every morning on an empty  stomach 90 Cap 3   • estropipate (OGEN) 0.75 MG Tab Take  1 tablet by mouth  every day 90 Tab 3   • acetaminophen (TYLENOL) 500 MG Tab Take 2 Tabs by mouth every 6 hours as needed. (Patient taking differently: Take 1,000 mg by mouth as needed.) 21 Tab 0   • furosemide (LASIX) 20 MG Tab TAKE 1 TABLET BY MOUTH DAILY 90 Tab 3   • alendronate (FOSAMAX) 70 MG Tab Take 70 mg by mouth every 7 days. On Mon     • tocilizumab (ACTEMRA) 400 MG/20ML Solution 400 mg by Intravenous route Q30 DAYS.     • Polyvinyl Alcohol-Povidone (REFRESH OP) 1 Drop by Ophthalmic route as needed (For Dry eyes).     • nitroglycerin (NITROSTAT) 0.4 MG SUBL Place 1 Tab under tongue as needed for Chest Pain. 25 Tab 11   • Calcium Carbonate-Vitamin D (CALTRATE 600+D PO) Take 1 Tab by mouth 2 Times a Day.     • Multiple Vitamins-Minerals (CENTRUM SILVER PO) Take 1 Tab by mouth every day.     • Lifitegrast (XIIDRA) 5 % Solution by Ophthalmic route.     • doxycycline (VIBRAMYCIN) 100 MG Tab Take 1 Tab by mouth 2 times a day. 6 Tab 0     No current facility-administered medications for this visit.         Patient is taking medications as noted in medication list.  Current supplements as per medication list.     Allergies: Methotrexate; Remicade [infliximab injection]; Sulfites; and Other environmental    Current social contact/activities: Visit with family and friends      Is patient current with immunizations? No, due for TDAP and ZOSTAVAX (Shingles). Patient is interested in receiving NONE today.    She  reports that she has never smoked. She has never used smokeless tobacco. She reports that she drinks alcohol. She reports that she does not use drugs.  Counseling given: No        DPA/Advanced directive: Patient has Advanced Directive, but it is not on file. Instructed to bring in a copy to scan into their chart.    ROS:    Gait: Uses a cane   Ostomy: no   Other tubes: no   Amputations: no   Chronic oxygen use no   Last eye exam 11/12/17   Wears hearing aids: no   : Reports urinary leakage during the last 6  months that has somewhat interfered with their daily activities or sleep.      Depression Screening    Little interest or pleasure in doing things?  0 - not at all  Feeling down, depressed, or hopeless? 0 - not at all  Patient Health Questionnaire Score: 0    If depressive symptoms identified deferred to follow up visit unless specifically addressed in assessment and plan.    Interpretation of PHQ-9 Total Score   Score Severity   1-4 No Depression   5-9 Mild Depression   10-14 Moderate Depression   15-19 Moderately Severe Depression   20-27 Severe Depression    Screening for Cognitive Impairment    Three Minute Recall (apple, watch, maryan)  2/3    Draw clock face with all 12 numbers set to the hand to show 10 minutes past 11 o'clock  1 4/5  If cognitive concerns identified, deferred for follow up unless specifically addressed in assessment and plan.    Fall Risk Assessment    Has the patient had two or more falls in the last year or any fall with injury in the last year?  No  If fall risk identified, deferred for follow up unless specifically addressed in assessment and plan.    Safety Assessment    Throw rugs on floor.  No  Handrails on all stairs.  Yes  Good lighting in all hallways.  Yes  Difficulty hearing.  No  Patient counseled about all safety risks that were identified.    Functional Assessment ADLs    Are there any barriers preventing you from cooking for yourself or meeting nutritional needs?  No.    Are there any barriers preventing you from driving safely or obtaining transportation?  No.    Are there any barriers preventing you from using a telephone or calling for help?  No.    Are there any barriers preventing you from shopping?  No.    Are there any barriers preventing you from taking care of your own finances?  No.    Are there any barriers preventing you from managing your medications?  No.    Are you currently engaging any exercise or physical activity?  Yes.       Health Maintenance Summary                 IMM DTaP/Tdap/Td Vaccine Overdue 11/13/1953     IMM ZOSTER VACCINE Overdue 11/13/1994     IMM PNEUMOCOCCAL 65+ (ADULT) LOW/MEDIUM RISK SERIES Overdue 11/13/1999     Annual Wellness Visit Overdue 12/17/2016      Done 12/17/2015      Patient has more history with this topic...    BONE DENSITY Next Due 12/17/2020      Postponed 12/17/2015 Patient will discuss scheduling test with her PCP.    COLONOSCOPY Next Due 12/17/2025      Postponed 12/17/2015 Patient states she had colonoscopy while in [place] in 2014.     Patient has more history with this topic...          Patient Care Team:  Nick Mercer M.D. as PCP - General (Internal Medicine)  Justin Ang M.D. as Consulting Physician (Cardiology)  Terry Robb M.D. as Consulting Physician (Rheumatology)    Social History   Substance Use Topics   • Smoking status: Never Smoker   • Smokeless tobacco: Never Used   • Alcohol use Yes      Comment: Occasionally     Family History   Problem Relation Age of Onset   • Heart Disease Brother      She  has a past medical history of Abnormal liver function test; Anesthesia; Arthritis; Breath shortness; Bronchitis ( ); Dental disorder; Heart burn; Heart valve disease; Hypertension; Indigestion; Myocardial infarct ( ); Pacemaker; Pain ( ); Palpitations; Pneumonia (1964); Rheumatoid arthritis(714.0); Snoring; SVT (supraventricular tachycardia) (CMS-HCC); and Unspecified cataract. She also has no past medical history of Cold.   Past Surgical History:   Procedure Laterality Date   • PACEMAKER INSERTION  December 2016    Medtronic Adapta [id number] implanted by Dr. Ang.   • RECOVERY  5/19/2016    Procedure: CATH LAB [facility] WITH A SHOT OF THE AORTIC ROOT DR. NAYAK;  Surgeon: Recoveryonly Surgery;  [place] [facility]  Service:    • RECOVERY  4/27/2016    Procedure: CATH LAB MELVIN WITH ANESTHESIA ;  Surgeon: Recoveryonly Surgery;  [place] [facility]  Service:    •  "RECOVERY  11/4/2014    Performed by Cath-Recovery Surgery at SURGERY SAME DAY [facility] ORS   • GASTROSCOPY WITH BIOPSY  5/7/2014    Performed by Darin Barros M.D. at SURGERY [facility] ORS   • COLONOSCOPY WITH CLIPPING  5/7/2014    Performed by Darin Barros M.D. at SURGERY [facility] ORS   • BREAST BIOPSY     • HYSTERECTOMY, VAGINAL     • RECTOCELE REPAIR     • TONSILLECTOMY             Exam:     Blood pressure 138/72, pulse 89, temperature 36.9 °C (98.4 °F), resp. rate 16, height 1.575 m (5' 2\"), weight 51.3 kg (113 lb), SpO2 97 %. Body mass index is 20.67 kg/m².    Hearing good.    Dentition upper and lower dentures  Alert, oriented in no acute distress.  Eye contact is good, speech goal directed, affect calm.  Neck is supple and without lymphadenopathy. Lungs are clear without rales or wheeze cardiovascular peripheral circulation is satisfactory. Heart sounds are unremarkable with a previously noted grade 3/6 systolic blowing murmur loudest at the upper right sternal border. Abdomen is soft without masses or tenderness. Breast, pelvic, and rectal exams were not performed. Extremities are without significant edema, cyanosis, or deformity. Neurologic exam is unremarkable with essentially normal sensation, strength, gait, and cerebellar functions.      Assessment and Plan. The following treatment and monitoring plan is recommended:      RA (rheumatoid arthritis)  Rheumatoid arthritis is followed by Dr. Robb.  She receives Actemra infusions monthly.    Osteoporosis  She has a history of osteoporosis for which she is taking Fosamax currently. She also is taking some calcium with vitamin D. She tries to remain as active as possible.    Gastroesophageal reflux disease without esophagitis  She has gastroesophageal reflux for which she takes Prilosec. Symptoms are minimal she reports.    Essential hypertension  Blood pressure when she came in was a little high but repeat check was in a better range. We " discussed especially low-salt diet. She will continue lisinopril. She denies lightheadedness.    Chronic systolic congestive heart failure  Her systolic congestive heart failure is under good control currently with ejection fraction of 60%. She is followed for this by Dr. Ang.    Chronic kidney disease (CKD), stage III (moderate)  She has chronic renal insufficiency and has not had recent lab tests. We will order a BMP. She was encouraged to remain well hydrated.    CAD (coronary artery disease)  She has a history of coronary artery disease. She denies recent chest pain. She remains fairly active.    Aortic stenosis  She has a history of aortic stenosis that currently is fairly mild. As noted, ejection fraction is 60%. She remains fairly active doing a lot of her yard work. She is followed for this by Dr. Ang.    Actinic keratosis  She has not yet seen her dermatologist but we'll get that done in the fairly near future. She has not noticed any significant recent skin changes.          Services suggested: No services needed at this time  Health Care Screening recommendations as per orders if indicated.  Referrals offered: PT/OT/Nutrition counseling/Behavioral Health/Smoking cessation as per orders if indicated.    Discussion today about general wellness and lifestyle habits:    · Prevent falls and reduce trip hazards; Cautioned about securing or removing rugs.  · Have a working fire alarm and carbon monoxide detector;   · Engage in regular physical activity and social activities       Follow-up: Return in about 6 months (around 5/29/2018) for Short.

## 2017-11-30 NOTE — ASSESSMENT & PLAN NOTE
She has a history of aortic stenosis that currently is fairly mild. As noted, ejection fraction is 60%. She remains fairly active doing a lot of her yard work. She is followed for this by Dr. Ang.

## 2017-11-30 NOTE — ASSESSMENT & PLAN NOTE
She has not yet seen her dermatologist but we'll get that done in the fairly near future. She has not noticed any significant recent skin changes.

## 2017-11-30 NOTE — ASSESSMENT & PLAN NOTE
She has a history of osteoporosis for which she is taking Fosamax currently. She also is taking some calcium with vitamin D. She tries to remain as active as possible.

## 2017-11-30 NOTE — ASSESSMENT & PLAN NOTE
Blood pressure when she came in was a little high but repeat check was in a better range. We discussed especially low-salt diet. She will continue lisinopril. She denies lightheadedness.

## 2017-11-30 NOTE — ASSESSMENT & PLAN NOTE
She has chronic renal insufficiency and has not had recent lab tests. We will order a BMP. She was encouraged to remain well hydrated.

## 2017-11-30 NOTE — ASSESSMENT & PLAN NOTE
She has a history of coronary artery disease. She denies recent chest pain. She remains fairly active.

## 2017-11-30 NOTE — ASSESSMENT & PLAN NOTE
Her systolic congestive heart failure is under good control currently with ejection fraction of 60%. She is followed for this by Dr. Ang.

## 2017-12-04 ENCOUNTER — APPOINTMENT (RX ONLY)
Dept: URBAN - METROPOLITAN AREA CLINIC 20 | Facility: CLINIC | Age: 82
Setting detail: DERMATOLOGY
End: 2017-12-04

## 2017-12-04 DIAGNOSIS — D22 MELANOCYTIC NEVI: ICD-10-CM

## 2017-12-04 DIAGNOSIS — D18.0 HEMANGIOMA: ICD-10-CM

## 2017-12-04 DIAGNOSIS — L57.0 ACTINIC KERATOSIS: ICD-10-CM

## 2017-12-04 DIAGNOSIS — L82.1 OTHER SEBORRHEIC KERATOSIS: ICD-10-CM

## 2017-12-04 DIAGNOSIS — B37.2 CANDIDIASIS OF SKIN AND NAIL: ICD-10-CM

## 2017-12-04 DIAGNOSIS — L81.4 OTHER MELANIN HYPERPIGMENTATION: ICD-10-CM

## 2017-12-04 PROBLEM — D22.61 MELANOCYTIC NEVI OF RIGHT UPPER LIMB, INCLUDING SHOULDER: Status: ACTIVE | Noted: 2017-12-04

## 2017-12-04 PROBLEM — D22.72 MELANOCYTIC NEVI OF LEFT LOWER LIMB, INCLUDING HIP: Status: ACTIVE | Noted: 2017-12-04

## 2017-12-04 PROBLEM — M12.9 ARTHROPATHY, UNSPECIFIED: Status: ACTIVE | Noted: 2017-12-04

## 2017-12-04 PROBLEM — I10 ESSENTIAL (PRIMARY) HYPERTENSION: Status: ACTIVE | Noted: 2017-12-04

## 2017-12-04 PROBLEM — D22.71 MELANOCYTIC NEVI OF RIGHT LOWER LIMB, INCLUDING HIP: Status: ACTIVE | Noted: 2017-12-04

## 2017-12-04 PROBLEM — D48.5 NEOPLASM OF UNCERTAIN BEHAVIOR OF SKIN: Status: ACTIVE | Noted: 2017-12-04

## 2017-12-04 PROBLEM — D22.39 MELANOCYTIC NEVI OF OTHER PARTS OF FACE: Status: ACTIVE | Noted: 2017-12-04

## 2017-12-04 PROBLEM — D18.01 HEMANGIOMA OF SKIN AND SUBCUTANEOUS TISSUE: Status: ACTIVE | Noted: 2017-12-04

## 2017-12-04 PROBLEM — D22.62 MELANOCYTIC NEVI OF LEFT UPPER LIMB, INCLUDING SHOULDER: Status: ACTIVE | Noted: 2017-12-04

## 2017-12-04 PROBLEM — D22.5 MELANOCYTIC NEVI OF TRUNK: Status: ACTIVE | Noted: 2017-12-04

## 2017-12-04 PROCEDURE — ? BIOPSY BY SHAVE METHOD

## 2017-12-04 PROCEDURE — 99203 OFFICE O/P NEW LOW 30 MIN: CPT | Mod: 25

## 2017-12-04 PROCEDURE — ? SUNSCREEN RECOMMENDATIONS

## 2017-12-04 PROCEDURE — ? COUNSELING

## 2017-12-04 PROCEDURE — ? PRESCRIPTION

## 2017-12-04 PROCEDURE — ? LIQUID NITROGEN

## 2017-12-04 PROCEDURE — 17000 DESTRUCT PREMALG LESION: CPT

## 2017-12-04 PROCEDURE — 17003 DESTRUCT PREMALG LES 2-14: CPT

## 2017-12-04 PROCEDURE — 11100: CPT | Mod: 59

## 2017-12-04 RX ORDER — NYSTATIN AND TRIAMCINOLONE ACETONIDE 100000; 1 MG/G; MG/G
CREAM TOPICAL
Qty: 1 | Refills: 0 | Status: ERX | COMMUNITY
Start: 2017-12-04

## 2017-12-04 RX ADMIN — NYSTATIN AND TRIAMCINOLONE ACETONIDE: 100000; 1 CREAM TOPICAL at 23:02

## 2017-12-04 ASSESSMENT — LOCATION DETAILED DESCRIPTION DERM
LOCATION DETAILED: LEFT DISTAL DORSAL FOREARM
LOCATION DETAILED: RIGHT DISTAL LATERAL POSTERIOR THIGH
LOCATION DETAILED: RIGHT INFERIOR MEDIAL UPPER BACK
LOCATION DETAILED: RIGHT DISTAL POSTERIOR UPPER ARM
LOCATION DETAILED: LEFT DISTAL POSTERIOR THIGH
LOCATION DETAILED: LEFT PROXIMAL POSTERIOR UPPER ARM
LOCATION DETAILED: RIGHT INFERIOR FOREHEAD
LOCATION DETAILED: RIGHT SUPERIOR UPPER BACK
LOCATION DETAILED: RIGHT PROXIMAL POSTERIOR UPPER ARM
LOCATION DETAILED: RIGHT DISTAL POSTERIOR THIGH
LOCATION DETAILED: INFERIOR THORACIC SPINE
LOCATION DETAILED: GLUTEAL CLEFT
LOCATION DETAILED: RIGHT INFERIOR CENTRAL MALAR CHEEK
LOCATION DETAILED: RIGHT INFERIOR MEDIAL MIDBACK
LOCATION DETAILED: RIGHT PROXIMAL PRETIBIAL REGION
LOCATION DETAILED: RIGHT VENTRAL PROXIMAL FOREARM
LOCATION DETAILED: LEFT VENTRAL PROXIMAL FOREARM
LOCATION DETAILED: LEFT LATERAL PROXIMAL PRETIBIAL REGION

## 2017-12-04 ASSESSMENT — LOCATION SIMPLE DESCRIPTION DERM
LOCATION SIMPLE: RIGHT LOWER BACK
LOCATION SIMPLE: RIGHT PRETIBIAL REGION
LOCATION SIMPLE: LEFT PRETIBIAL REGION
LOCATION SIMPLE: RIGHT UPPER BACK
LOCATION SIMPLE: RIGHT FOREARM
LOCATION SIMPLE: RIGHT CHEEK
LOCATION SIMPLE: RIGHT POSTERIOR THIGH
LOCATION SIMPLE: LEFT POSTERIOR THIGH
LOCATION SIMPLE: UPPER BACK
LOCATION SIMPLE: RIGHT FOREHEAD
LOCATION SIMPLE: RIGHT POSTERIOR UPPER ARM
LOCATION SIMPLE: GLUTEAL CLEFT
LOCATION SIMPLE: LEFT POSTERIOR UPPER ARM
LOCATION SIMPLE: LEFT FOREARM

## 2017-12-04 ASSESSMENT — LOCATION ZONE DERM
LOCATION ZONE: LEG
LOCATION ZONE: TRUNK
LOCATION ZONE: FACE
LOCATION ZONE: ARM

## 2017-12-04 NOTE — PROCEDURE: BIOPSY BY SHAVE METHOD
Dressing: Band-Aid
Detail Level: Detailed
Size Of Lesion In Cm: 0.7
Render Post-Care Instructions In Note?: yes
Anesthesia Volume In Cc: 1
Wound Care: Bacitracin
Post-Care Instructions: I reviewed with the patient in detail post-care instructions. Patient is to keep the biopsy site clean once daily and then apply petroleum and bandaid  until healed.
X Size Of Lesion In Cm: 0.5
Lab Facility: 
Bill [id number] For Specimen Handling/Conveyance To Laboratory?: no
Notification Instructions: Patient will be notified of biopsy results. However, patient instructed to call the office if not contacted within 2 weeks.
Lab: [id number]
Biopsy Method: Personna blade
Type Of Destruction Used: Curettage
Additional Anesthesia Volume In Cc (Will Not Render If 0): 0
Consent: Written consent was obtained and risks were reviewed including but not limited to scarring, infection, bleeding, scabbing, incomplete removal, nerve damage and allergy to anesthesia.
Billing Type: Third-Party Bill
Anesthesia Type: 1% lidocaine with 1:100,000 epinephrine and 408mcg clindamycin/ml and a 1:10 solution of 8.4% sodium bicarbonate
Hemostasis: Drysol and Electrocautery
Biopsy Type: H and E

## 2017-12-04 NOTE — PROCEDURE: LIQUID NITROGEN
Consent: The patient's consent was obtained including but not limited to risks of crusting, scabbing, blistering, scarring, darker or lighter pigmentary change, recurrence, incomplete removal and infection. RTC in 2 months if lesion(s) persistent.
Number Of Freeze-Thaw Cycles: 2 freeze-thaw cycles
Detail Level: Detailed
Post-Care Instructions: I reviewed with the patient in detail post-care instructions. Patient is to wear sunprotection, and avoid picking at any of the treated lesions. Pt may apply Vaseline to crusted or scabbing areas.
Render Post-Care Instructions In Note?: yes
Duration Of Freeze Thaw-Cycle (Seconds): 10

## 2017-12-09 ENCOUNTER — RX ONLY (OUTPATIENT)
Age: 82
Setting detail: RX ONLY
End: 2017-12-09

## 2017-12-09 RX ORDER — KETOCONAZOLE 20 MG/G
CREAM TOPICAL
Qty: 1 | Refills: 1 | Status: ERX | COMMUNITY
Start: 2017-12-09

## 2018-01-16 ENCOUNTER — NON-PROVIDER VISIT (OUTPATIENT)
Dept: CARDIOLOGY | Facility: MEDICAL CENTER | Age: 83
End: 2018-01-16
Payer: MEDICARE

## 2018-01-16 ENCOUNTER — OFFICE VISIT (OUTPATIENT)
Dept: CARDIOLOGY | Facility: MEDICAL CENTER | Age: 83
End: 2018-01-16
Payer: MEDICARE

## 2018-01-16 VITALS
DIASTOLIC BLOOD PRESSURE: 72 MMHG | SYSTOLIC BLOOD PRESSURE: 140 MMHG | WEIGHT: 115 LBS | HEIGHT: 63 IN | HEART RATE: 70 BPM | BODY MASS INDEX: 20.38 KG/M2 | OXYGEN SATURATION: 97 %

## 2018-01-16 DIAGNOSIS — I25.10 CORONARY ARTERY DISEASE INVOLVING NATIVE CORONARY ARTERY OF NATIVE HEART WITHOUT ANGINA PECTORIS: Chronic | ICD-10-CM

## 2018-01-16 DIAGNOSIS — R60.9 EDEMA, UNSPECIFIED TYPE: ICD-10-CM

## 2018-01-16 DIAGNOSIS — I25.2 HISTORY OF MI (MYOCARDIAL INFARCTION): ICD-10-CM

## 2018-01-16 DIAGNOSIS — I42.0 DILATED CARDIOMYOPATHY (HCC): ICD-10-CM

## 2018-01-16 DIAGNOSIS — N18.30 CHRONIC KIDNEY DISEASE (CKD), STAGE III (MODERATE) (HCC): ICD-10-CM

## 2018-01-16 DIAGNOSIS — R00.1 BRADYCARDIA: ICD-10-CM

## 2018-01-16 DIAGNOSIS — I35.0 NONRHEUMATIC AORTIC VALVE STENOSIS: ICD-10-CM

## 2018-01-16 DIAGNOSIS — I10 ESSENTIAL HYPERTENSION: Chronic | ICD-10-CM

## 2018-01-16 DIAGNOSIS — Z95.0 CARDIAC PACEMAKER IN SITU: ICD-10-CM

## 2018-01-16 DIAGNOSIS — I50.22 CHRONIC SYSTOLIC CONGESTIVE HEART FAILURE (HCC): Chronic | ICD-10-CM

## 2018-01-16 PROCEDURE — 99214 OFFICE O/P EST MOD 30 MIN: CPT | Performed by: INTERNAL MEDICINE

## 2018-01-16 PROCEDURE — 93280 PM DEVICE PROGR EVAL DUAL: CPT | Performed by: INTERNAL MEDICINE

## 2018-01-16 RX ORDER — FUROSEMIDE 20 MG/1
TABLET ORAL
Qty: 90 TAB | Refills: 3 | Status: SHIPPED | OUTPATIENT
Start: 2018-01-16 | End: 2018-10-30 | Stop reason: SDUPTHER

## 2018-01-16 ASSESSMENT — ENCOUNTER SYMPTOMS
CARDIOVASCULAR NEGATIVE: 1
WEAKNESS: 0
COUGH: 0
SPUTUM PRODUCTION: 0
BRUISES/BLEEDS EASILY: 0
FEVER: 0
STRIDOR: 0
DIZZINESS: 0
PND: 0
LOSS OF CONSCIOUSNESS: 0
PALPITATIONS: 0
CONSTITUTIONAL NEGATIVE: 1
SORE THROAT: 0
ORTHOPNEA: 0
CLAUDICATION: 0
WHEEZING: 0
HEMOPTYSIS: 0
RESPIRATORY NEGATIVE: 1
GASTROINTESTINAL NEGATIVE: 1
NEUROLOGICAL NEGATIVE: 1
MUSCULOSKELETAL NEGATIVE: 1
CHILLS: 0
EYES NEGATIVE: 1
SHORTNESS OF BREATH: 0

## 2018-01-16 NOTE — LETTER
[facility]   [address]  [place] [address]  Phone: [phone]  Fax: [phone]              Jess Colón  11/13/1934    Encounter Date: 1/16/2018    Terry Tatum M.D.          PROGRESS NOTE:  Subjective:   Jess Colón is a 83 y.o. female who presents today as a follow-up for her aortic valve disease as well as her shortness of breath or extremity edema and her history of heart failure. Since she last seen she is doing well. Her aortic valve remains stable. She did not have any chest pain palpitations PND orthopnea. She's having no issues with her high blood pressure. Her lipids are controlled.    Chief Complaint: Aortic stenosis, heart failure, hypertension    Past Medical History:   Diagnosis Date   • Abnormal liver function test    • Anesthesia     PONV   • Arthritis     Rheumatoid Arthritis x 30 years   • Breath shortness     with exertion or outdoor allergies   • Bronchitis      10-15 years ago   • Dental disorder     Full dentures   • Heart burn    • Heart valve disease     mitral valve prolapse   • Hypertension    • Indigestion    • Myocardial infarct     • Pacemaker    • Pain      feet secondary to RA=2/10   • Palpitations    • Pneumonia 1964   • Rheumatoid arthritis(714.0)    • Snoring    • SVT (supraventricular tachycardia) (CMS-[facility])     SVT/A-Flutter   • Unspecified cataract     Surgery L eye     Past Surgical History:   Procedure Laterality Date   • PACEMAKER INSERTION  December 2016    Medtronic Adapta [id number] implanted by Dr. Ang.   • RECOVERY  5/19/2016    Procedure: CATH LAB [facility], [facility] WITH A SHOT OF THE AORTIC ROOT DR. NAYAK;  Surgeon: Recoveryonly Surgery;  Location: SURGERY PRE-POST PROC UNIT [facility];  Service:    • RECOVERY  4/27/2016    Procedure: CATH LAB MELVIN WITH ANESTHESIA ;  Surgeon: Arabella Surgery;  Location: SURGERY PRE-POST PROC UNIT [facility];  Service:    • RECOVERY  11/4/2014    Performed by  "Cath-Recovery Surgery at SURGERY SAME DAY [facility] ORS   • GASTROSCOPY WITH BIOPSY  5/7/2014    Performed by Darin Barros M.D. at SURGERY [facility] ORS   • COLONOSCOPY WITH CLIPPING  5/7/2014    Performed by Darin Barros M.D. at SURGERY [facility] ORS   • BREAST BIOPSY     • HYSTERECTOMY, VAGINAL     • RECTOCELE REPAIR     • TONSILLECTOMY       Family History   Problem Relation Age of Onset   • Heart Disease Brother      History   Smoking Status   • Never Smoker   Smokeless Tobacco   • Never Used     Allergies   Allergen Reactions   • Methotrexate      Pt. States it effects her liver.   • Remicade [Infliximab Injection]      Shaking/hot-flashes   • Sulfites Diarrhea   • Other Environmental Runny Nose and Itching     Plants; \"everything\"     Outpatient Encounter Prescriptions as of 1/16/2018   Medication Sig Dispense Refill   • NON SPECIFIED      • furosemide (LASIX) 20 MG Tab TAKE 1 TABLET BY MOUTH DAILY 90 Tab 3   • estropipate (OGEN) 0.75 MG Tab TAKE 1 TABLET BY MOUTH  EVERY DAY 90 Tab 3   • omeprazole (PRILOSEC) 20 MG delayed-release capsule TAKE 1 CAPSULE BY MOUTH  EVERY MORNING ON AN EMPTY  STOMACH 90 Cap 3   • carvedilol (COREG) 12.5 MG Tab TAKE 1 TABLET BY MOUTH TWO  TIMES DAILY WITH MEALS 180 Tab 3   • CycloSPORINE (RESTASIS OP) 1 Drop by Ophthalmic route 2 Times a Day. In each eye      • lisinopril (PRINIVIL) 10 MG Tab Take 1 tablet by mouth  every day 90 Tab 3   • potassium chloride ER (KLOR-CON) 10 MEQ tablet Take 1 tablet by mouth  every day 90 Tab 3   • alendronate (FOSAMAX) 70 MG Tab Take 70 mg by mouth every 7 days. On Mon     • tocilizumab (ACTEMRA) 400 MG/20ML Solution 400 mg by Intravenous route Q30 DAYS.     • Polyvinyl Alcohol-Povidone (REFRESH OP) 2 Drops by Ophthalmic route 2 Times a Day.     • Calcium Carbonate-Vitamin D (CALTRATE 600+D PO) Take 1 Tab by mouth 2 Times a Day.     • Multiple Vitamins-Minerals (CENTRUM SILVER PO) Take 1 Tab by mouth every day.     • DUREZOL " "0.05 % Emulsion INSTILL 1 GTT INTO OD QID  1   • Lifitegrast (XIIDRA) 5 % Solution by Ophthalmic route.     • acetaminophen (TYLENOL) 500 MG Tab Take 2 Tabs by mouth every 6 hours as needed. (Patient taking differently: Take 1,000 mg by mouth as needed.) 21 Tab 0   • doxycycline (VIBRAMYCIN) 100 MG Tab Take 1 Tab by mouth 2 times a day. 6 Tab 0   • [DISCONTINUED] furosemide (LASIX) 20 MG Tab TAKE 1 TABLET BY MOUTH DAILY 90 Tab 3   • nitroglycerin (NITROSTAT) 0.4 MG SUBL Place 1 Tab under tongue as needed for Chest Pain. 25 Tab 11     No facility-administered encounter medications on file as of 1/16/2018.      Review of Systems   Constitutional: Negative.  Negative for chills, fever and malaise/fatigue.   HENT: Negative.  Negative for sore throat.    Eyes: Negative.    Respiratory: Negative.  Negative for cough, hemoptysis, sputum production, shortness of breath, wheezing and stridor.    Cardiovascular: Negative.  Negative for chest pain, palpitations, orthopnea, claudication, leg swelling and PND.   Gastrointestinal: Negative.    Genitourinary: Negative.    Musculoskeletal: Negative.    Skin: Negative.    Neurological: Negative.  Negative for dizziness, loss of consciousness and weakness.   Endo/Heme/Allergies: Negative.  Does not bruise/bleed easily.   All other systems reviewed and are negative.       Objective:   /72   Pulse 70   Ht 1.588 m (5' 2.5\")   Wt 52.2 kg (115 lb)   SpO2 97%   BMI 20.70 kg/m²      Physical Exam   Constitutional: She is oriented to person, place, and time. She appears well-developed and well-nourished. No distress.   HENT:   Head: Normocephalic.   Mouth/Throat: Oropharynx is clear and moist.   Eyes: EOM are normal. Pupils are equal, round, and reactive to light. Right eye exhibits no discharge. Left eye exhibits no discharge. No scleral icterus.   Neck: Normal range of motion. Neck supple. No JVD present. No tracheal deviation present.   Cardiovascular: Normal rate, regular " rhythm, S1 normal, S2 normal, normal heart sounds, intact distal pulses and normal pulses.  Exam reveals no gallop, no S3, no S4 and no friction rub.    No murmur heard.   No systolic murmur is present    No diastolic murmur is present   Pulses:       Carotid pulses are 2+ on the right side, and 2+ on the left side.       Radial pulses are 2+ on the right side, and 2+ on the left side.        Dorsalis pedis pulses are 2+ on the right side, and 2+ on the left side.        Posterior tibial pulses are 2+ on the right side, and 2+ on the left side.   Pulmonary/Chest: Effort normal and breath sounds normal. No respiratory distress. She has no wheezes. She has no rales.   Abdominal: Soft. Bowel sounds are normal. She exhibits no distension and no mass. There is no tenderness. There is no rebound and no guarding.   Musculoskeletal: She exhibits no edema.   Neurological: She is alert and oriented to person, place, and time. No cranial nerve deficit.   Skin: Skin is warm and dry. She is not diaphoretic. No pallor.   Psychiatric: She has a normal mood and affect. Her behavior is normal. Judgment and thought content normal.   Nursing note and vitals reviewed.      Assessment:     1. Nonrheumatic aortic valve stenosis     2. Bradycardia     3. Coronary artery disease involving native coronary artery of native heart without angina pectoris     4. Chronic kidney disease (CKD), stage III (moderate)     5. Chronic systolic congestive heart failure (CMS-HCC)     6. Dilated cardiomyopathy (CMS-HCC)     7. Essential hypertension     8. History of MI (myocardial infarction)     9. Edema, unspecified type  furosemide (LASIX) 20 MG Tab       Medical Decision Making:  Today's Assessment / Status / Plan:     82-year-old female with aortic stenosis with low flow and lowgradients a did significantly increased with dobutamine infusion.  She continues to do well. We will make no changes to her medical therapy at this point see her back 6  months.     1. CHFrEF  - cont coreg to 12.5 BID  - cont lisin 20  - cont lasix 20 once daily     2. Aortic stenosis, moderate, low flow  - moderate  - repeat echo in one year     3. MR  - likely function from dilated LV     4. HB s/p PPM    - f/u EP    Thank for you allowing me to take part in your patient's care, please call should you have any questions or would like to discuss this patient.      Nick Mercer M.D.  [address] [place] [id number]  VIA In Basket

## 2018-01-16 NOTE — PROGRESS NOTES
Subjective:   Jess Colón is a 83 y.o. female who presents today as a follow-up for her aortic valve disease as well as her shortness of breath or extremity edema and her history of heart failure. Since she last seen she is doing well. Her aortic valve remains stable. She did not have any chest pain palpitations PND orthopnea. She's having no issues with her high blood pressure. Her lipids are controlled.    Chief Complaint: Aortic stenosis, heart failure, hypertension    Past Medical History:   Diagnosis Date   • Abnormal liver function test    • Anesthesia     PONV   • Arthritis     Rheumatoid Arthritis x 30 years   • Breath shortness     with exertion or outdoor allergies   • Bronchitis      10-15 years ago   • Dental disorder     Full dentures   • Heart burn    • Heart valve disease     mitral valve prolapse   • Hypertension    • Indigestion    • Myocardial infarct     • Pacemaker    • Pain      feet secondary to RA=2/10   • Palpitations    • Pneumonia 1964   • Rheumatoid arthritis(714.0)    • Snoring    • SVT (supraventricular tachycardia) (CMS-[facility])     SVT/A-Flutter   • Unspecified cataract     Surgery L eye     Past Surgical History:   Procedure Laterality Date   • PACEMAKER INSERTION  December 2016    Medtronic Adapta [id number] implanted by Dr. Ang.   • RECOVERY  5/19/2016    Procedure: CATH LAB C, [facility] WITH A SHOT OF THE AORTIC ROOT DR. NAYAK;  Surgeon: Recoveryonly Surgery;  Location: SURGERY PRE-POST PROC UNIT [facility];  Service:    • RECOVERY  4/27/2016    Procedure: CATH LAB MELVIN WITH ANESTHESIA ;  Surgeon: Recoveryonly Surgery;  Location: SURGERY PRE-POST PROC UNIT [facility];  Service:    • RECOVERY  11/4/2014    Performed by Cath-Recovery Surgery at SURGERY SAME DAY [facility] ORS   • GASTROSCOPY WITH BIOPSY  5/7/2014    Performed by Darin Barros M.D. at [facility] ORS   • COLONOSCOPY WITH CLIPPING  5/7/2014    Performed by Darin Barros M.D. at [facility] ORS   •  "BREAST BIOPSY     • HYSTERECTOMY, VAGINAL     • RECTOCELE REPAIR     • TONSILLECTOMY       Family History   Problem Relation Age of Onset   • Heart Disease Brother      History   Smoking Status   • Never Smoker   Smokeless Tobacco   • Never Used     Allergies   Allergen Reactions   • Methotrexate      Pt. States it effects her liver.   • Remicade [Infliximab Injection]      Shaking/hot-flashes   • Sulfites Diarrhea   • Other Environmental Runny Nose and Itching     Plants; \"everything\"     Outpatient Encounter Prescriptions as of 1/16/2018   Medication Sig Dispense Refill   • NON SPECIFIED      • furosemide (LASIX) 20 MG Tab TAKE 1 TABLET BY MOUTH DAILY 90 Tab 3   • estropipate (OGEN) 0.75 MG Tab TAKE 1 TABLET BY MOUTH  EVERY DAY 90 Tab 3   • omeprazole (PRILOSEC) 20 MG delayed-release capsule TAKE 1 CAPSULE BY MOUTH  EVERY MORNING ON AN EMPTY  STOMACH 90 Cap 3   • carvedilol (COREG) 12.5 MG Tab TAKE 1 TABLET BY MOUTH TWO  TIMES DAILY WITH MEALS 180 Tab 3   • CycloSPORINE (RESTASIS OP) 1 Drop by Ophthalmic route 2 Times a Day. In each eye      • lisinopril (PRINIVIL) 10 MG Tab Take 1 tablet by mouth  every day 90 Tab 3   • potassium chloride ER (KLOR-CON) 10 MEQ tablet Take 1 tablet by mouth  every day 90 Tab 3   • alendronate (FOSAMAX) 70 MG Tab Take 70 mg by mouth every 7 days. On Mon     • tocilizumab (ACTEMRA) 400 MG/20ML Solution 400 mg by Intravenous route Q30 DAYS.     • Polyvinyl Alcohol-Povidone (REFRESH OP) 2 Drops by Ophthalmic route 2 Times a Day.     • Calcium Carbonate-Vitamin D (CALTRATE 600+D PO) Take 1 Tab by mouth 2 Times a Day.     • Multiple Vitamins-Minerals (CENTRUM SILVER PO) Take 1 Tab by mouth every day.     • DUREZOL 0.05 % Emulsion INSTILL 1 GTT INTO OD QID  1   • Lifitegrast (XIIDRA) 5 % Solution by Ophthalmic route.     • acetaminophen (TYLENOL) 500 MG Tab Take 2 Tabs by mouth every 6 hours as needed. (Patient taking differently: Take 1,000 mg by mouth as needed.) 21 Tab 0   • " "doxycycline (VIBRAMYCIN) 100 MG Tab Take 1 Tab by mouth 2 times a day. 6 Tab 0   • [DISCONTINUED] furosemide (LASIX) 20 MG Tab TAKE 1 TABLET BY MOUTH DAILY 90 Tab 3   • nitroglycerin (NITROSTAT) 0.4 MG SUBL Place 1 Tab under tongue as needed for Chest Pain. 25 Tab 11     No facility-administered encounter medications on file as of 1/16/2018.      Review of Systems   Constitutional: Negative.  Negative for chills, fever and malaise/fatigue.   HENT: Negative.  Negative for sore throat.    Eyes: Negative.    Respiratory: Negative.  Negative for cough, hemoptysis, sputum production, shortness of breath, wheezing and stridor.    Cardiovascular: Negative.  Negative for chest pain, palpitations, orthopnea, claudication, leg swelling and PND.   Gastrointestinal: Negative.    Genitourinary: Negative.    Musculoskeletal: Negative.    Skin: Negative.    Neurological: Negative.  Negative for dizziness, loss of consciousness and weakness.   Endo/Heme/Allergies: Negative.  Does not bruise/bleed easily.   All other systems reviewed and are negative.       Objective:   /72   Pulse 70   Ht 1.588 m (5' 2.5\")   Wt 52.2 kg (115 lb)   SpO2 97%   BMI 20.70 kg/m²     Physical Exam   Constitutional: She is oriented to person, place, and time. She appears well-developed and well-nourished. No distress.   HENT:   Head: Normocephalic.   Mouth/Throat: Oropharynx is clear and moist.   Eyes: EOM are normal. Pupils are equal, round, and reactive to light. Right eye exhibits no discharge. Left eye exhibits no discharge. No scleral icterus.   Neck: Normal range of motion. Neck supple. No JVD present. No tracheal deviation present.   Cardiovascular: Normal rate, regular rhythm, S1 normal, S2 normal, normal heart sounds, intact distal pulses and normal pulses.  Exam reveals no gallop, no S3, no S4 and no friction rub.    No murmur heard.   No systolic murmur is present    No diastolic murmur is present   Pulses:       Carotid pulses are 2+ " on the right side, and 2+ on the left side.       Radial pulses are 2+ on the right side, and 2+ on the left side.        Dorsalis pedis pulses are 2+ on the right side, and 2+ on the left side.        Posterior tibial pulses are 2+ on the right side, and 2+ on the left side.   Pulmonary/Chest: Effort normal and breath sounds normal. No respiratory distress. She has no wheezes. She has no rales.   Abdominal: Soft. Bowel sounds are normal. She exhibits no distension and no mass. There is no tenderness. There is no rebound and no guarding.   Musculoskeletal: She exhibits no edema.   Neurological: She is alert and oriented to person, place, and time. No cranial nerve deficit.   Skin: Skin is warm and dry. She is not diaphoretic. No pallor.   Psychiatric: She has a normal mood and affect. Her behavior is normal. Judgment and thought content normal.   Nursing note and vitals reviewed.      Assessment:     1. Nonrheumatic aortic valve stenosis     2. Bradycardia     3. Coronary artery disease involving native coronary artery of native heart without angina pectoris     4. Chronic kidney disease (CKD), stage III (moderate)     5. Chronic systolic congestive heart failure (CMS-HCC)     6. Dilated cardiomyopathy (CMS-HCC)     7. Essential hypertension     8. History of MI (myocardial infarction)     9. Edema, unspecified type  furosemide (LASIX) 20 MG Tab       Medical Decision Making:  Today's Assessment / Status / Plan:     82-year-old female with aortic stenosis with low flow and lowgradients a did significantly increased with dobutamine infusion.  She continues to do well. We will make no changes to her medical therapy at this point see her back 6 months.     1. CHFrEF  - cont coreg to 12.5 BID  - cont lisin 20  - cont lasix 20 once daily     2. Aortic stenosis, moderate, low flow  - moderate  - repeat echo in one year     3. MR  - likely function from dilated LV     4. HB s/p PPM    - f/u EP    Thank for you allowing me  to take part in your patient's care, please call should you have any questions or would like to discuss this patient.

## 2018-04-04 DIAGNOSIS — I25.10 CORONARY ARTERY DISEASE INVOLVING NATIVE CORONARY ARTERY OF NATIVE HEART WITHOUT ANGINA PECTORIS: ICD-10-CM

## 2018-04-04 RX ORDER — LISINOPRIL 10 MG/1
10 TABLET ORAL
Qty: 15 TAB | Refills: 0 | Status: SHIPPED | OUTPATIENT
Start: 2018-04-04 | End: 2018-07-10 | Stop reason: SDUPTHER

## 2018-05-30 ENCOUNTER — OFFICE VISIT (OUTPATIENT)
Dept: MEDICAL GROUP | Facility: MEDICAL CENTER | Age: 83
End: 2018-05-30
Payer: MEDICARE

## 2018-05-30 VITALS
WEIGHT: 119 LBS | DIASTOLIC BLOOD PRESSURE: 80 MMHG | HEART RATE: 85 BPM | BODY MASS INDEX: 21.9 KG/M2 | TEMPERATURE: 98.1 F | SYSTOLIC BLOOD PRESSURE: 120 MMHG | RESPIRATION RATE: 16 BRPM | HEIGHT: 62 IN | OXYGEN SATURATION: 97 %

## 2018-05-30 DIAGNOSIS — M81.0 AGE-RELATED OSTEOPOROSIS WITHOUT CURRENT PATHOLOGICAL FRACTURE: ICD-10-CM

## 2018-05-30 DIAGNOSIS — I25.10 CORONARY ARTERY DISEASE INVOLVING NATIVE CORONARY ARTERY OF NATIVE HEART WITHOUT ANGINA PECTORIS: Chronic | ICD-10-CM

## 2018-05-30 DIAGNOSIS — M10.079 ACUTE IDIOPATHIC GOUT OF ANKLE, UNSPECIFIED LATERALITY: ICD-10-CM

## 2018-05-30 DIAGNOSIS — M05.79 RHEUMATOID ARTHRITIS INVOLVING MULTIPLE SITES WITH POSITIVE RHEUMATOID FACTOR (HCC): ICD-10-CM

## 2018-05-30 DIAGNOSIS — I35.0 NONRHEUMATIC AORTIC VALVE STENOSIS: ICD-10-CM

## 2018-05-30 DIAGNOSIS — K21.9 GASTROESOPHAGEAL REFLUX DISEASE WITHOUT ESOPHAGITIS: Chronic | ICD-10-CM

## 2018-05-30 DIAGNOSIS — H04.123 BILATERAL DRY EYES: ICD-10-CM

## 2018-05-30 DIAGNOSIS — N18.30 CHRONIC KIDNEY DISEASE (CKD), STAGE III (MODERATE) (HCC): ICD-10-CM

## 2018-05-30 DIAGNOSIS — I10 ESSENTIAL HYPERTENSION: Chronic | ICD-10-CM

## 2018-05-30 PROCEDURE — 99214 OFFICE O/P EST MOD 30 MIN: CPT | Performed by: INTERNAL MEDICINE

## 2018-05-30 RX ORDER — ALLOPURINOL 100 MG/1
100 TABLET ORAL DAILY
COMMUNITY

## 2018-05-31 NOTE — PROGRESS NOTES
Subjective:     Chief Complaint   Patient presents with   • Follow-Up     Jess Colón is a 83 y.o. female here today for follow-up of her rash especially on her left hand along with coronary artery disease, renal insufficiency, hypertension, gastroesophageal reflux, osteoporosis.    Aortic stenosis  She has mild aortic stenosis as documented on echocardiogram with no recent aggravation of symptoms.    Bilateral dry eyes  She has problems with bilateral dry eyes.  Among the medication she is taking for this is [facility] [facility] which she thinks has been causing irritation of the skin on her left hand.  She stopped the medication and the dermatitis resolved.  She continues taking Restasis.  She reports that she will re-start the [facility] and stop the medication immediately if she notes further dermatitis.    CAD (coronary artery disease)  She has a history of coronary artery disease with no recent chest pain or significant palpitations or change in exercise tolerance.    Chronic kidney disease (CKD), stage III (moderate)  She has mild renal insufficiency with GFR 54.  She is not careful about remaining well hydrated.    Essential hypertension  Blood pressure when she came in today was a little elevated but repeat check was satisfactory.  She continues lisinopril without difficulty.  She denies lightheadedness.    Gastroesophageal reflux disease without esophagitis  Gastroesophageal reflux is under fairly good control with diet and Prilosec.    Idiopathic gout of ankle  She recently had problems with discomfort in 1 ankle.  Uric acid level was elevated and it was thought that this represented gout.  She thus was started on allopurinol.    Osteoporosis  She has osteoporosis for which she continues Fosamax without difficulty.    RA (rheumatoid arthritis)  .She reports that her rheumatoid arthritis is fairly stable and followed closely by Dr. Robb.Ulnar deviation of the digits she thinks is fairly stable       Diagnoses  of Coronary artery disease involving native coronary artery of native heart without angina pectoris, Gastroesophageal reflux disease without esophagitis, Essential hypertension, Rheumatoid arthritis involving multiple sites with positive rheumatoid factor (HCC), Chronic kidney disease (CKD), stage III (moderate), Age-related osteoporosis without current pathological fracture, Bilateral dry eyes, Acute idiopathic gout of ankle, unspecified laterality, and Nonrheumatic aortic valve stenosis were pertinent to this visit.    Allergies: Methotrexate; Remicade [infliximab injection]; Sulfites; and Other environmental  Current medicines (including changes today)  Current Outpatient Prescriptions   Medication Sig Dispense Refill   • allopurinol (ZYLOPRIM) 100 MG Tab Take 100 mg by mouth every day.     • lisinopril (PRINIVIL) 10 MG Tab Take 1 Tab by mouth every day. 15 Tab 0   • NON SPECIFIED      • furosemide (LASIX) 20 MG Tab TAKE 1 TABLET BY MOUTH DAILY 90 Tab 3   • estropipate (OGEN) 0.75 MG Tab TAKE 1 TABLET BY MOUTH  EVERY DAY 90 Tab 3   • omeprazole (PRILOSEC) 20 MG delayed-release capsule TAKE 1 CAPSULE BY MOUTH  EVERY MORNING ON AN EMPTY  STOMACH 90 Cap 3   • carvedilol (COREG) 12.5 MG Tab TAKE 1 TABLET BY MOUTH TWO  TIMES DAILY WITH MEALS 180 Tab 3   • CycloSPORINE (RESTASIS OP) 1 Drop by Ophthalmic route 2 Times a Day. In each eye      • Lifitegrast (XIIDRA) 5 % Solution by Ophthalmic route.     • potassium chloride ER (KLOR-CON) 10 MEQ tablet Take 1 tablet by mouth  every day 90 Tab 3   • doxycycline (VIBRAMYCIN) 100 MG Tab Take 1 Tab by mouth 2 times a day. 6 Tab 0   • alendronate (FOSAMAX) 70 MG Tab Take 70 mg by mouth every 7 days. On Mon     • tocilizumab (ACTEMRA) 400 MG/20ML Solution 400 mg by Intravenous route Q30 DAYS.     • Polyvinyl Alcohol-Povidone (REFRESH OP) 2 Drops by Ophthalmic route 2 Times a Day.     • nitroglycerin (NITROSTAT) 0.4 MG SUBL Place 1 Tab under tongue as needed for Chest Pain. 25  "Tab 11   • Calcium Carbonate-Vitamin D (CALTRATE 600+D PO) Take 1 Tab by mouth 2 Times a Day.     • Multiple Vitamins-Minerals (CENTRUM SILVER PO) Take 1 Tab by mouth every day.     • DUREZOL 0.05 % Emulsion INSTILL 1 GTT INTO OD QID  1   • acetaminophen (TYLENOL) 500 MG Tab Take 2 Tabs by mouth every 6 hours as needed. (Patient taking differently: Take 1,000 mg by mouth as needed.) 21 Tab 0     No current facility-administered medications for this visit.        She  has a past medical history of Abnormal liver function test; Anesthesia; Arthritis; Bilateral dry eyes (5/30/2018); Breath shortness; Bronchitis ( ); Dental disorder; Heart burn; Heart valve disease; Hypertension; Idiopathic gout of ankle (5/30/2018); Indigestion; Myocardial infarct (HCC) ( ); Pacemaker; Pain ( ); Palpitations; Pneumonia (1964); Rheumatoid arthritis(714.0); Snoring; SVT (supraventricular tachycardia) ([facility]); and Unspecified cataract. She also has no past medical history of Cold.    ROS    Patient denies significant change in strength, weight or appetite.  No significant lightheadedness or headaches.  No change in vision, hearing, or swallowing.  She has problems with dry eyes which is unchanged recently.  No new dyspnea, coughing, chest pain, or palpitations.  No indigestion, abdominal pain, or change in bowel habits.  No change in urinating.  No new ankle swelling.  She has noticed a rash or dermatitis especially on the dorsum of her left hand that she thinks is related to medication for her dry eyes.       Objective:     PE:  /80   Pulse 85   Temp 36.7 °C (98.1 °F)   Resp 16   Ht 1.575 m (5' 2\")   Wt 54 kg (119 lb)   SpO2 97%   BMI 21.77 kg/m²    Neck is supple without significant lymphadenopathy or masses.  Lungs are clear with normal breath sounds without wheezes or rales .  Cardiovascular: peripheral circulation is satisfactory, heart sounds are unchanged and unremarkable with a grade 2/6 to 3/6 soft blowing murmur " loudest at the upper right sternal border..  Abdomen is soft, without masses or tenderness, with normal bowel sounds.  Extremities are without significant edema, cyanosis or deformity.  There is ulnar deviation of the digits.  There is a fine scaling rash on the dorsum of the left hand.      Assessment and Plan:   The following treatment plan was discussed  1. Coronary artery disease involving native coronary artery of native heart without angina pectoris      She will report any chest pain and go straight to the emergency room.   2. Gastroesophageal reflux disease without esophagitis      We briefly reviewed conservative therapy.  She will continue her Prilosec.   3. Essential hypertension      No medication change.  We reviewed low-salt diet.   4. Rheumatoid arthritis involving multiple sites with positive rheumatoid factor (HCC)      Continue regular follow-up with Dr. Robb and Actemra infusions.   5. Chronic kidney disease (CKD), stage III (moderate)      She was encouraged to remain well hydrated.   6. Age-related osteoporosis without current pathological fracture      No medication change.  She will continue her vitamin D supplement.  Continue Fosamax for no more than 5 years.   7. Bilateral dry eyes      Follow-up with her ophthalmologist.  Report return of dermatitis when she restarts her [facility]   8. Acute idiopathic gout of ankle, unspecified laterality      Continue allopurinol for now.   9. Nonrheumatic aortic valve stenosis         Followup: Return in about 6 months (around 11/30/2018), or health  wellness, for Long.

## 2018-07-10 DIAGNOSIS — I25.10 CORONARY ARTERY DISEASE INVOLVING NATIVE CORONARY ARTERY OF NATIVE HEART WITHOUT ANGINA PECTORIS: ICD-10-CM

## 2018-07-11 RX ORDER — POTASSIUM CHLORIDE 750 MG/1
TABLET, FILM COATED, EXTENDED RELEASE ORAL
Qty: 30 TAB | Refills: 0 | Status: SHIPPED | OUTPATIENT
Start: 2018-07-11 | End: 2018-08-20 | Stop reason: SDUPTHER

## 2018-07-11 RX ORDER — LISINOPRIL 10 MG/1
TABLET ORAL
Qty: 30 TAB | Refills: 0 | Status: SHIPPED | OUTPATIENT
Start: 2018-07-11 | End: 2018-08-13 | Stop reason: SDUPTHER

## 2018-07-18 ENCOUNTER — NON-PROVIDER VISIT (OUTPATIENT)
Dept: CARDIOLOGY | Facility: MEDICAL CENTER | Age: 83
End: 2018-07-18
Payer: MEDICARE

## 2018-07-18 ENCOUNTER — OFFICE VISIT (OUTPATIENT)
Dept: CARDIOLOGY | Facility: MEDICAL CENTER | Age: 83
End: 2018-07-18
Payer: MEDICARE

## 2018-07-18 VITALS
WEIGHT: 116 LBS | BODY MASS INDEX: 21.35 KG/M2 | HEART RATE: 78 BPM | OXYGEN SATURATION: 96 % | SYSTOLIC BLOOD PRESSURE: 150 MMHG | HEIGHT: 62 IN | DIASTOLIC BLOOD PRESSURE: 80 MMHG

## 2018-07-18 DIAGNOSIS — I25.2 HISTORY OF MI (MYOCARDIAL INFARCTION): ICD-10-CM

## 2018-07-18 DIAGNOSIS — I47.10 SVT (SUPRAVENTRICULAR TACHYCARDIA): ICD-10-CM

## 2018-07-18 DIAGNOSIS — N18.30 CHRONIC KIDNEY DISEASE (CKD), STAGE III (MODERATE) (HCC): ICD-10-CM

## 2018-07-18 DIAGNOSIS — Z00.00 ROUTINE GENERAL MEDICAL EXAMINATION AT A HEALTH CARE FACILITY: ICD-10-CM

## 2018-07-18 DIAGNOSIS — D64.9 ANEMIA, UNSPECIFIED TYPE: ICD-10-CM

## 2018-07-18 DIAGNOSIS — I34.0 MITRAL VALVE INSUFFICIENCY, UNSPECIFIED ETIOLOGY: ICD-10-CM

## 2018-07-18 DIAGNOSIS — M05.79 RHEUMATOID ARTHRITIS INVOLVING MULTIPLE SITES WITH POSITIVE RHEUMATOID FACTOR (HCC): ICD-10-CM

## 2018-07-18 DIAGNOSIS — Z95.0 CARDIAC PACEMAKER IN SITU: ICD-10-CM

## 2018-07-18 DIAGNOSIS — R00.1 BRADYCARDIA: ICD-10-CM

## 2018-07-18 DIAGNOSIS — I25.10 CORONARY ARTERY DISEASE INVOLVING NATIVE CORONARY ARTERY OF NATIVE HEART WITHOUT ANGINA PECTORIS: Chronic | ICD-10-CM

## 2018-07-18 DIAGNOSIS — I10 ESSENTIAL HYPERTENSION: Chronic | ICD-10-CM

## 2018-07-18 DIAGNOSIS — I35.0 NONRHEUMATIC AORTIC VALVE STENOSIS: ICD-10-CM

## 2018-07-18 DIAGNOSIS — I42.0 DILATED CARDIOMYOPATHY (HCC): ICD-10-CM

## 2018-07-18 PROCEDURE — 94618 PULMONARY STRESS TESTING: CPT | Mod: GZ | Performed by: INTERNAL MEDICINE

## 2018-07-18 PROCEDURE — 93280 PM DEVICE PROGR EVAL DUAL: CPT | Performed by: INTERNAL MEDICINE

## 2018-07-18 PROCEDURE — 99214 OFFICE O/P EST MOD 30 MIN: CPT | Mod: 25 | Performed by: INTERNAL MEDICINE

## 2018-07-18 ASSESSMENT — MINNESOTA LIVING WITH HEART FAILURE QUESTIONNAIRE (MLHF)
TOTAL_SCORE: 6
DIFFICULTY SOCIALIZING WITH FAMILY OR FRIENDS: 0
FEELING LIKE A BURDEN TO FAMILY AND FRIENDS: 1
DIFFICULTY SLEEPING WELL AT NIGHT: 0
GIVING YOU SIDE EFFECTS FROM TREATMENTS: 0
MAKING YOU FEEL DEPRESSED: 0
DIFFICULTY GOING AWAY FROM HOME: 0
EATING LESS FOODS YOU LIKE: 0
LOSS OF SELF CONTROL IN YOUR LIFE: 0
DIFFICULTY WORKING TO EARN A LIVING: 0
COSTING YOU MONEY FOR MEDICAL CARE: 0
WALKING ABOUT OR CLIMBING STAIRS DIFFICULT: 0
DIFFICULTY WITH RECREATIONAL PASTIMES, SPORTS, HOBBIES: 0
TIRED, FATIGUED OR LOW ON ENERGY: 1
WORKING AROUND THE HOUSE OR YARD DIFFICULT: 1
MAKING YOU SHORT OF BREATH: 1
DIFFICULTY WITH SEXUAL ACTIVITIES: 0
MAKING YOU WORRY: 0
MAKING YOU STAY IN A HOSPITAL: 0
HAVING TO SIT OR LIE DOWN DURING THE DAY: 2
SWELLING IN ANKLES OR LEGS: 0
DIFFICULTY TO CONCENTRATE OR REMEMBERING THINGS: 0

## 2018-07-18 ASSESSMENT — ENCOUNTER SYMPTOMS
STRIDOR: 0
SHORTNESS OF BREATH: 0
WHEEZING: 0
BRUISES/BLEEDS EASILY: 0
HEMOPTYSIS: 0
CONSTITUTIONAL NEGATIVE: 1
PND: 0
RESPIRATORY NEGATIVE: 1
DIZZINESS: 0
SORE THROAT: 0
CARDIOVASCULAR NEGATIVE: 1
MUSCULOSKELETAL NEGATIVE: 1
COUGH: 0
NEUROLOGICAL NEGATIVE: 1
LOSS OF CONSCIOUSNESS: 0
WEAKNESS: 0
PALPITATIONS: 0
ORTHOPNEA: 0
CHILLS: 0
CLAUDICATION: 0
EYES NEGATIVE: 1
SPUTUM PRODUCTION: 0
GASTROINTESTINAL NEGATIVE: 1
FEVER: 0

## 2018-07-18 ASSESSMENT — 6 MINUTE WALK TEST (6MWT): TOTAL DISTANCE WALKED (METERS): 360

## 2018-07-18 NOTE — PROGRESS NOTES
Subjective:   Jess Colón is a 83 y.o. female who presents today as a follow-up for her aortic valve disease as well as her shortness of breath or extremity edema and her history of heart failure.  Since she was last seen she continues to do very well.  She does not have any chest pain palpitations PND orthopnea.  She has not syncopized.  She is becoming more active by being outside in the summer.  Her blood pressures currently well controlled.    Chief Complaint: Aortic stenosis, heart failure, hypertension    Past Medical History:   Diagnosis Date   • Abnormal liver function test    • Anesthesia     PONV   • Arthritis     Rheumatoid Arthritis x 30 years   • Bilateral dry eyes 5/30/2018   • Breath shortness     with exertion or outdoor allergies   • Bronchitis      10-15 years ago   • Dental disorder     Full dentures   • Heart burn    • Heart valve disease     mitral valve prolapse   • Hypertension    • Idiopathic gout of ankle 5/30/2018   • Indigestion    • Myocardial infarct (HCC)     • Pacemaker    • Pain      feet secondary to RA=2/10   • Palpitations    • Pneumonia 1964   • Rheumatoid arthritis(714.0)    • Snoring    • SVT (supraventricular tachycardia) ([facility])     SVT/A-Flutter   • Unspecified cataract     Surgery L eye     Past Surgical History:   Procedure Laterality Date   • PACEMAKER INSERTION  December 2016    Medtronic Adapta [id number] implanted by Dr. Ang.   • RECOVERY  5/19/2016    Procedure: CATH LAB [facility] WITH A SHOT OF THE AORTIC ROOT DR. NAYAK;  Surgeon: Recoveryonly Surgery;  [place] [facility] [place]  Service:    • RECOVERY  4/27/2016    Procedure: CATH LAB MELVIN WITH ANESTHESIA ;  Surgeon: Recoveryonly Surgery;  [place] [facility] [place]  Service:    • RECOVERY  11/4/2014    Performed by Cath-Recovery Surgery at [facility] SAME DAY [facility] ORS   • GASTROSCOPY WITH BIOPSY  5/7/2014    Performed by Darin Barros M.D. at [facility] ORS   •  "COLONOSCOPY WITH CLIPPING  5/7/2014    Performed by Darin Barros M.D. at [facility] ORS   • BREAST BIOPSY     • HYSTERECTOMY, VAGINAL     • RECTOCELE REPAIR     • TONSILLECTOMY       Family History   Problem Relation Age of Onset   • Heart Disease Brother      History   Smoking Status   • Never Smoker   Smokeless Tobacco   • Never Used     Allergies   Allergen Reactions   • Methotrexate      Pt. States it effects her liver.   • Remicade [Infliximab Injection]      Shaking/hot-flashes   • Sulfites Diarrhea   • Other Environmental Runny Nose and Itching     Plants; \"everything\"     Outpatient Encounter Prescriptions as of 7/18/2018   Medication Sig Dispense Refill   • lisinopril (PRINIVIL) 10 MG Tab TAKE 1 TABLET BY MOUTH  EVERY DAY 30 Tab 0   • potassium chloride ER (KLOR-CON) 10 MEQ tablet TAKE 1 TABLET BY MOUTH  EVERY DAY 30 Tab 0   • allopurinol (ZYLOPRIM) 100 MG Tab Take 100 mg by mouth every day.     • NON SPECIFIED      • furosemide (LASIX) 20 MG Tab TAKE 1 TABLET BY MOUTH DAILY 90 Tab 3   • estropipate (OGEN) 0.75 MG Tab TAKE 1 TABLET BY MOUTH  EVERY DAY 90 Tab 3   • omeprazole (PRILOSEC) 20 MG delayed-release capsule TAKE 1 CAPSULE BY MOUTH  EVERY MORNING ON AN EMPTY  STOMACH 90 Cap 3   • carvedilol (COREG) 12.5 MG Tab TAKE 1 TABLET BY MOUTH TWO  TIMES DAILY WITH MEALS 180 Tab 3   • CycloSPORINE (RESTASIS OP) 1 Drop by Ophthalmic route 2 Times a Day. In each eye      • acetaminophen (TYLENOL) 500 MG Tab Take 2 Tabs by mouth every 6 hours as needed. (Patient taking differently: Take 1,000 mg by mouth as needed.) 21 Tab 0   • doxycycline (VIBRAMYCIN) 100 MG Tab Take 1 Tab by mouth 2 times a day. 6 Tab 0   • alendronate (FOSAMAX) 70 MG Tab Take 70 mg by mouth every 7 days. On Mon     • tocilizumab (ACTEMRA) 400 MG/20ML Solution 400 mg by Intravenous route Q30 DAYS.     • Polyvinyl Alcohol-Povidone (REFRESH OP) 2 Drops by Ophthalmic route 2 Times a Day.     • nitroglycerin (NITROSTAT) 0.4 MG SUBL " "Place 1 Tab under tongue as needed for Chest Pain. 25 Tab 11   • Calcium Carbonate-Vitamin D (CALTRATE 600+D PO) Take 1 Tab by mouth 2 Times a Day.     • Multiple Vitamins-Minerals (CENTRUM SILVER PO) Take 1 Tab by mouth every day.     • DUREZOL 0.05 % Emulsion INSTILL 1 GTT INTO OD QID  1   • Lifitegrast (XIIDRA) 5 % Solution by Ophthalmic route.       No facility-administered encounter medications on file as of 7/18/2018.      Review of Systems   Constitutional: Negative.  Negative for chills, fever and malaise/fatigue.   HENT: Negative.  Negative for sore throat.    Eyes: Negative.    Respiratory: Negative.  Negative for cough, hemoptysis, sputum production, shortness of breath, wheezing and stridor.    Cardiovascular: Negative.  Negative for chest pain, palpitations, orthopnea, claudication, leg swelling and PND.   Gastrointestinal: Negative.    Genitourinary: Negative.    Musculoskeletal: Negative.    Skin: Negative.    Neurological: Negative.  Negative for dizziness, loss of consciousness and weakness.   Endo/Heme/Allergies: Negative.  Does not bruise/bleed easily.   All other systems reviewed and are negative.       Objective:   /80   Pulse 78   Ht 1.575 m (5' 2\")   Wt 52.6 kg (116 lb)   SpO2 96%   BMI 21.22 kg/m²     Physical Exam   Constitutional: She is oriented to person, place, and time. She appears well-developed and well-nourished. No distress.   HENT:   Head: Normocephalic.   Mouth/Throat: Oropharynx is clear and moist.   Eyes: EOM are normal. Pupils are equal, round, and reactive to light. Right eye exhibits no discharge. Left eye exhibits no discharge. No scleral icterus.   Neck: Normal range of motion. Neck supple. No JVD present. No tracheal deviation present.   Cardiovascular: Normal rate, regular rhythm, S1 normal, S2 normal, normal heart sounds, intact distal pulses and normal pulses.  Exam reveals no gallop, no S3, no S4 and no friction rub.    No murmur heard.   No systolic murmur " is present    No diastolic murmur is present   Pulses:       Carotid pulses are 2+ on the right side, and 2+ on the left side.       Radial pulses are 2+ on the right side, and 2+ on the left side.        Dorsalis pedis pulses are 2+ on the right side, and 2+ on the left side.        Posterior tibial pulses are 2+ on the right side, and 2+ on the left side.   Pulmonary/Chest: Effort normal and breath sounds normal. No respiratory distress. She has no wheezes. She has no rales.   Abdominal: Soft. Bowel sounds are normal. She exhibits no distension and no mass. There is no tenderness. There is no rebound and no guarding.   Musculoskeletal: She exhibits no edema.   Neurological: She is alert and oriented to person, place, and time. No cranial nerve deficit.   Skin: Skin is warm and dry. She is not diaphoretic. No pallor.   Psychiatric: She has a normal mood and affect. Her behavior is normal. Judgment and thought content normal.   Nursing note and vitals reviewed.      Assessment:     1. Anemia, unspecified type     2. Nonrheumatic aortic valve stenosis     3. Bradycardia     4. Coronary artery disease involving native coronary artery of native heart without angina pectoris     5. Cardiac pacemaker in situ     6. Chronic kidney disease (CKD), stage III (moderate)     7. Dilated cardiomyopathy (HCC)     8. Essential hypertension     9. History of MI (myocardial infarction)     10. Mitral valve insufficiency, unspecified etiology     11. Rheumatoid arthritis involving multiple sites with positive rheumatoid factor (HCC)     12. SVT (supraventricular tachycardia) ([facility])     13. Routine general medical examination at a health care facility       Echo:  CONCLUSIONS  Compared to the images of the study done 9/13/2016, there has been no   significant changes.  Normal left ventricular systolic function.  Left ventricular ejection fraction is visually estimated to be 60%.  Grade I diastolic dysfunction.  Mild aortic  stenosis.  Vmax is 2.77 m/s. Transvalvular gradients are - Peak: 30.73 mmHg, Mean:   17.02 mmHg.   Mild aortic insufficiency.  Estimated right ventricular systolic pressure  is 35 mmHg.    CTA:  1.  Calcified tricuspid aortic valve measures approximately 440 sq mm    2.  Satisfactory iliofemoral runoff for access on both sides. There is a small right femoral hernia which contains small bowel but this is above the level of the acetabular roof and is therefore of unlikely significance    3.  Incidental findings including accessory left renal artery, borderline splenomegaly, colonic diverticulosis, benign left adrenal adenoma, indeterminate right thyroid lobe mass which could be better characterized with ultrasound    Lab Results   Component Value Date/Time    CHOLSTRLTOT 165 03/15/2016 09:42 AM    CHOLSTRLTOT 159 10/31/2014 05:05 AM    LDL 80 03/15/2016 09:42 AM    LDL 88 10/31/2014 05:05 AM    HDL 60 03/15/2016 09:42 AM    HDL 48 10/31/2014 05:05 AM    TRIGLYCERIDE 124 03/15/2016 09:42 AM    TRIGLYCERIDE 116 10/31/2014 05:05 AM       Lab Results   Component Value Date/Time    SODIUM 137 12/05/2016 02:48 PM    POTASSIUM 4.4 12/05/2016 02:48 PM    CHLORIDE 107 12/05/2016 02:48 PM    CO2 23 12/05/2016 02:48 PM    GLUCOSE 89 12/05/2016 02:48 PM    BUN 23 (H) 12/05/2016 02:48 PM    CREATININE 0.98 12/05/2016 02:48 PM    CREATININE 1.3 09/16/2005 02:05 PM    BUNCREATRAT 18 08/04/2016 11:33 AM     Lab Results   Component Value Date/Time    ALKPHOSPHAT 54 09/04/2016 08:42 PM    ASTSGOT 30 09/04/2016 08:42 PM    ALTSGPT 15 09/04/2016 08:42 PM    TBILIRUBIN 0.9 09/04/2016 08:42 PM              Medical Decision Making:  Today's Assessment / Status / Plan:     82-year-old female with aortic stenosis with low flow and lowgradients a did significantly increased with dobutamine infusion. Again she continues to do very well.  We will see her back in 6 months with no changes today.    1. CHFrEF  - cont coreg to 12.5 BID  - cont lisin  20  - cont lasix 20 once daily     2. Aortic stenosis, moderate, low flow  - moderate  - repeat echo in one year     3. MR  - likely function from dilated LV     4. HB s/p PPM    - f/u EP    Thank for you allowing me to take part in your patient's care, please call should you have any questions or would like to discuss this patient.    Physical Exam   Constitutional: She is oriented to person, place, and time. She appears well-developed and well-nourished. No distress.   HENT:   Head: Normocephalic.   Mouth/Throat: Oropharynx is clear and moist.   Eyes: EOM are normal. Pupils are equal, round, and reactive to light. Right eye exhibits no discharge. Left eye exhibits no discharge. No scleral icterus.   Neck: Normal range of motion. Neck supple. No JVD present. No tracheal deviation present.   Cardiovascular: Normal rate, regular rhythm, S1 normal, S2 normal, normal heart sounds, intact distal pulses and normal pulses.  Exam reveals no gallop, no S3, no S4 and no friction rub.    No murmur heard.   No systolic murmur is present    No diastolic murmur is present   Pulses:       Carotid pulses are 2+ on the right side, and 2+ on the left side.       Radial pulses are 2+ on the right side, and 2+ on the left side.        Dorsalis pedis pulses are 2+ on the right side, and 2+ on the left side.        Posterior tibial pulses are 2+ on the right side, and 2+ on the left side.   Pulmonary/Chest: Effort normal and breath sounds normal. No respiratory distress. She has no wheezes. She has no rales.   Abdominal: Soft. Bowel sounds are normal. She exhibits no distension and no mass. There is no tenderness. There is no rebound and no guarding.   Musculoskeletal: She exhibits no edema.   Neurological: She is alert and oriented to person, place, and time. No cranial nerve deficit.   Skin: Skin is warm and dry. She is not diaphoretic. No pallor.   Psychiatric: She has a normal mood and affect. Her behavior is normal. Judgment and  thought content normal.   Nursing note and vitals reviewed.

## 2018-07-18 NOTE — LETTER
[facility]   [address], [facility] [address]  [place] [address]  Phone: [phone]  Fax: [phone]              Jess Colón  11/13/1934    Encounter Date: 7/18/2018    Terry Tatum M.D.          PROGRESS NOTE:  Subjective:   Jess Colón is a 83 y.o. female who presents today as a follow-up for her aortic valve disease as well as her shortness of breath or extremity edema and her history of heart failure.  Since she was last seen she continues to do very well.  She does not have any chest pain palpitations PND orthopnea.  She has not syncopized.  She is becoming more active by being outside in the summer.  Her blood pressures currently well controlled.    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 Ang.   • RECOVERY  5/19/2016    Procedure: CATH LAB C, [facility] WITH A SHOT OF THE AORTIC ROOT DR. NAYAK;  Surgeon: Arabella Surgery;  Location: SURGERY PRE-POST PROC UNIT [facility];  Service:    • RECOVERY  4/27/2016    Procedure: CATH LAB MELVIN WITH ANESTHESIA ;  Surgeon: Arabella  "Surgery;  Location: SURGERY PRE-[place] [facility];  Service:    • RECOVERY  11/4/2014    Performed by Cath-Recovery Surgery at SURGERY SAME DAY [facility] ORS   • GASTROSCOPY WITH BIOPSY  5/7/2014    Performed by Darin Barros M.D. at SURGERY [facility] ORS   • COLONOSCOPY WITH CLIPPING  5/7/2014    Performed by Darin Barros M.D. at SURGERY [facility] ORS   • BREAST BIOPSY     • HYSTERECTOMY, VAGINAL     • RECTOCELE REPAIR     • TONSILLECTOMY       Family History   Problem Relation Age of Onset   • Heart Disease Brother      History   Smoking Status   • Never Smoker   Smokeless Tobacco   • Never Used     Allergies   Allergen Reactions   • Methotrexate      Pt. States it effects her liver.   • Remicade [Infliximab Injection]      Shaking/hot-flashes   • Sulfites Diarrhea   • Other Environmental Runny Nose and Itching     Plants; \"everything\"     Outpatient Encounter Prescriptions as of 7/18/2018   Medication Sig Dispense Refill   • lisinopril (PRINIVIL) 10 MG Tab TAKE 1 TABLET BY MOUTH  EVERY DAY 30 Tab 0   • potassium chloride ER (KLOR-CON) 10 MEQ tablet TAKE 1 TABLET BY MOUTH  EVERY DAY 30 Tab 0   • allopurinol (ZYLOPRIM) 100 MG Tab Take 100 mg by mouth every day.     • NON SPECIFIED      • furosemide (LASIX) 20 MG Tab TAKE 1 TABLET BY MOUTH DAILY 90 Tab 3   • estropipate (OGEN) 0.75 MG Tab TAKE 1 TABLET BY MOUTH  EVERY DAY 90 Tab 3   • omeprazole (PRILOSEC) 20 MG delayed-release capsule TAKE 1 CAPSULE BY MOUTH  EVERY MORNING ON AN EMPTY  STOMACH 90 Cap 3   • carvedilol (COREG) 12.5 MG Tab TAKE 1 TABLET BY MOUTH TWO  TIMES DAILY WITH MEALS 180 Tab 3   • CycloSPORINE (RESTASIS OP) 1 Drop by Ophthalmic route 2 Times a Day. In each eye      • acetaminophen (TYLENOL) 500 MG Tab Take 2 Tabs by mouth every 6 hours as needed. (Patient taking differently: Take 1,000 mg by mouth as needed.) 21 Tab 0   • doxycycline (VIBRAMYCIN) 100 MG Tab Take 1 Tab by mouth 2 times a day. 6 Tab 0   • alendronate (FOSAMAX) " "70 MG Tab Take 70 mg by mouth every 7 days. On Mon     • tocilizumab (ACTEMRA) 400 MG/20ML Solution 400 mg by Intravenous route Q30 DAYS.     • Polyvinyl Alcohol-Povidone (REFRESH OP) 2 Drops by Ophthalmic route 2 Times a Day.     • nitroglycerin (NITROSTAT) 0.4 MG SUBL Place 1 Tab under tongue as needed for Chest Pain. 25 Tab 11   • Calcium Carbonate-Vitamin D (CALTRATE 600+D PO) Take 1 Tab by mouth 2 Times a Day.     • Multiple Vitamins-Minerals (CENTRUM SILVER PO) Take 1 Tab by mouth every day.     • DUREZOL 0.05 % Emulsion INSTILL 1 GTT INTO OD QID  1   • Lifitegrast (XIIDRA) 5 % Solution by Ophthalmic route.       No facility-administered encounter medications on file as of 7/18/2018.      Review of Systems   Constitutional: Negative.  Negative for chills, fever and malaise/fatigue.   HENT: Negative.  Negative for sore throat.    Eyes: Negative.    Respiratory: Negative.  Negative for cough, hemoptysis, sputum production, shortness of breath, wheezing and stridor.    Cardiovascular: Negative.  Negative for chest pain, palpitations, orthopnea, claudication, leg swelling and PND.   Gastrointestinal: Negative.    Genitourinary: Negative.    Musculoskeletal: Negative.    Skin: Negative.    Neurological: Negative.  Negative for dizziness, loss of consciousness and weakness.   Endo/Heme/Allergies: Negative.  Does not bruise/bleed easily.   All other systems reviewed and are negative.       Objective:   /80   Pulse 78   Ht 1.575 m (5' 2\")   Wt 52.6 kg (116 lb)   SpO2 96%   BMI 21.22 kg/m²      Physical Exam   Constitutional: She is oriented to person, place, and time. She appears well-developed and well-nourished. No distress.   HENT:   Head: Normocephalic.   Mouth/Throat: Oropharynx is clear and moist.   Eyes: EOM are normal. Pupils are equal, round, and reactive to light. Right eye exhibits no discharge. Left eye exhibits no discharge. No scleral icterus.   Neck: Normal range of motion. Neck supple. No " JVD present. No tracheal deviation present.   Cardiovascular: Normal rate, regular rhythm, S1 normal, S2 normal, normal heart sounds, intact distal pulses and normal pulses.  Exam reveals no gallop, no S3, no S4 and no friction rub.    No murmur heard.   No systolic murmur is present    No diastolic murmur is present   Pulses:       Carotid pulses are 2+ on the right side, and 2+ on the left side.       Radial pulses are 2+ on the right side, and 2+ on the left side.        Dorsalis pedis pulses are 2+ on the right side, and 2+ on the left side.        Posterior tibial pulses are 2+ on the right side, and 2+ on the left side.   Pulmonary/Chest: Effort normal and breath sounds normal. No respiratory distress. She has no wheezes. She has no rales.   Abdominal: Soft. Bowel sounds are normal. She exhibits no distension and no mass. There is no tenderness. There is no rebound and no guarding.   Musculoskeletal: She exhibits no edema.   Neurological: She is alert and oriented to person, place, and time. No cranial nerve deficit.   Skin: Skin is warm and dry. She is not diaphoretic. No pallor.   Psychiatric: She has a normal mood and affect. Her behavior is normal. Judgment and thought content normal.   Nursing note and vitals reviewed.      Assessment:     1. Anemia, unspecified type     2. Nonrheumatic aortic valve stenosis     3. Bradycardia     4. Coronary artery disease involving native coronary artery of native heart without angina pectoris     5. Cardiac pacemaker in situ     6. Chronic kidney disease (CKD), stage III (moderate)     7. Dilated cardiomyopathy (HCC)     8. Essential hypertension     9. History of MI (myocardial infarction)     10. Mitral valve insufficiency, unspecified etiology     11. Rheumatoid arthritis involving multiple sites with positive rheumatoid factor (HCC)     12. SVT (supraventricular tachycardia) ([facility])     13. Routine general medical examination at a health care facility        Echo:  CONCLUSIONS  Compared to the images of the study done 9/13/2016, there has been no   significant changes.  Normal left ventricular systolic function.  Left ventricular ejection fraction is visually estimated to be 60%.  Grade I diastolic dysfunction.  Mild aortic stenosis.  Vmax is 2.77 m/s. Transvalvular gradients are - Peak: 30.73 mmHg, Mean:   17.02 mmHg.   Mild aortic insufficiency.  Estimated right ventricular systolic pressure  is 35 mmHg.    CTA:  1.  Calcified tricuspid aortic valve measures approximately 440 sq mm    2.  Satisfactory iliofemoral runoff for access on both sides. There is a small right femoral hernia which contains small bowel but this is above the level of the acetabular roof and is therefore of unlikely significance    3.  Incidental findings including accessory left renal artery, borderline splenomegaly, colonic diverticulosis, benign left adrenal adenoma, indeterminate right thyroid lobe mass which could be better characterized with ultrasound    Lab Results   Component Value Date/Time    CHOLSTRLTOT 165 03/15/2016 09:42 AM    CHOLSTRLTOT 159 10/31/2014 05:05 AM    LDL 80 03/15/2016 09:42 AM    LDL 88 10/31/2014 05:05 AM    HDL 60 03/15/2016 09:42 AM    HDL 48 10/31/2014 05:05 AM    TRIGLYCERIDE 124 03/15/2016 09:42 AM    TRIGLYCERIDE 116 10/31/2014 05:05 AM       Lab Results   Component Value Date/Time    SODIUM 137 12/05/2016 02:48 PM    POTASSIUM 4.4 12/05/2016 02:48 PM    CHLORIDE 107 12/05/2016 02:48 PM    CO2 23 12/05/2016 02:48 PM    GLUCOSE 89 12/05/2016 02:48 PM    BUN 23 (H) 12/05/2016 02:48 PM    CREATININE 0.98 12/05/2016 02:48 PM    CREATININE 1.3 09/16/2005 02:05 PM    BUNCREATRAT 18 08/04/2016 11:33 AM     Lab Results   Component Value Date/Time    ALKPHOSPHAT 54 09/04/2016 08:42 PM    ASTSGOT 30 09/04/2016 08:42 PM    ALTSGPT 15 09/04/2016 08:42 PM    TBILIRUBIN 0.9 09/04/2016 08:42 PM              Medical Decision Making:  Today's Assessment / Status / Plan:      82-year-old female with aortic stenosis with low flow and lowgradients a did significantly increased with dobutamine infusion. Again she continues to do very well.  We will see her back in 6 months with no changes today.    1. CHFrEF  - cont coreg to 12.5 BID  - cont lisin 20  - cont lasix 20 once daily     2. Aortic stenosis, moderate, low flow  - moderate  - repeat echo in one year     3. MR  - likely function from dilated LV     4. HB s/p PPM    - f/u EP    Thank for you allowing me to take part in your patient's care, please call should you have any questions or would like to discuss this patient.    Physical Exam   Constitutional: She is oriented to person, place, and time. She appears well-developed and well-nourished. No distress.   HENT:   Head: Normocephalic.   Mouth/Throat: Oropharynx is clear and moist.   Eyes: EOM are normal. Pupils are equal, round, and reactive to light. Right eye exhibits no discharge. Left eye exhibits no discharge. No scleral icterus.   Neck: Normal range of motion. Neck supple. No JVD present. No tracheal deviation present.   Cardiovascular: Normal rate, regular rhythm, S1 normal, S2 normal, normal heart sounds, intact distal pulses and normal pulses.  Exam reveals no gallop, no S3, no S4 and no friction rub.    No murmur heard.   No systolic murmur is present    No diastolic murmur is present   Pulses:       Carotid pulses are 2+ on the right side, and 2+ on the left side.       Radial pulses are 2+ on the right side, and 2+ on the left side.        Dorsalis pedis pulses are 2+ on the right side, and 2+ on the left side.        Posterior tibial pulses are 2+ on the right side, and 2+ on the left side.   Pulmonary/Chest: Effort normal and breath sounds normal. No respiratory distress. She has no wheezes. She has no rales.   Abdominal: Soft. Bowel sounds are normal. She exhibits no distension and no mass. There is no tenderness. There is no rebound and no guarding.    Musculoskeletal: She exhibits no edema.   Neurological: She is alert and oriented to person, place, and time. No cranial nerve deficit.   Skin: Skin is warm and dry. She is not diaphoretic. No pallor.   Psychiatric: She has a normal mood and affect. Her behavior is normal. Judgment and thought content normal.   Nursing note and vitals reviewed.          Nick Mercer M.D.  [address] [place] [address] [place] [id number]  VIA In Basket

## 2018-08-13 DIAGNOSIS — I25.10 CORONARY ARTERY DISEASE INVOLVING NATIVE CORONARY ARTERY OF NATIVE HEART WITHOUT ANGINA PECTORIS: ICD-10-CM

## 2018-08-14 RX ORDER — LISINOPRIL 10 MG/1
TABLET ORAL
Qty: 90 TAB | Refills: 3 | Status: SHIPPED | OUTPATIENT
Start: 2018-08-14 | End: 2019-06-17 | Stop reason: SDUPTHER

## 2018-08-20 DIAGNOSIS — I25.10 CORONARY ARTERY DISEASE INVOLVING NATIVE CORONARY ARTERY OF NATIVE HEART WITHOUT ANGINA PECTORIS: ICD-10-CM

## 2018-08-21 RX ORDER — POTASSIUM CHLORIDE 750 MG/1
TABLET, FILM COATED, EXTENDED RELEASE ORAL
Qty: 90 TAB | Refills: 3 | Status: SHIPPED | OUTPATIENT
Start: 2018-08-21 | End: 2019-07-16 | Stop reason: SDUPTHER

## 2018-10-30 DIAGNOSIS — R60.9 EDEMA, UNSPECIFIED TYPE: ICD-10-CM

## 2018-10-30 DIAGNOSIS — I50.22 CHRONIC SYSTOLIC CONGESTIVE HEART FAILURE (HCC): ICD-10-CM

## 2018-10-31 RX ORDER — FUROSEMIDE 20 MG/1
TABLET ORAL
Qty: 90 TAB | Refills: 2 | Status: SHIPPED | OUTPATIENT
Start: 2018-10-31 | End: 2019-07-16 | Stop reason: SDUPTHER

## 2018-10-31 RX ORDER — CARVEDILOL 12.5 MG/1
TABLET ORAL
Qty: 180 TAB | Refills: 2 | Status: SHIPPED | OUTPATIENT
Start: 2018-10-31 | End: 2019-07-16 | Stop reason: SDUPTHER

## 2018-12-05 RX ORDER — OMEPRAZOLE 20 MG/1
CAPSULE, DELAYED RELEASE ORAL
Qty: 90 CAP | Refills: 3 | Status: SHIPPED | OUTPATIENT
Start: 2018-12-05 | End: 2019-11-30

## 2019-02-19 ENCOUNTER — NON-PROVIDER VISIT (OUTPATIENT)
Dept: CARDIOLOGY | Facility: MEDICAL CENTER | Age: 84
End: 2019-02-19
Payer: MEDICARE

## 2019-02-19 DIAGNOSIS — R00.1 BRADYCARDIA: ICD-10-CM

## 2019-02-19 PROCEDURE — 93280 PM DEVICE PROGR EVAL DUAL: CPT | Performed by: INTERNAL MEDICINE

## 2019-03-11 ENCOUNTER — OFFICE VISIT (OUTPATIENT)
Dept: CARDIOLOGY | Facility: MEDICAL CENTER | Age: 84
End: 2019-03-11
Payer: MEDICARE

## 2019-03-11 VITALS
SYSTOLIC BLOOD PRESSURE: 156 MMHG | OXYGEN SATURATION: 97 % | DIASTOLIC BLOOD PRESSURE: 84 MMHG | HEART RATE: 80 BPM | WEIGHT: 115 LBS | HEIGHT: 62 IN | BODY MASS INDEX: 21.16 KG/M2

## 2019-03-11 DIAGNOSIS — I42.0 DILATED CARDIOMYOPATHY (HCC): ICD-10-CM

## 2019-03-11 DIAGNOSIS — I35.0 NONRHEUMATIC AORTIC VALVE STENOSIS: ICD-10-CM

## 2019-03-11 DIAGNOSIS — I34.0 MITRAL VALVE INSUFFICIENCY, UNSPECIFIED ETIOLOGY: ICD-10-CM

## 2019-03-11 DIAGNOSIS — I47.10 SVT (SUPRAVENTRICULAR TACHYCARDIA): ICD-10-CM

## 2019-03-11 DIAGNOSIS — D64.9 ANEMIA, UNSPECIFIED TYPE: ICD-10-CM

## 2019-03-11 DIAGNOSIS — N18.30 CHRONIC KIDNEY DISEASE (CKD), STAGE III (MODERATE) (HCC): ICD-10-CM

## 2019-03-11 DIAGNOSIS — I10 ESSENTIAL HYPERTENSION: Chronic | ICD-10-CM

## 2019-03-11 DIAGNOSIS — I25.10 CORONARY ARTERY DISEASE INVOLVING NATIVE CORONARY ARTERY OF NATIVE HEART WITHOUT ANGINA PECTORIS: Chronic | ICD-10-CM

## 2019-03-11 DIAGNOSIS — I25.2 HISTORY OF MI (MYOCARDIAL INFARCTION): ICD-10-CM

## 2019-03-11 DIAGNOSIS — Z95.0 CARDIAC PACEMAKER IN SITU: ICD-10-CM

## 2019-03-11 PROCEDURE — 99214 OFFICE O/P EST MOD 30 MIN: CPT | Performed by: INTERNAL MEDICINE

## 2019-03-11 ASSESSMENT — ENCOUNTER SYMPTOMS
EYES NEGATIVE: 1
WEAKNESS: 0
CLAUDICATION: 0
PND: 0
SORE THROAT: 0
SHORTNESS OF BREATH: 0
STRIDOR: 0
RESPIRATORY NEGATIVE: 1
SPUTUM PRODUCTION: 0
DIZZINESS: 0
ORTHOPNEA: 0
CONSTITUTIONAL NEGATIVE: 1
CARDIOVASCULAR NEGATIVE: 1
FEVER: 0
HEMOPTYSIS: 0
LOSS OF CONSCIOUSNESS: 0
WHEEZING: 0
GASTROINTESTINAL NEGATIVE: 1
BRUISES/BLEEDS EASILY: 0
PALPITATIONS: 0
NEUROLOGICAL NEGATIVE: 1
MUSCULOSKELETAL NEGATIVE: 1
CHILLS: 0
COUGH: 0

## 2019-03-11 NOTE — PROGRESS NOTES
Chief Complaint   Patient presents with   • Bradycardia     F/V: 8 MO       Subjective:   Jess Colón is a 84 y.o. female who presents today as a follow-up for her aortic stenosis and hypertension.  Since he was seen she underwent some eye surgery.  She is to have a lot of pain from this.  She has had no syncopal chest pain palpitations or PND.  She is doing well on the Lasix.  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 Ang.   • RECOVERY  5/19/2016    Procedure: CATH LAB [facility], [facility] WITH A SHOT OF THE AORTIC ROOT DR. NAYAK;  Surgeon: Recoveryonly Surgery;  [place] [facility]  Service:    • RECOVERY  4/27/2016    Procedure: CATH LAB MELVIN WITH ANESTHESIA ;  Surgeon: Recoveryonly Surgery;  [place] [facility]  Service:    • RECOVERY  11/4/2014    Performed by Cath-Recovery Surgery at [facility] SAME DAY [facility] ORS   • GASTROSCOPY WITH BIOPSY  5/7/2014    Performed by Darin Barros M.D. at [facility] ORS   • COLONOSCOPY WITH CLIPPING  5/7/2014    Performed by Darin Barros  "M.D. at SURGERY [facility] ORS   • BREAST BIOPSY     • HYSTERECTOMY, VAGINAL     • RECTOCELE REPAIR     • TONSILLECTOMY       Family History   Problem Relation Age of Onset   • Heart Disease Brother      Social History     Social History   • Marital status:      Spouse name: N/A   • Number of children: N/A   • Years of education: N/A     Occupational History   • Not on file.     Social History Main Topics   • Smoking status: Never Smoker   • Smokeless tobacco: Never Used   • Alcohol use Yes      Comment: Occasionally   • Drug use: No   • Sexual activity: No     Other Topics Concern   • Not on file     Social History Narrative   • No narrative on file     Allergies   Allergen Reactions   • Methotrexate      Pt. States it effects her liver.   • Remicade [Infliximab Injection]      Shaking/hot-flashes   • Sulfites Diarrhea   • Other Environmental Runny Nose and Itching     Plants; \"everything\"     Outpatient Encounter Prescriptions as of 3/11/2019   Medication Sig Dispense Refill   • omeprazole (PRILOSEC) 20 MG delayed-release capsule TAKE 1 CAPSULE BY MOUTH  EVERY MORNING ON AN EMPTY  STOMACH 90 Cap 3   • carvedilol (COREG) 12.5 MG Tab TAKE 1 TABLET BY MOUTH TWO  TIMES DAILY WITH MEALS 180 Tab 2   • furosemide (LASIX) 20 MG Tab TAKE 1 TABLET BY MOUTH  DAILY 90 Tab 2   • potassium chloride ER (KLOR-CON) 10 MEQ tablet TAKE 1 TABLET BY MOUTH  EVERY DAY 90 Tab 3   • lisinopril (PRINIVIL) 10 MG Tab TAKE 1 TABLET BY MOUTH  EVERY DAY 90 Tab 3   • allopurinol (ZYLOPRIM) 100 MG Tab Take 100 mg by mouth every day.     • NON SPECIFIED      • estropipate (OGEN) 0.75 MG Tab TAKE 1 TABLET BY MOUTH  EVERY DAY 90 Tab 3   • DUREZOL 0.05 % Emulsion INSTILL 1 GTT INTO OD QID  1   • CycloSPORINE (RESTASIS OP) 1 Drop by Ophthalmic route 2 Times a Day. In each eye      • Lifitegrast (XIIDRA) 5 % Solution by Ophthalmic route.     • alendronate (FOSAMAX) 70 MG Tab Take 70 mg by mouth every 7 days. On Mon     • tocilizumab (ACTEMRA) " "400 MG/20ML Solution 400 mg by Intravenous route Q30 DAYS.     • Polyvinyl Alcohol-Povidone (REFRESH OP) 2 Drops by Ophthalmic route 2 Times a Day.     • Calcium Carbonate-Vitamin D (CALTRATE 600+D PO) Take 1 Tab by mouth 2 Times a Day.     • Multiple Vitamins-Minerals (CENTRUM SILVER PO) Take 1 Tab by mouth every day.     • [DISCONTINUED] acetaminophen (TYLENOL) 500 MG Tab Take 2 Tabs by mouth every 6 hours as needed. (Patient taking differently: Take 1,000 mg by mouth as needed.) 21 Tab 0   • [DISCONTINUED] doxycycline (VIBRAMYCIN) 100 MG Tab Take 1 Tab by mouth 2 times a day. (Patient not taking: Reported on 3/11/2019) 6 Tab 0   • nitroglycerin (NITROSTAT) 0.4 MG SUBL Place 1 Tab under tongue as needed for Chest Pain. 25 Tab 11     No facility-administered encounter medications on file as of 3/11/2019.      Review of Systems   Constitutional: Negative.  Negative for chills, fever and malaise/fatigue.   HENT: Negative.  Negative for sore throat.    Eyes: Negative.    Respiratory: Negative.  Negative for cough, hemoptysis, sputum production, shortness of breath, wheezing and stridor.    Cardiovascular: Negative.  Negative for chest pain, palpitations, orthopnea, claudication, leg swelling and PND.   Gastrointestinal: Negative.    Genitourinary: Negative.    Musculoskeletal: Negative.    Skin: Negative.    Neurological: Negative.  Negative for dizziness, loss of consciousness and weakness.   Endo/Heme/Allergies: Negative.  Does not bruise/bleed easily.   All other systems reviewed and are negative.       Objective:   /84 (BP Location: Left arm, Patient Position: Sitting, BP Cuff Size: Adult)   Pulse 80   Ht 1.575 m (5' 2\")   Wt 52.2 kg (115 lb)   SpO2 97%   BMI 21.03 kg/m²     Physical Exam   Constitutional: She appears well-developed and well-nourished. No distress.   HENT:   Head: Normocephalic and atraumatic.   Right Ear: External ear normal.   Left Ear: External ear normal.   Nose: Nose normal. "   Mouth/Throat: No oropharyngeal exudate.   Eyes: Pupils are equal, round, and reactive to light. Conjunctivae and EOM are normal. Right eye exhibits no discharge. Left eye exhibits no discharge. No scleral icterus.   Neck: Neck supple. No JVD present.   Cardiovascular: Normal rate, regular rhythm and intact distal pulses.  Exam reveals no gallop and no friction rub.    No murmur heard.  Pulmonary/Chest: Effort normal. No stridor. No respiratory distress. She has no wheezes. She has no rales. She exhibits no tenderness.   Abdominal: Soft. She exhibits no distension. There is no guarding.   Musculoskeletal: Normal range of motion. She exhibits no edema, tenderness or deformity.   Neurological: She is alert. She has normal reflexes. She displays normal reflexes. No cranial nerve deficit. She exhibits normal muscle tone. Coordination normal.   Skin: Skin is warm and dry. No rash noted. She is not diaphoretic. No erythema. No pallor.   Psychiatric: She has a normal mood and affect. Her behavior is normal. Judgment and thought content normal.   Nursing note and vitals reviewed.      Assessment:     1. Anemia, unspecified type     2. Coronary artery disease involving native coronary artery of native heart without angina pectoris  EC-ECHOCARDIOGRAM COMPLETE W/O CONT   3. Cardiac pacemaker in situ  EC-ECHOCARDIOGRAM COMPLETE W/O CONT   4. Chronic kidney disease (CKD), stage III (moderate) ([facility])     5. Dilated cardiomyopathy ([facility])     6. Essential hypertension     7. History of MI (myocardial infarction)  EC-ECHOCARDIOGRAM COMPLETE W/O CONT   8. SVT (supraventricular tachycardia) ([facility])     9. Nonrheumatic aortic valve stenosis  EC-ECHOCARDIOGRAM COMPLETE W/O CONT   10. Mitral valve insufficiency, unspecified etiology         Medical Decision Making:  Today's Assessment / Status / Plan:     84-year-old female with at least moderate aortic stenosis hypertension and CKD.  She is doing well with no changes in her clinical  status.  We will repeat her echocardiogram.  I will see her back in 6 months.    Thank for you allowing me to take part in your patient's care, please call should you have any questions or would like to discuss this patient.

## 2019-03-11 NOTE — LETTER
[facility]   [address], [facility] [address]  [place] [address]  Phone: [phone]  Fax: [phone]              Jess Colón  11/13/1934    Encounter Date: 3/11/2019    Terry Tatum M.D.          PROGRESS NOTE:  Chief Complaint   Patient presents with   • Bradycardia     F/V: 8 MO       Subjective:   Jess Colón is a 84 y.o. female who presents today as a follow-up for her aortic stenosis and hypertension.  Since he was seen she underwent some eye surgery.  She is to have a lot of pain from this.  She has had no syncopal chest pain palpitations or PND.  She is doing well on the Lasix.  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 Ang.   • RECOVERY  5/19/2016    Procedure: CATH LAB C, [facility] WITH A SHOT OF THE AORTIC ROOT DR. NAYAK;  Surgeon: Recoveryonly Surgery;  [place] [facility] [place]  Service:    • RECOVERY  4/27/2016    Procedure: CATH LAB MELVIN WITH ANESTHESIA ;  Surgeon: Arabella Surgery;  [place] [facility] [place]  Service:    •  "RECOVERY  11/4/2014    Performed by Cath-Recovery Surgery at SURGERY SAME DAY [facility] ORS   • GASTROSCOPY WITH BIOPSY  5/7/2014    Performed by Darin Barros M.D. at SURGERY [facility] ORS   • COLONOSCOPY WITH CLIPPING  5/7/2014    Performed by Darin Barros M.D. at SURGERY [facility] ORS   • BREAST BIOPSY     • HYSTERECTOMY, VAGINAL     • RECTOCELE REPAIR     • TONSILLECTOMY       Family History   Problem Relation Age of Onset   • Heart Disease Brother      Social History     Social History   • Marital status:      Spouse name: N/A   • Number of children: N/A   • Years of education: N/A     Occupational History   • Not on file.     Social History Main Topics   • Smoking status: Never Smoker   • Smokeless tobacco: Never Used   • Alcohol use Yes      Comment: Occasionally   • Drug use: No   • Sexual activity: No     Other Topics Concern   • Not on file     Social History Narrative   • No narrative on file     Allergies   Allergen Reactions   • Methotrexate      Pt. States it effects her liver.   • Remicade [Infliximab Injection]      Shaking/hot-flashes   • Sulfites Diarrhea   • Other Environmental Runny Nose and Itching     Plants; \"everything\"     Outpatient Encounter Prescriptions as of 3/11/2019   Medication Sig Dispense Refill   • omeprazole (PRILOSEC) 20 MG delayed-release capsule TAKE 1 CAPSULE BY MOUTH  EVERY MORNING ON AN EMPTY  STOMACH 90 Cap 3   • carvedilol (COREG) 12.5 MG Tab TAKE 1 TABLET BY MOUTH TWO  TIMES DAILY WITH MEALS 180 Tab 2   • furosemide (LASIX) 20 MG Tab TAKE 1 TABLET BY MOUTH  DAILY 90 Tab 2   • potassium chloride ER (KLOR-CON) 10 MEQ tablet TAKE 1 TABLET BY MOUTH  EVERY DAY 90 Tab 3   • lisinopril (PRINIVIL) 10 MG Tab TAKE 1 TABLET BY MOUTH  EVERY DAY 90 Tab 3   • allopurinol (ZYLOPRIM) 100 MG Tab Take 100 mg by mouth every day.     • NON SPECIFIED      • estropipate (OGEN) 0.75 MG Tab TAKE 1 TABLET BY MOUTH  EVERY DAY 90 Tab 3   • DUREZOL 0.05 % Emulsion INSTILL 1 " "GTT INTO OD QID  1   • CycloSPORINE (RESTASIS OP) 1 Drop by Ophthalmic route 2 Times a Day. In each eye      • Lifitegrast (XIIDRA) 5 % Solution by Ophthalmic route.     • alendronate (FOSAMAX) 70 MG Tab Take 70 mg by mouth every 7 days. On Mon     • tocilizumab (ACTEMRA) 400 MG/20ML Solution 400 mg by Intravenous route Q30 DAYS.     • Polyvinyl Alcohol-Povidone (REFRESH OP) 2 Drops by Ophthalmic route 2 Times a Day.     • Calcium Carbonate-Vitamin D (CALTRATE 600+D PO) Take 1 Tab by mouth 2 Times a Day.     • Multiple Vitamins-Minerals (CENTRUM SILVER PO) Take 1 Tab by mouth every day.     • [DISCONTINUED] acetaminophen (TYLENOL) 500 MG Tab Take 2 Tabs by mouth every 6 hours as needed. (Patient taking differently: Take 1,000 mg by mouth as needed.) 21 Tab 0   • [DISCONTINUED] doxycycline (VIBRAMYCIN) 100 MG Tab Take 1 Tab by mouth 2 times a day. (Patient not taking: Reported on 3/11/2019) 6 Tab 0   • nitroglycerin (NITROSTAT) 0.4 MG SUBL Place 1 Tab under tongue as needed for Chest Pain. 25 Tab 11     No facility-administered encounter medications on file as of 3/11/2019.      Review of Systems   Constitutional: Negative.  Negative for chills, fever and malaise/fatigue.   HENT: Negative.  Negative for sore throat.    Eyes: Negative.    Respiratory: Negative.  Negative for cough, hemoptysis, sputum production, shortness of breath, wheezing and stridor.    Cardiovascular: Negative.  Negative for chest pain, palpitations, orthopnea, claudication, leg swelling and PND.   Gastrointestinal: Negative.    Genitourinary: Negative.    Musculoskeletal: Negative.    Skin: Negative.    Neurological: Negative.  Negative for dizziness, loss of consciousness and weakness.   Endo/Heme/Allergies: Negative.  Does not bruise/bleed easily.   All other systems reviewed and are negative.       Objective:   /84 (BP Location: Left arm, Patient Position: Sitting, BP Cuff Size: Adult)   Pulse 80   Ht 1.575 m (5' 2\")   Wt 52.2 kg " (115 lb)   SpO2 97%   BMI 21.03 kg/m²      Physical Exam   Constitutional: She appears well-developed and well-nourished. No distress.   HENT:   Head: Normocephalic and atraumatic.   Right Ear: External ear normal.   Left Ear: External ear normal.   Nose: Nose normal.   Mouth/Throat: No oropharyngeal exudate.   Eyes: Pupils are equal, round, and reactive to light. Conjunctivae and EOM are normal. Right eye exhibits no discharge. Left eye exhibits no discharge. No scleral icterus.   Neck: Neck supple. No JVD present.   Cardiovascular: Normal rate, regular rhythm and intact distal pulses.  Exam reveals no gallop and no friction rub.    No murmur heard.  Pulmonary/Chest: Effort normal. No stridor. No respiratory distress. She has no wheezes. She has no rales. She exhibits no tenderness.   Abdominal: Soft. She exhibits no distension. There is no guarding.   Musculoskeletal: Normal range of motion. She exhibits no edema, tenderness or deformity.   Neurological: She is alert. She has normal reflexes. She displays normal reflexes. No cranial nerve deficit. She exhibits normal muscle tone. Coordination normal.   Skin: Skin is warm and dry. No rash noted. She is not diaphoretic. No erythema. No pallor.   Psychiatric: She has a normal mood and affect. Her behavior is normal. Judgment and thought content normal.   Nursing note and vitals reviewed.      Assessment:     1. Anemia, unspecified type     2. Coronary artery disease involving native coronary artery of native heart without angina pectoris  EC-ECHOCARDIOGRAM COMPLETE W/O CONT   3. Cardiac pacemaker in situ  EC-ECHOCARDIOGRAM COMPLETE W/O CONT   4. Chronic kidney disease (CKD), stage III (moderate) ([facility])     5. Dilated cardiomyopathy ([facility])     6. Essential hypertension     7. History of MI (myocardial infarction)  EC-ECHOCARDIOGRAM COMPLETE W/O CONT   8. SVT (supraventricular tachycardia) ([facility])     9. Nonrheumatic aortic valve stenosis  EC-ECHOCARDIOGRAM COMPLETE W/O  CONT   10. Mitral valve insufficiency, unspecified etiology         Medical Decision Making:  Today's Assessment / Status / Plan:     84-year-old female with at least moderate aortic stenosis hypertension and CKD.  She is doing well with no changes in her clinical status.  We will repeat her echocardiogram.  I will see her back in 6 months.    Thank for you allowing me to take part in your patient's care, please call should you have any questions or would like to discuss this patient.      Nick Mercer M.D.  [address] [place] [id number]  VIA In Basket

## 2019-03-27 ENCOUNTER — HOSPITAL ENCOUNTER (OUTPATIENT)
Dept: CARDIOLOGY | Facility: MEDICAL CENTER | Age: 84
End: 2019-03-27
Attending: INTERNAL MEDICINE
Payer: MEDICARE

## 2019-03-27 DIAGNOSIS — I25.10 CORONARY ARTERY DISEASE INVOLVING NATIVE CORONARY ARTERY OF NATIVE HEART WITHOUT ANGINA PECTORIS: Chronic | ICD-10-CM

## 2019-03-27 DIAGNOSIS — I25.2 HISTORY OF MI (MYOCARDIAL INFARCTION): ICD-10-CM

## 2019-03-27 DIAGNOSIS — I35.0 NONRHEUMATIC AORTIC VALVE STENOSIS: ICD-10-CM

## 2019-03-27 DIAGNOSIS — Z95.0 CARDIAC PACEMAKER IN SITU: ICD-10-CM

## 2019-03-27 PROCEDURE — 93306 TTE W/DOPPLER COMPLETE: CPT

## 2019-03-28 LAB
LV EJECT FRACT  99904: 65
LV EJECT FRACT MOD 2C 99903: 49.46
LV EJECT FRACT MOD 4C 99902: 64.51
LV EJECT FRACT MOD BP 99901: 57.39

## 2019-03-28 PROCEDURE — 93306 TTE W/DOPPLER COMPLETE: CPT | Mod: 26 | Performed by: INTERNAL MEDICINE

## 2019-06-17 DIAGNOSIS — I25.10 CORONARY ARTERY DISEASE INVOLVING NATIVE CORONARY ARTERY OF NATIVE HEART WITHOUT ANGINA PECTORIS: ICD-10-CM

## 2019-06-18 RX ORDER — LISINOPRIL 10 MG/1
TABLET ORAL
Qty: 90 TAB | Refills: 2 | Status: SHIPPED | OUTPATIENT
Start: 2019-06-18 | End: 2020-01-07 | Stop reason: SDUPTHER

## 2019-07-03 ENCOUNTER — TELEPHONE (OUTPATIENT)
Dept: MEDICAL GROUP | Facility: MEDICAL CENTER | Age: 84
End: 2019-07-03

## 2019-07-03 NOTE — TELEPHONE ENCOUNTER
1. Caller Name: Evaristo                     Call Back Number: [phone]    2. Message: patient will be going to a picnic tomorrow and one of the people that will be there has the shingles, they wanted to speak with you regarding there concerns with this and if she should go     3. Patient approves office to leave a detailed voicemail/MyChart message: yes

## 2019-07-04 NOTE — TELEPHONE ENCOUNTER
Patient will be attending a picnic with someone who has shingles and they wanted to know whether they should not go.  We discussed shingles and its transmission and the fact that a person could get chickenpox but not shingles from somebody else.  She thinks she has had chickenpox twice before.  She has not yet had the shingles vaccines.  We discussed the fact that she would not catch shingles from someone but chickenpox from a person that had active shingles.

## 2019-07-16 DIAGNOSIS — R60.9 EDEMA, UNSPECIFIED TYPE: ICD-10-CM

## 2019-07-16 DIAGNOSIS — I50.22 CHRONIC SYSTOLIC CONGESTIVE HEART FAILURE (HCC): ICD-10-CM

## 2019-07-16 DIAGNOSIS — I25.10 CORONARY ARTERY DISEASE INVOLVING NATIVE CORONARY ARTERY OF NATIVE HEART WITHOUT ANGINA PECTORIS: ICD-10-CM

## 2019-07-17 RX ORDER — FUROSEMIDE 20 MG/1
TABLET ORAL
Qty: 90 TAB | Refills: 3 | Status: SHIPPED | OUTPATIENT
Start: 2019-07-17 | End: 2020-01-07 | Stop reason: SDUPTHER

## 2019-07-17 RX ORDER — CARVEDILOL 12.5 MG/1
TABLET ORAL
Qty: 180 TAB | Refills: 3 | Status: SHIPPED | OUTPATIENT
Start: 2019-07-17 | End: 2020-01-07 | Stop reason: SDUPTHER

## 2019-07-17 RX ORDER — POTASSIUM CHLORIDE 750 MG/1
TABLET, FILM COATED, EXTENDED RELEASE ORAL
Qty: 90 TAB | Refills: 3 | Status: SHIPPED | OUTPATIENT
Start: 2019-07-17 | End: 2020-01-07 | Stop reason: SDUPTHER

## 2019-07-29 NOTE — TELEPHONE ENCOUNTER
estropipate (OGEN) 0.75 MG Tab [[id number]]    Patient also wondering if she can get a small dose of this sent to the [facility] pharmacy on [address] until she receives her medication from [facility]

## 2019-07-30 ENCOUNTER — TELEPHONE (OUTPATIENT)
Dept: MEDICAL GROUP | Facility: MEDICAL CENTER | Age: 84
End: 2019-07-30

## 2019-07-30 NOTE — TELEPHONE ENCOUNTER
[facility] says this prescription has been discontinued by the  and is no longer available. May you please provide an alternative for: estropipate (OGEN) 0.75 MG Tab

## 2019-07-30 NOTE — TELEPHONE ENCOUNTER
Patient was informed that the new prescription was sent in.  We took this occasion again to review the dangers of taking estrogen especially in her age.  She seems to understand.

## 2019-08-05 NOTE — TELEPHONE ENCOUNTER
1. Caller Name: [facility]                                         Call Back Number: [phone]        Patient approves a detailed voicemail message: yes    Pharmacy called in stated estropipate (OGEN) 0.75 MG Tab has been discontinued by the manufacture and that they need a replacement prescription to be sent.     Please advise

## 2019-09-10 ENCOUNTER — NON-PROVIDER VISIT (OUTPATIENT)
Dept: CARDIOLOGY | Facility: MEDICAL CENTER | Age: 84
End: 2019-09-10
Payer: MEDICARE

## 2019-09-10 ENCOUNTER — OFFICE VISIT (OUTPATIENT)
Dept: CARDIOLOGY | Facility: MEDICAL CENTER | Age: 84
End: 2019-09-10
Payer: MEDICARE

## 2019-09-10 VITALS
HEIGHT: 62 IN | OXYGEN SATURATION: 95 % | SYSTOLIC BLOOD PRESSURE: 152 MMHG | DIASTOLIC BLOOD PRESSURE: 96 MMHG | WEIGHT: 121 LBS | HEART RATE: 94 BPM | BODY MASS INDEX: 22.26 KG/M2

## 2019-09-10 DIAGNOSIS — I25.2 HISTORY OF MI (MYOCARDIAL INFARCTION): ICD-10-CM

## 2019-09-10 DIAGNOSIS — I25.10 CORONARY ARTERY DISEASE INVOLVING NATIVE CORONARY ARTERY OF NATIVE HEART WITHOUT ANGINA PECTORIS: Chronic | ICD-10-CM

## 2019-09-10 DIAGNOSIS — M05.79 RHEUMATOID ARTHRITIS INVOLVING MULTIPLE SITES WITH POSITIVE RHEUMATOID FACTOR (HCC): ICD-10-CM

## 2019-09-10 DIAGNOSIS — I34.0 MITRAL VALVE INSUFFICIENCY, UNSPECIFIED ETIOLOGY: ICD-10-CM

## 2019-09-10 DIAGNOSIS — Z95.0 CARDIAC PACEMAKER IN SITU: ICD-10-CM

## 2019-09-10 DIAGNOSIS — E87.1 HYPONATREMIA: ICD-10-CM

## 2019-09-10 DIAGNOSIS — E87.6 HYPOKALEMIA: ICD-10-CM

## 2019-09-10 DIAGNOSIS — I35.0 NONRHEUMATIC AORTIC VALVE STENOSIS: ICD-10-CM

## 2019-09-10 DIAGNOSIS — N18.30 CHRONIC KIDNEY DISEASE (CKD), STAGE III (MODERATE) (HCC): ICD-10-CM

## 2019-09-10 DIAGNOSIS — I42.0 DILATED CARDIOMYOPATHY (HCC): ICD-10-CM

## 2019-09-10 DIAGNOSIS — I47.10 SVT (SUPRAVENTRICULAR TACHYCARDIA): ICD-10-CM

## 2019-09-10 DIAGNOSIS — I10 ESSENTIAL HYPERTENSION: Chronic | ICD-10-CM

## 2019-09-10 DIAGNOSIS — Z79.899 HIGH RISK MEDICATION USE: ICD-10-CM

## 2019-09-10 PROCEDURE — 93280 PM DEVICE PROGR EVAL DUAL: CPT | Performed by: INTERNAL MEDICINE

## 2019-09-10 PROCEDURE — 99215 OFFICE O/P EST HI 40 MIN: CPT | Mod: 25 | Performed by: INTERNAL MEDICINE

## 2019-09-10 RX ORDER — EPINASTINE HCL 0.05 %
DROPS OPHTHALMIC (EYE)
Refills: 3 | COMMUNITY
Start: 2019-08-01 | End: 2019-09-10

## 2019-09-10 ASSESSMENT — ENCOUNTER SYMPTOMS
DIZZINESS: 0
LOSS OF CONSCIOUSNESS: 0
SORE THROAT: 0
CARDIOVASCULAR NEGATIVE: 1
RESPIRATORY NEGATIVE: 1
COUGH: 0
EYES NEGATIVE: 1
NEUROLOGICAL NEGATIVE: 1
FEVER: 0
SPUTUM PRODUCTION: 0
CONSTITUTIONAL NEGATIVE: 1
MUSCULOSKELETAL NEGATIVE: 1
STRIDOR: 0
CHILLS: 0
PALPITATIONS: 0
SHORTNESS OF BREATH: 0
ORTHOPNEA: 0
WEAKNESS: 0
CLAUDICATION: 0
GASTROINTESTINAL NEGATIVE: 1
PND: 0
HEMOPTYSIS: 0
BRUISES/BLEEDS EASILY: 0
WHEEZING: 0

## 2019-09-10 NOTE — PROGRESS NOTES
Chief Complaint   Patient presents with   • Bradycardia     F/V: 6 MO       Subjective:   Shira Colón is a 84 y.o. female who presents today as a follow-up for her lower extremity edema CAD anemia rheumatoid arthritis high blood pressure and aortic stenosis.  Her last echocardiogram was read as mild aortic stenosis however personally reviewed it which appears to show aortic valve area of 0.7 with low flow low gradient.  Visually appears severely stenotic.  She is been not been having any chest pain palpitations PND or lower extremity edema.  She is been busy actively taking ditches and doing outdoor work with no issues.  Her blood sugars have been a little bit on the high side.  She is been compliant with her medications.    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 Ang.   • RECOVERY  5/19/2016    Procedure: CATH LAB [facility] WITH A SHOT OF THE AORTIC ROOT DR. NAYAK;  Surgeon: Recoveryonly Surgery;  [place] [facility] [place]  Service:    • RECOVERY  4/27/2016    Procedure: CATH LAB MELVIN WITH ANESTHESIA ;  Surgeon: Recoveryonly Surgery;  Location[place]  [facility] [place];  Service:    • RECOVERY  11/4/2014    Performed by Cath-Recovery Surgery at SURGERY SAME DAY [facility] ORS   • GASTROSCOPY WITH BIOPSY  5/7/2014    Performed by Darin Barros M.D. at SURGERY [facility] ORS   • COLONOSCOPY WITH CLIPPING  5/7/2014    Performed by Darin Barros M.D. at SURGERY [facility] ORS   • BREAST BIOPSY     • HYSTERECTOMY, VAGINAL     • RECTOCELE REPAIR     • TONSILLECTOMY       Family History   Problem Relation Age of Onset   • Heart Disease Brother      Social History     Socioeconomic History   • Marital status:      Spouse name: Not on file   • Number of children: Not on file   • Years of education: Not on file   • Highest education level: Not on file   Occupational History   • Not on file   Social Needs   • Financial resource strain: Not on file   • Food insecurity:     Worry: Not on file     Inability: Not on file   • Transportation needs:     Medical: Not on file     Non-medical: Not on file   Tobacco Use   • Smoking status: Never Smoker   • Smokeless tobacco: Never Used   Substance and Sexual Activity   • Alcohol use: Yes     Comment: Occasionally   • Drug use: No   • Sexual activity: Never   Lifestyle   • Physical activity:     Days per week: Not on file     Minutes per session: Not on file   • Stress: Not on file   Relationships   • Social connections:     Talks on phone: Not on file     Gets together: Not on file     Attends Yarsanism service: Not on file     Active member of club or organization: Not on file     Attends meetings of clubs or organizations: Not on file     Relationship status: Not on file   • Intimate partner violence:     Fear of current or ex partner: Not on file     Emotionally abused: Not on file     Physically abused: Not on file     Forced sexual activity: Not on file   Other Topics Concern   • Not on file   Social History Narrative   • Not on file     Allergies   Allergen Reactions   • Methotrexate      Pt. States it effects her liver.  "  • Remicade [Infliximab Injection]      Shaking/hot-flashes   • Sulfites Diarrhea   • Other Environmental Runny Nose and Itching     Plants; \"everything\"     Outpatient Encounter Medications as of 9/10/2019   Medication Sig Dispense Refill   • estrogens, conjugated (PREMARIN) 0.3 MG Tab Take 1 Tab by mouth every day. 90 Tab 0   • estropipate (OGEN) 0.75 MG Tab Take 1 Tab by mouth every day. 15 Tab 0   • potassium chloride ER (KLOR-CON) 10 MEQ tablet TAKE 1 TABLET BY MOUTH  EVERY DAY 90 Tab 3   • carvedilol (COREG) 12.5 MG Tab TAKE 1 TABLET BY MOUTH TWO  TIMES DAILY WITH MEALS 180 Tab 3   • furosemide (LASIX) 20 MG Tab TAKE 1 TABLET BY MOUTH  DAILY 90 Tab 3   • lisinopril (PRINIVIL) 10 MG Tab TAKE 1 TABLET BY MOUTH  EVERY DAY 90 Tab 2   • omeprazole (PRILOSEC) 20 MG delayed-release capsule TAKE 1 CAPSULE BY MOUTH  EVERY MORNING ON AN EMPTY  STOMACH 90 Cap 3   • allopurinol (ZYLOPRIM) 100 MG Tab Take 100 mg by mouth every day.     • NON SPECIFIED      • DUREZOL 0.05 % Emulsion INSTILL 1 GTT INTO OD QID  1   • CycloSPORINE (RESTASIS OP) 1 Drop by Ophthalmic route 2 Times a Day. In each eye      • Lifitegrast (XIIDRA) 5 % Solution by Ophthalmic route.     • alendronate (FOSAMAX) 70 MG Tab Take 70 mg by mouth every 7 days. On Mon     • tocilizumab (ACTEMRA) 400 MG/20ML Solution 400 mg by Intravenous route Q30 DAYS.     • Polyvinyl Alcohol-Povidone (REFRESH OP) 2 Drops by Ophthalmic route 2 Times a Day.     • nitroglycerin (NITROSTAT) 0.4 MG SUBL Place 1 Tab under tongue as needed for Chest Pain. 25 Tab 11   • Calcium Carbonate-Vitamin D (CALTRATE 600+D PO) Take 1 Tab by mouth 2 Times a Day.     • Multiple Vitamins-Minerals (CENTRUM SILVER PO) Take 1 Tab by mouth every day.     • [DISCONTINUED] Epinastine HCl 0.05 % Solution PLACE 1 GTT IN OU BID  3     No facility-administered encounter medications on file as of 9/10/2019.      Review of Systems   Constitutional: Negative.  Negative for chills, fever and " "malaise/fatigue.   HENT: Negative.  Negative for sore throat.    Eyes: Negative.    Respiratory: Negative.  Negative for cough, hemoptysis, sputum production, shortness of breath, wheezing and stridor.    Cardiovascular: Negative.  Negative for chest pain, palpitations, orthopnea, claudication, leg swelling and PND.   Gastrointestinal: Negative.    Genitourinary: Negative.    Musculoskeletal: Negative.    Skin: Negative.    Neurological: Negative.  Negative for dizziness, loss of consciousness and weakness.   Endo/Heme/Allergies: Negative.  Does not bruise/bleed easily.   All other systems reviewed and are negative.       Objective:   /96 (BP Location: Left arm, Patient Position: Sitting, BP Cuff Size: Adult)   Pulse 94   Ht 1.575 m (5' 2\")   Wt 54.9 kg (121 lb)   SpO2 95%   BMI 22.13 kg/m²     Physical Exam   Constitutional: She appears well-developed and well-nourished. No distress.   HENT:   Head: Normocephalic and atraumatic.   Right Ear: External ear normal.   Left Ear: External ear normal.   Nose: Nose normal.   Mouth/Throat: No oropharyngeal exudate.   Eyes: Pupils are equal, round, and reactive to light. Conjunctivae and EOM are normal. Right eye exhibits no discharge. Left eye exhibits no discharge. No scleral icterus.   Neck: Neck supple. No JVD present.   Cardiovascular: Normal rate, regular rhythm and intact distal pulses. Exam reveals no gallop and no friction rub.   Murmur heard.  Harsh sounding murmur, late peaking, absent A2   Pulmonary/Chest: Effort normal. No stridor. No respiratory distress. She has no wheezes. She has no rales. She exhibits no tenderness.   Abdominal: Soft. She exhibits no distension. There is no guarding.   Musculoskeletal: Normal range of motion. She exhibits no edema, tenderness or deformity.   Neurological: She is alert. She has normal reflexes. She displays normal reflexes. No cranial nerve deficit. She exhibits normal muscle tone. Coordination normal.   Skin: " Skin is warm and dry. No rash noted. She is not diaphoretic. No erythema. No pallor.   Psychiatric: She has a normal mood and affect. Her behavior is normal. Judgment and thought content normal.   Nursing note and vitals reviewed.      Assessment:     1. Cardiac pacemaker in situ     2. Coronary artery disease involving native coronary artery of native heart without angina pectoris  EC-ECHOCARDIOGRAM COMPLETE W/O CONT   3. Chronic kidney disease (CKD), stage III (moderate) ([facility])  proBrain Natriuretic Peptide, NT   4. Dilated cardiomyopathy (HCC)  proBrain Natriuretic Peptide, NT   5. Essential hypertension     6. History of MI (myocardial infarction)     7. Hypokalemia  Basic Metabolic Panel   8. Hyponatremia  Basic Metabolic Panel   9. Mitral valve insufficiency, unspecified etiology  proBrain Natriuretic Peptide, NT    Basic Metabolic Panel   10. Nonrheumatic aortic valve stenosis  EC-ECHOCARDIOGRAM COMPLETE W/O CONT   11. SVT (supraventricular tachycardia) ([facility])     12. Rheumatoid arthritis involving multiple sites with positive rheumatoid factor ([facility])     13. High risk medication use  EC-ECHOCARDIOGRAM COMPLETE W/O CONT       Medical Decision Making:  Today's Assessment / Status / Plan:     84-year-old female with CAD status post stent recent pacemaker check heart failure and likely severe aortic stenosis in the asymptomatic stage.  For her numerous medical issues we will check a basic metabolic panel today.  I will check a BNP given her high risk medication usage as well as her aortic valve.  We will repeat an echocardiogram.  If it continues show low flow low gradient aortic stenosis we will get a treadmill stress test to assess functional capacity.  I will otherwise see her back in 6 months.    Thank for you allowing me to take part in your patient's care, please call should you have any questions or would like to discuss this patient.

## 2019-09-23 ENCOUNTER — HOSPITAL ENCOUNTER (OUTPATIENT)
Dept: LAB | Facility: MEDICAL CENTER | Age: 84
End: 2019-09-23
Attending: INTERNAL MEDICINE
Payer: MEDICARE

## 2019-09-23 DIAGNOSIS — I34.0 MITRAL VALVE INSUFFICIENCY, UNSPECIFIED ETIOLOGY: ICD-10-CM

## 2019-09-23 DIAGNOSIS — E87.6 HYPOKALEMIA: ICD-10-CM

## 2019-09-23 DIAGNOSIS — I42.0 DILATED CARDIOMYOPATHY (HCC): ICD-10-CM

## 2019-09-23 DIAGNOSIS — E87.1 HYPONATREMIA: ICD-10-CM

## 2019-09-23 DIAGNOSIS — N18.30 CHRONIC KIDNEY DISEASE (CKD), STAGE III (MODERATE) (HCC): ICD-10-CM

## 2019-09-23 LAB
ANION GAP SERPL CALC-SCNC: 11 MMOL/L (ref 0–11.9)
BUN SERPL-MCNC: 26 MG/DL (ref 8–22)
CALCIUM SERPL-MCNC: 9.1 MG/DL (ref 8.5–10.5)
CHLORIDE SERPL-SCNC: 107 MMOL/L (ref 96–112)
CO2 SERPL-SCNC: 23 MMOL/L (ref 20–33)
CREAT SERPL-MCNC: 1.07 MG/DL (ref 0.5–1.4)
FASTING STATUS PATIENT QL REPORTED: NORMAL
GLUCOSE SERPL-MCNC: 103 MG/DL (ref 65–99)
NT-PROBNP SERPL IA-MCNC: 742 PG/ML (ref 0–125)
POTASSIUM SERPL-SCNC: 3.7 MMOL/L (ref 3.6–5.5)
SODIUM SERPL-SCNC: 141 MMOL/L (ref 135–145)

## 2019-09-23 PROCEDURE — 36415 COLL VENOUS BLD VENIPUNCTURE: CPT

## 2019-09-23 PROCEDURE — 83880 ASSAY OF NATRIURETIC PEPTIDE: CPT

## 2019-09-23 PROCEDURE — 80048 BASIC METABOLIC PNL TOTAL CA: CPT

## 2019-09-25 ENCOUNTER — HOSPITAL ENCOUNTER (OUTPATIENT)
Dept: CARDIOLOGY | Facility: MEDICAL CENTER | Age: 84
End: 2019-09-25
Attending: INTERNAL MEDICINE
Payer: MEDICARE

## 2019-09-25 DIAGNOSIS — I25.10 CORONARY ARTERY DISEASE INVOLVING NATIVE CORONARY ARTERY OF NATIVE HEART WITHOUT ANGINA PECTORIS: Chronic | ICD-10-CM

## 2019-09-25 DIAGNOSIS — Z79.899 HIGH RISK MEDICATION USE: ICD-10-CM

## 2019-09-25 DIAGNOSIS — I35.0 NONRHEUMATIC AORTIC VALVE STENOSIS: ICD-10-CM

## 2019-09-25 DIAGNOSIS — I25.119 CORONARY ARTERY DISEASE WITH ANGINA PECTORIS, UNSPECIFIED VESSEL OR LESION TYPE, UNSPECIFIED WHETHER NATIVE OR TRANSPLANTED HEART (HCC): ICD-10-CM

## 2019-09-25 LAB
LV EJECT FRACT  99904: 70
LV EJECT FRACT MOD 2C 99903: 61.4
LV EJECT FRACT MOD 4C 99902: 57.54
LV EJECT FRACT MOD BP 99901: 59.59

## 2019-09-25 PROCEDURE — 93306 TTE W/DOPPLER COMPLETE: CPT | Mod: 26 | Performed by: INTERNAL MEDICINE

## 2019-09-25 PROCEDURE — 93306 TTE W/DOPPLER COMPLETE: CPT

## 2020-01-07 DIAGNOSIS — R60.9 EDEMA, UNSPECIFIED TYPE: ICD-10-CM

## 2020-01-07 DIAGNOSIS — I25.10 CORONARY ARTERY DISEASE INVOLVING NATIVE CORONARY ARTERY OF NATIVE HEART WITHOUT ANGINA PECTORIS: ICD-10-CM

## 2020-01-07 DIAGNOSIS — I50.22 CHRONIC SYSTOLIC CONGESTIVE HEART FAILURE (HCC): ICD-10-CM

## 2020-01-08 RX ORDER — FUROSEMIDE 20 MG/1
20 TABLET ORAL DAILY
Qty: 90 TAB | Refills: 2 | Status: SHIPPED | OUTPATIENT
Start: 2020-01-08 | End: 2020-09-15

## 2020-01-08 RX ORDER — CARVEDILOL 12.5 MG/1
12.5 TABLET ORAL 2 TIMES DAILY WITH MEALS
Qty: 180 TAB | Refills: 2 | Status: SHIPPED | OUTPATIENT
Start: 2020-01-08 | End: 2020-09-15

## 2020-01-08 RX ORDER — POTASSIUM CHLORIDE 750 MG/1
10 TABLET, FILM COATED, EXTENDED RELEASE ORAL
Qty: 90 TAB | Refills: 3 | Status: SHIPPED | OUTPATIENT
Start: 2020-01-08 | End: 2020-12-14

## 2020-01-08 RX ORDER — LISINOPRIL 10 MG/1
10 TABLET ORAL
Qty: 90 TAB | Refills: 2 | Status: SHIPPED | OUTPATIENT
Start: 2020-01-08 | End: 2020-09-15

## 2020-02-14 ENCOUNTER — HOSPITAL ENCOUNTER (OUTPATIENT)
Dept: RADIOLOGY | Facility: MEDICAL CENTER | Age: 85
End: 2020-02-14
Attending: PHYSICIAN ASSISTANT
Payer: MEDICARE

## 2020-02-14 ENCOUNTER — OFFICE VISIT (OUTPATIENT)
Dept: URGENT CARE | Facility: PHYSICIAN GROUP | Age: 85
End: 2020-02-14
Payer: MEDICARE

## 2020-02-14 VITALS
SYSTOLIC BLOOD PRESSURE: 144 MMHG | DIASTOLIC BLOOD PRESSURE: 84 MMHG | HEART RATE: 88 BPM | RESPIRATION RATE: 16 BRPM | TEMPERATURE: 98.6 F | BODY MASS INDEX: 22.82 KG/M2 | OXYGEN SATURATION: 94 % | WEIGHT: 124 LBS | HEIGHT: 62 IN

## 2020-02-14 DIAGNOSIS — J40 BRONCHITIS: ICD-10-CM

## 2020-02-14 DIAGNOSIS — R05.8 PRODUCTIVE COUGH: ICD-10-CM

## 2020-02-14 PROCEDURE — 69210 REMOVE IMPACTED EAR WAX UNI: CPT | Performed by: PHYSICIAN ASSISTANT

## 2020-02-14 PROCEDURE — 99214 OFFICE O/P EST MOD 30 MIN: CPT | Mod: 25 | Performed by: PHYSICIAN ASSISTANT

## 2020-02-14 PROCEDURE — 71046 X-RAY EXAM CHEST 2 VIEWS: CPT

## 2020-02-14 RX ORDER — CODEINE PHOSPHATE AND GUAIFENESIN 10; 100 MG/5ML; MG/5ML
5 SOLUTION ORAL EVERY 4 HOURS PRN
COMMUNITY
End: 2020-02-20 | Stop reason: SDUPTHER

## 2020-02-14 RX ORDER — OMEPRAZOLE 20 MG/1
20 CAPSULE, DELAYED RELEASE ORAL DAILY
COMMUNITY

## 2020-02-14 RX ORDER — CYCLOSPORINE 0.5 MG/ML
EMULSION OPHTHALMIC
COMMUNITY
Start: 2019-12-21 | End: 2021-01-01

## 2020-02-14 RX ORDER — BENZONATATE 100 MG/1
100 CAPSULE ORAL 3 TIMES DAILY PRN
Qty: 30 CAP | Refills: 0 | Status: ON HOLD | OUTPATIENT
Start: 2020-02-14 | End: 2020-06-12

## 2020-02-14 RX ORDER — DOXYCYCLINE HYCLATE 100 MG
100 TABLET ORAL 2 TIMES DAILY
Qty: 14 TAB | Refills: 0 | Status: SHIPPED | OUTPATIENT
Start: 2020-02-14 | End: 2020-02-21

## 2020-02-14 RX ORDER — PREDNISONE 10 MG/1
TABLET ORAL
COMMUNITY
Start: 2019-12-20 | End: 2021-01-01

## 2020-02-14 ASSESSMENT — ENCOUNTER SYMPTOMS
WHEEZING: 0
FEVER: 0
EYE REDNESS: 0
EYE DISCHARGE: 0
ABDOMINAL PAIN: 0
MYALGIAS: 0
SORE THROAT: 0
NAUSEA: 0
COUGH: 1
SHORTNESS OF BREATH: 0
HEADACHES: 0
SPUTUM PRODUCTION: 1
CHILLS: 0
VOMITING: 0
DIARRHEA: 0
WEIGHT LOSS: 0
DIZZINESS: 0

## 2020-02-14 ASSESSMENT — COPD QUESTIONNAIRES: COPD: 0

## 2020-02-19 ENCOUNTER — OFFICE VISIT (OUTPATIENT)
Dept: MEDICAL GROUP | Facility: MEDICAL CENTER | Age: 85
End: 2020-02-19
Payer: MEDICARE

## 2020-02-19 VITALS
DIASTOLIC BLOOD PRESSURE: 72 MMHG | TEMPERATURE: 98.5 F | BODY MASS INDEX: 22.45 KG/M2 | WEIGHT: 122 LBS | SYSTOLIC BLOOD PRESSURE: 130 MMHG | HEIGHT: 62 IN | HEART RATE: 88 BPM | OXYGEN SATURATION: 93 %

## 2020-02-19 DIAGNOSIS — N18.30 CHRONIC KIDNEY DISEASE (CKD), STAGE III (MODERATE): ICD-10-CM

## 2020-02-19 DIAGNOSIS — I10 ESSENTIAL HYPERTENSION: ICD-10-CM

## 2020-02-19 DIAGNOSIS — H04.123 BILATERAL DRY EYES: ICD-10-CM

## 2020-02-19 DIAGNOSIS — I35.0 NONRHEUMATIC AORTIC VALVE STENOSIS: ICD-10-CM

## 2020-02-19 DIAGNOSIS — R05.9 COUGH: ICD-10-CM

## 2020-02-19 PROCEDURE — 99214 OFFICE O/P EST MOD 30 MIN: CPT | Performed by: INTERNAL MEDICINE

## 2020-02-19 ASSESSMENT — PATIENT HEALTH QUESTIONNAIRE - PHQ9: CLINICAL INTERPRETATION OF PHQ2 SCORE: 0

## 2020-02-20 DIAGNOSIS — R05.9 COUGH: ICD-10-CM

## 2020-02-20 RX ORDER — CODEINE PHOSPHATE AND GUAIFENESIN 10; 100 MG/5ML; MG/5ML
5 SOLUTION ORAL EVERY 4 HOURS PRN
Qty: 120 ML | Refills: 0 | Status: SHIPPED | OUTPATIENT
Start: 2020-02-20 | End: 2020-02-26

## 2020-02-20 NOTE — ASSESSMENT & PLAN NOTE
She has hypertension that is fairly well controlled with carvedilol and lisinopril.  She denies significant lightheadedness.

## 2020-02-20 NOTE — PROGRESS NOTES
Subjective:     Chief Complaint   Patient presents with   • Bronchitis   • Shortness of Breath     Shira Colón is a 85 y.o. female here today for follow-up and further evaluation of a persistent cough that she has had ever since a prior upper respiratory infection.  She is accompanied by her son.    Essential hypertension  She has hypertension that is fairly well controlled with carvedilol and lisinopril.  She denies significant lightheadedness.    Nonrheumatic aortic valve stenosis  She has aortic stenosis followed by Dr. Tatum.    Chronic kidney disease (CKD), stage III (moderate)  She has chronic renal insufficiency with most recent GFR 49.  She tries to stay well-hydrated.    Bilateral dry eyes  She continues to complain of bilateral dry eyes.  She is on Restasis as well as other drops.    Cough  She had an upper respiratory infection she reports about 2 months ago.  She is had a persistent cough since.  She was seen at urgent care 5 days ago.  Chest x-ray revealed no significant abnormality.  She was placed on doxycycline.  She was started on steroids.  She reports that she now is gradually improving.  The cough is improving quite a bit.  She reports her dyspnea is much less.  There is no wheezing.  She originally had some rattling sounds in her chest but those seem to have resolved also.  She denies recent shaking chills or fevers.  Her son also reports that she seems to be improving.  She does still have a cough that bothers her at night.  She is using Tessalon Perles and is not using the codeine cough medication that she has at home.  She has had problems with dyspnea but that is resolving.       Diagnoses of Essential hypertension, Nonrheumatic aortic valve stenosis, Chronic kidney disease (CKD), stage III (moderate) ([facility]), Bilateral dry eyes, and Cough were pertinent to this visit.    Allergies: Methotrexate; Remicade [infliximab injection]; Sulfites; and Other environmental  Current medicines  (including changes today)  Current Outpatient Medications   Medication Sig Dispense Refill   • omeprazole (PRILOSEC) 20 MG delayed-release capsule Take 20 mg by mouth every day.     • guaifenesin-codeine (ROBITUSSIN AC) Solution oral solution Take 5 mL by mouth every four hours as needed for Cough.     • predniSONE (DELTASONE) 10 MG Tab      • RESTASIS 0.05 % ophthalmic emulsion INT 1 GTT IN OU BID     • doxycycline (VIBRAMYCIN) 100 MG Tab Take 1 Tab by mouth 2 times a day for 7 days. 14 Tab 0   • benzonatate (TESSALON) 100 MG Cap Take 1 Cap by mouth 3 times a day as needed for Cough. 30 Cap 0   • carvedilol (COREG) 12.5 MG Tab Take 1 Tab by mouth 2 times a day, with meals. 180 Tab 2   • furosemide (LASIX) 20 MG Tab Take 1 Tab by mouth every day. 90 Tab 2   • lisinopril (PRINIVIL) 10 MG Tab Take 1 Tab by mouth every day. 90 Tab 2   • potassium chloride ER (KLOR-CON) 10 MEQ tablet Take 1 Tab by mouth every day. 90 Tab 3   • PREMARIN 0.3 MG Tab TAKE 1 TABLET BY MOUTH  EVERY DAY 90 Tab 3   • allopurinol (ZYLOPRIM) 100 MG Tab Take 100 mg by mouth every day.     • NON SPECIFIED      • DUREZOL 0.05 % Emulsion INSTILL 1 GTT INTO OD QID  1   • Lifitegrast (XIIDRA) 5 % Solution by Ophthalmic route.     • tocilizumab (ACTEMRA) 400 MG/20ML Solution 400 mg by Intravenous route Q30 DAYS.     • Polyvinyl Alcohol-Povidone (REFRESH OP) 2 Drops by Ophthalmic route 2 Times a Day.     • nitroglycerin (NITROSTAT) 0.4 MG SUBL Place 1 Tab under tongue as needed for Chest Pain. 25 Tab 11   • Calcium Carbonate-Vitamin D (CALTRATE 600+D PO) Take 1 Tab by mouth 2 Times a Day.     • Multiple Vitamins-Minerals (CENTRUM SILVER PO) Take 1 Tab by mouth every day.       No current facility-administered medications for this visit.        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), and Unspecified cataract. She also has no past medical history of Cold.    ROS    Patient denies significant change in strength, weight or appetite.  No significant lightheadedness or headaches.  No change in vision, hearing, or swallowing.  No new dyspnea, coughing, chest pain, or palpitations.  No indigestion, abdominal pain, or change in bowel habits.  No change in urinating.  No new ankle swelling.       Objective:     PE:  /72 (BP Location: Left arm, Patient Position: Sitting)   Pulse 88   Temp 36.9 °C (98.5 °F)   Ht 1.575 m (5' 2\")   Wt 55.3 kg (122 lb)   SpO2 93%   BMI 22.31 kg/m²    Neck is supple without significant lymphadenopathy or masses.  Lungs are clear with normal breath sounds without wheezes or rales .  Cardiovascular: peripheral circulation is satisfactory, heart sounds are unchanged and unremarkable with the previously noted grade 3/6 somewhat harsh systolic crescendo murmur loudest at the upper right sternal border..  Abdomen is soft, without masses or tenderness, with normal bowel sounds.  Extremities are without significant edema, cyanosis or deformity.      Assessment and Plan:   The following treatment plan was discussed  1. Essential hypertension  Basic Metabolic Panel    No medication change.  Follow low-salt diet.   2. Nonrheumatic aortic valve stenosis      Continue regular follow-up with Dr. Tatum.   3. Chronic kidney disease (CKD), stage III (moderate) ([facility])      She was encouraged to remain well-hydrated.   4. Bilateral dry eyes      She is to follow-up closely with her eye doctor.   5. Cough      Finish the doxycycline although I think this is an irritative bronchitis.  She will use Phenergan with codeine for nighttime cough and Tessalon " Perles in the day.  She will contact us if she does not continue to be gradually improving.       Followup: She will be seen in about 3 months for follow-up and more complete health evaluation or certainly contact us sooner if she does not continue to gradually improve.  She may need further cardiac follow-up.

## 2020-02-20 NOTE — ASSESSMENT & PLAN NOTE
She had an upper respiratory infection she reports about 2 months ago.  She is had a persistent cough since.  She was seen at urgent care 5 days ago.  Chest x-ray revealed no significant abnormality.  She was placed on doxycycline.  She was started on steroids.  She reports that she now is gradually improving.  The cough is improving quite a bit.  She reports her dyspnea is much less.  There is no wheezing.  She originally had some rattling sounds in her chest but those seem to have resolved also.  She denies recent shaking chills or fevers.  Her son also reports that she seems to be improving.  She does still have a cough that bothers her at night.  She is using Tessalon Perles and is not using the codeine cough medication that she has at home.

## 2020-02-26 DIAGNOSIS — R05.9 COUGH: ICD-10-CM

## 2020-03-09 RX ORDER — CODEINE PHOSPHATE AND GUAIFENESIN 10; 100 MG/5ML; MG/5ML
5 SOLUTION ORAL EVERY 4 HOURS PRN
Qty: 120 ML | Refills: 0 | OUTPATIENT
Start: 2020-03-09 | End: 2020-03-15

## 2020-05-12 ENCOUNTER — TELEPHONE (OUTPATIENT)
Dept: MEDICAL GROUP | Facility: MEDICAL CENTER | Age: 85
End: 2020-05-12

## 2020-05-12 DIAGNOSIS — I10 ESSENTIAL HYPERTENSION: ICD-10-CM

## 2020-05-14 ENCOUNTER — TELEPHONE (OUTPATIENT)
Dept: MEDICAL GROUP | Facility: MEDICAL CENTER | Age: 85
End: 2020-05-14

## 2020-05-18 ENCOUNTER — HOSPITAL ENCOUNTER (OUTPATIENT)
Dept: LAB | Facility: MEDICAL CENTER | Age: 85
End: 2020-05-18
Attending: INTERNAL MEDICINE
Payer: MEDICARE

## 2020-05-18 DIAGNOSIS — I10 ESSENTIAL HYPERTENSION: ICD-10-CM

## 2020-05-18 LAB
ANION GAP SERPL CALC-SCNC: 13 MMOL/L (ref 7–16)
BUN SERPL-MCNC: 20 MG/DL (ref 8–22)
CALCIUM SERPL-MCNC: 9.1 MG/DL (ref 8.5–10.5)
CHLORIDE SERPL-SCNC: 101 MMOL/L (ref 96–112)
CO2 SERPL-SCNC: 26 MMOL/L (ref 20–33)
CREAT SERPL-MCNC: 0.9 MG/DL (ref 0.5–1.4)
FASTING STATUS PATIENT QL REPORTED: NORMAL
GLUCOSE SERPL-MCNC: 99 MG/DL (ref 65–99)
POTASSIUM SERPL-SCNC: 4 MMOL/L (ref 3.6–5.5)
SODIUM SERPL-SCNC: 140 MMOL/L (ref 135–145)

## 2020-05-18 PROCEDURE — 36415 COLL VENOUS BLD VENIPUNCTURE: CPT

## 2020-05-18 PROCEDURE — 80048 BASIC METABOLIC PNL TOTAL CA: CPT

## 2020-05-20 ENCOUNTER — OFFICE VISIT (OUTPATIENT)
Dept: MEDICAL GROUP | Facility: MEDICAL CENTER | Age: 85
End: 2020-05-20
Payer: MEDICARE

## 2020-05-20 VITALS
SYSTOLIC BLOOD PRESSURE: 118 MMHG | HEIGHT: 62 IN | WEIGHT: 124 LBS | BODY MASS INDEX: 22.82 KG/M2 | DIASTOLIC BLOOD PRESSURE: 84 MMHG | OXYGEN SATURATION: 95 % | HEART RATE: 84 BPM

## 2020-05-20 DIAGNOSIS — Z23 NEED FOR STREPTOCOCCUS PNEUMONIAE VACCINATION: ICD-10-CM

## 2020-05-20 DIAGNOSIS — N18.30 CHRONIC KIDNEY DISEASE (CKD), STAGE III (MODERATE): ICD-10-CM

## 2020-05-20 DIAGNOSIS — M05.79 RHEUMATOID ARTHRITIS INVOLVING MULTIPLE SITES WITH POSITIVE RHEUMATOID FACTOR (HCC): ICD-10-CM

## 2020-05-20 DIAGNOSIS — I10 ESSENTIAL HYPERTENSION: Chronic | ICD-10-CM

## 2020-05-20 DIAGNOSIS — I47.10 SVT (SUPRAVENTRICULAR TACHYCARDIA) (HCC): ICD-10-CM

## 2020-05-20 DIAGNOSIS — Z95.0 CARDIAC PACEMAKER IN SITU: ICD-10-CM

## 2020-05-20 DIAGNOSIS — M81.0 AGE-RELATED OSTEOPOROSIS WITHOUT CURRENT PATHOLOGICAL FRACTURE: ICD-10-CM

## 2020-05-20 DIAGNOSIS — Z00.00 ROUTINE GENERAL MEDICAL EXAMINATION AT A HEALTH CARE FACILITY: ICD-10-CM

## 2020-05-20 PROCEDURE — 90732 PPSV23 VACC 2 YRS+ SUBQ/IM: CPT | Performed by: INTERNAL MEDICINE

## 2020-05-20 PROCEDURE — G0009 ADMIN PNEUMOCOCCAL VACCINE: HCPCS | Performed by: INTERNAL MEDICINE

## 2020-05-20 ASSESSMENT — ACTIVITIES OF DAILY LIVING (ADL): BATHING_REQUIRES_ASSISTANCE: 0

## 2020-05-20 ASSESSMENT — ENCOUNTER SYMPTOMS: GENERAL WELL-BEING: GOOD

## 2020-05-20 ASSESSMENT — PATIENT HEALTH QUESTIONNAIRE - PHQ9: CLINICAL INTERPRETATION OF PHQ2 SCORE: 0

## 2020-05-20 NOTE — PROGRESS NOTES
Chief Complaint   Patient presents with   • Annual Wellness Visit         HPI:  Shira is a 85 y.o. here for Medicare Annual Wellness Visit        Patient Active Problem List    Diagnosis Date Noted   • Dilated cardiomyopathy (HCC) 06/01/2016     Priority: High   • Nonrheumatic aortic valve stenosis 04/14/2016     Priority: High   • Mitral regurgitation 04/14/2016     Priority: Medium   • SVT (supraventricular tachycardia) ([facility]) 08/26/2014     Priority: Medium   • Essential hypertension 12/09/2015     Priority: Low   • Cough 02/19/2020   • High risk medication use 09/10/2019   • Bilateral dry eyes 05/30/2018   • Idiopathic gout of ankle 05/30/2018   • Actinic keratosis 07/21/2017   • Cardiac pacemaker in situ 12/13/2016   • Bradycardia 11/29/2016   • History of MI (myocardial infarction) 12/17/2015   • Routine general medical examination at a health care facility 12/09/2015   • Low serum vitamin D 12/09/2015   • Gastroesophageal reflux disease without esophagitis 06/03/2015   • Osteoporosis 06/03/2015   • Other specified cardiac dysrhythmias 11/04/2014   • Chest pain 10/30/2014   • CAD (coronary artery disease) 10/23/2014   • Hypokalemia 08/26/2014   • Anemia 05/07/2014   • RA (rheumatoid arthritis) ([facility]) 05/07/2014   • Chronic kidney disease (CKD), stage III (moderate) ([facility]) 05/07/2014   • Hyponatremia 04/30/2013       Current Outpatient Medications   Medication Sig Dispense Refill   • omeprazole (PRILOSEC) 20 MG delayed-release capsule Take 20 mg by mouth every day.     • predniSONE (DELTASONE) 10 MG Tab      • RESTASIS 0.05 % ophthalmic emulsion INT 1 GTT IN OU BID     • benzonatate (TESSALON) 100 MG Cap Take 1 Cap by mouth 3 times a day as needed for Cough. 30 Cap 0   • carvedilol (COREG) 12.5 MG Tab Take 1 Tab by mouth 2 times a day, with meals. 180 Tab 2   • furosemide (LASIX) 20 MG Tab Take 1 Tab by mouth every day. 90 Tab 2   • lisinopril (PRINIVIL) 10 MG Tab Take 1 Tab by mouth every day. 90 Tab 2   •  potassium chloride ER (KLOR-CON) 10 MEQ tablet Take 1 Tab by mouth every day. 90 Tab 3   • PREMARIN 0.3 MG Tab TAKE 1 TABLET BY MOUTH  EVERY DAY 90 Tab 3   • allopurinol (ZYLOPRIM) 100 MG Tab Take 100 mg by mouth every day.     • NON SPECIFIED      • Lifitegrast (XIIDRA) 5 % Solution by Ophthalmic route.     • tocilizumab (ACTEMRA) 400 MG/20ML Solution 400 mg by Intravenous route Q30 DAYS.     • Polyvinyl Alcohol-Povidone (REFRESH OP) 2 Drops by Ophthalmic route 2 Times a Day.     • nitroglycerin (NITROSTAT) 0.4 MG SUBL Place 1 Tab under tongue as needed for Chest Pain. 25 Tab 11   • Calcium Carbonate-Vitamin D (CALTRATE 600+D PO) Take 1 Tab by mouth 2 Times a Day.     • Multiple Vitamins-Minerals (CENTRUM SILVER PO) Take 1 Tab by mouth every day.     • DUREZOL 0.05 % Emulsion INSTILL 1 GTT INTO OD QID  1     No current facility-administered medications for this visit.         Patient is taking medications as noted in medication list.  Current supplements as per medication list.     Allergies: Methotrexate; Remicade [infliximab injection]; Sulfites; and Other environmental    Current social contact/activities:     Is patient current with immunizations? Yes.    She  reports that she has never smoked. She has never used smokeless tobacco. She reports current alcohol use. She reports that she does not use drugs.  Counseling given: Not Answered        DPA/Advanced directive: Patient has Advanced Directive on file.     ROS:    Gait: Uses a cane   Ostomy: No   Other tubes: No   Amputations: No   Chronic oxygen use No   Last eye exam 2020  Wears hearing aids: No   : Reports urinary leakage during the last 6 months that has somewhat interfered with their daily activities or sleep.      Screening:        Depression Screening    Little interest or pleasure in doing things?  0 - not at all  Feeling down, depressed, or hopeless? 0 - not at all  Patient Health Questionnaire Score: 0    If depressive symptoms identified  deferred to follow up visit unless specifically addressed in assessment and plan.    Interpretation of PHQ-9 Total Score   Score Severity   1-4 No Depression   5-9 Mild Depression   10-14 Moderate Depression   15-19 Moderately Severe Depression   20-27 Severe Depression    Screening for Cognitive Impairment    Three Minute Recall (village, kitchen, baby)  3/3    Pedrito clock face with all 12 numbers and set the hands to show 10 past 10.  Yes    If cognitive concerns identified, deferred for follow up unless specifically addressed in assessment and plan.    Fall Risk Assessment    Has the patient had two or more falls in the last year or any fall with injury in the last year?  No  If fall risk identified, deferred for follow up unless specifically addressed in assessment and plan.    Safety Assessment    Throw rugs on floor.  No  Handrails on all stairs.  Yes  Good lighting in all hallways.  Yes  Difficulty hearing.  No  Patient counseled about all safety risks that were identified.    Functional Assessment ADLs    Are there any barriers preventing you from cooking for yourself or meeting nutritional needs?  No.    Are there any barriers preventing you from driving safely or obtaining transportation?  No.    Are there any barriers preventing you from using a telephone or calling for help?  No.    Are there any barriers preventing you from shopping?  No.    Are there any barriers preventing you from taking care of your own finances?  No.    Are there any barriers preventing you from managing your medications?  No.    Are there any barriers preventing you from showering, bathing or dressing yourself?  No.    Are you currently engaging in any exercise or physical activity?  No.     What is your perception of your health?  Good.    Health Maintenance Summary                IMM DTaP/Tdap/Td Vaccine Overdue 11/13/1953     IMM PNEUMOCOCCAL VACCINE: 65+ Years Overdue 10/18/2016      Done 10/18/2015 Imm Admin: Pneumococcal  Conjugate Vaccine (Prevnar/PCV-13)     Patient has more history with this topic...    Annual Wellness Visit Overdue 11/30/2018      Not specified 11/29/2017      Patient has more history with this topic...    IMM ZOSTER VACCINES Overdue 1/10/2020      Done 11/15/2019 Imm Admin: Recombinant Zoster Vaccine (RZV) (Shingrix)    IMM INFLUENZA Next Due 9/1/2020      Done 10/5/2017 Imm Admin: Influenza Vaccine Adult HD     Patient has more history with this topic...    BONE DENSITY Next Due 12/17/2020      Postponed 12/17/2015 Patient will discuss scheduling test with her PCP.    COLONOSCOPY Next Due 12/17/2025      Postponed 12/17/2015 Patient states she had colonoscopy while in [place] in 2014.     Patient has more history with this topic...          Patient Care Team:  Nick Mercer M.D. as PCP - General (Internal Medicine)  Terry Robb M.D. as Consulting Physician (Rheumatology)  Terry Tatum M.D. 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 She also has no past medical history of Cold.   Past Surgical History:   Procedure Laterality Date   • PACEMAKER INSERTION  December 2016    Medtronic Adapta [id number] implanted by Dr. Ang.   • RECOVERY  5/19/2016    Procedure: CATH LAB  "[facility], [facility] WITH A SHOT OF THE AORTIC ROOT DR. NAYAK;  Surgeon: Recoveryonsherrell Surgery;  Location: SURGERY PRE-POST PROC UNIT [facility];  Service:    • RECOVERY  4/27/2016    Procedure: CATH LAB MELVIN WITH ANESTHESIA ;  Surgeon: Arabella Surgery;  Location: SURGERY PRE-POST PROC UNIT [facility];  Service:    • RECOVERY  11/4/2014    Performed by Cath-Recovery Surgery at SURGERY SAME DAY [facility] ORS   • GASTROSCOPY WITH BIOPSY  5/7/2014    Performed by Darin Barros M.D. at SURGERY [facility]   • COLONOSCOPY WITH CLIPPING  5/7/2014    Performed by Darin Barros M.D. at [facility]   • BREAST BIOPSY     • HYSTERECTOMY, VAGINAL     • RECTOCELE REPAIR     • TONSILLECTOMY             Exam:     /84 (BP Location: Left arm, Patient Position: Sitting, BP Cuff Size: Adult)   Pulse 84   Ht 1.575 m (5' 2\")   Wt 56.2 kg (124 lb)   SpO2 95%  Body mass index is 22.68 kg/m².    Hearing excellent.    Dentition upper and lower dentures  Alert, oriented in no acute distress.  Eye contact is good, speech goal directed, affect calm. Neck is relatively supple and without lymphadenopathy.  Lungs are clear without significant rales or wheeze.  Cardiovascular peripheral circulation is satisfactory although pedal pulses are only trace to 1+.  She has a grade 2/6 to 3/6 soft crescendo systolic murmur at the upper right sternal border.  Abdomen is soft without obvious masses or tenderness.  Breast, pelvic, and rectal exams were not performed.  Extremities were without significant edema, cyanosis, or deformity except for the rheumatoid joint deformities most prominent in her hands.       Assessment and Plan. The following treatment and monitoring plan is recommended:    Essential hypertension  Blood pressures under good control with lisinopril and she is on carvedilol and Lasix.  She denies significant lightheadedness.  We briefly reviewed low-salt diet.    SVT (supraventricular tachycardia) (CMS-HCC)  She has " a history of supraventricular tachycardia.  She denies any recent palpitations or lightheadedness.    Chronic kidney disease (CKD), stage III (moderate)  She has chronic renal insufficiency stage III, most recent GFR is 59.  She was encouraged to remain well-hydrated.    Osteoporosis  She has osteoporosis for which she takes Fosamax weekly.  She tolerates this without difficulty she reports.  She gets a DEXA scans through Dr. Robb.    Cardiac pacemaker in situ  She has a cardiac pacemaker and is followed in the pacemaker clinic.        Services suggested: No services needed at this time  Health Care Screening recommendations as per orders if indicated.  Referrals offered: PT/OT/Nutrition counseling/Behavioral Health/Smoking cessation as per orders if indicated.    Discussion today about general wellness and lifestyle habits:    · Prevent falls and reduce trip hazards; Cautioned about securing or removing rugs.  · Have a working fire alarm and carbon monoxide detector;   · Engage in regular physical activity and social activities       Follow-up: 6 months if not sooner.

## 2020-05-21 NOTE — ASSESSMENT & PLAN NOTE
She has a history of supraventricular tachycardia.  She denies any recent palpitations or lightheadedness.

## 2020-05-21 NOTE — ASSESSMENT & PLAN NOTE
Blood pressures under good control with lisinopril and she is on carvedilol and Lasix.  She denies significant lightheadedness.  We briefly reviewed low-salt diet.

## 2020-05-21 NOTE — ASSESSMENT & PLAN NOTE
She has chronic renal insufficiency stage III, most recent GFR is 59.  She was encouraged to remain well-hydrated.

## 2020-05-21 NOTE — ASSESSMENT & PLAN NOTE
She has osteoporosis for which she takes Fosamax weekly.  She tolerates this without difficulty she reports.  She gets a DEXA scans through Dr. Robb.

## 2020-05-29 ENCOUNTER — TELEPHONE (OUTPATIENT)
Dept: MEDICAL GROUP | Facility: MEDICAL CENTER | Age: 85
End: 2020-05-29

## 2020-05-29 NOTE — TELEPHONE ENCOUNTER
Patient is asking for the generic premarin hormone supplement. If there isn't a generic in this medication they are asking if you can please prescribe an alternate hormone supplement.

## 2020-06-03 ENCOUNTER — TELEPHONE (OUTPATIENT)
Dept: MEDICAL GROUP | Facility: MEDICAL CENTER | Age: 85
End: 2020-06-03

## 2020-06-03 RX ORDER — ESTRADIOL 0.5 MG/1
0.5 TABLET ORAL DAILY
Qty: 90 TAB | Refills: 3 | Status: ON HOLD | OUTPATIENT
Start: 2020-06-03 | End: 2020-06-12

## 2020-06-04 ENCOUNTER — APPOINTMENT (OUTPATIENT)
Dept: RADIOLOGY | Facility: MEDICAL CENTER | Age: 85
End: 2020-06-04
Attending: EMERGENCY MEDICINE
Payer: MEDICARE

## 2020-06-04 ENCOUNTER — OFFICE VISIT (OUTPATIENT)
Dept: URGENT CARE | Facility: PHYSICIAN GROUP | Age: 85
End: 2020-06-04
Payer: MEDICARE

## 2020-06-04 ENCOUNTER — HOSPITAL ENCOUNTER (EMERGENCY)
Facility: MEDICAL CENTER | Age: 85
End: 2020-06-04
Attending: EMERGENCY MEDICINE
Payer: MEDICARE

## 2020-06-04 VITALS
HEART RATE: 84 BPM | DIASTOLIC BLOOD PRESSURE: 76 MMHG | SYSTOLIC BLOOD PRESSURE: 190 MMHG | BODY MASS INDEX: 22.92 KG/M2 | RESPIRATION RATE: 17 BRPM | WEIGHT: 124.56 LBS | HEIGHT: 62 IN | TEMPERATURE: 98.8 F | OXYGEN SATURATION: 95 %

## 2020-06-04 VITALS
OXYGEN SATURATION: 94 % | HEIGHT: 62 IN | TEMPERATURE: 97.4 F | BODY MASS INDEX: 22.82 KG/M2 | SYSTOLIC BLOOD PRESSURE: 152 MMHG | WEIGHT: 124 LBS | RESPIRATION RATE: 16 BRPM | DIASTOLIC BLOOD PRESSURE: 98 MMHG | HEART RATE: 87 BPM
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
## 2020-06-04 DIAGNOSIS — S61.411A LACERATION OF RIGHT HAND WITHOUT FOREIGN BODY, INITIAL ENCOUNTER: ICD-10-CM

## 2020-06-04 DIAGNOSIS — S09.90XA INJURY OF HEAD, INITIAL ENCOUNTER: ICD-10-CM

## 2020-06-04 DIAGNOSIS — S62.312A CLOSED DISPLACED FRACTURE OF BASE OF THIRD METACARPAL BONE OF RIGHT HAND, INITIAL ENCOUNTER: ICD-10-CM

## 2020-06-04 DIAGNOSIS — S00.03XA HEMATOMA OF FRONTAL SCALP, INITIAL ENCOUNTER: ICD-10-CM

## 2020-06-04 PROCEDURE — 70450 CT HEAD/BRAIN W/O DYE: CPT

## 2020-06-04 PROCEDURE — 90715 TDAP VACCINE 7 YRS/> IM: CPT | Performed by: EMERGENCY MEDICINE

## 2020-06-04 PROCEDURE — 303747 HCHG EXTRA SUTURE

## 2020-06-04 PROCEDURE — 700111 HCHG RX REV CODE 636 W/ 250 OVERRIDE (IP): Performed by: EMERGENCY MEDICINE

## 2020-06-04 PROCEDURE — 70486 CT MAXILLOFACIAL W/O DYE: CPT

## 2020-06-04 PROCEDURE — 73130 X-RAY EXAM OF HAND: CPT | Mod: RT

## 2020-06-04 PROCEDURE — 90471 IMMUNIZATION ADMIN: CPT

## 2020-06-04 PROCEDURE — 302874 HCHG BANDAGE ACE 2 OR 3""

## 2020-06-04 PROCEDURE — 304217 HCHG IRRIGATION SYSTEM

## 2020-06-04 PROCEDURE — 303485 HCHG DRESSING MEDIUM

## 2020-06-04 PROCEDURE — 99214 OFFICE O/P EST MOD 30 MIN: CPT | Performed by: PHYSICIAN ASSISTANT

## 2020-06-04 PROCEDURE — 29125 APPL SHORT ARM SPLINT STATIC: CPT

## 2020-06-04 PROCEDURE — 304999 HCHG REPAIR-SIMPLE/INTERMED LEVEL 1

## 2020-06-04 PROCEDURE — 99284 EMERGENCY DEPT VISIT MOD MDM: CPT

## 2020-06-04 RX ORDER — LIDOCAINE HYDROCHLORIDE 10 MG/ML
20 INJECTION, SOLUTION INFILTRATION; PERINEURAL ONCE
Status: DISCONTINUED | OUTPATIENT
Start: 2020-06-04 | End: 2020-06-04 | Stop reason: HOSPADM

## 2020-06-04 RX ADMIN — CLOSTRIDIUM TETANI TOXOID ANTIGEN (FORMALDEHYDE INACTIVATED), CORYNEBACTERIUM DIPHTHERIAE TOXOID ANTIGEN (FORMALDEHYDE INACTIVATED), BORDETELLA PERTUSSIS TOXOID ANTIGEN (GLUTARALDEHYDE INACTIVATED), BORDETELLA PERTUSSIS FILAMENTOUS HEMAGGLUTININ ANTIGEN (FORMALDEHYDE INACTIVATED), BORDETELLA PERTUSSIS PERTACTIN ANTIGEN, AND BORDETELLA PERTUSSIS FIMBRIAE 2/3 ANTIGEN 0.5 ML: 5; 2; 2.5; 5; 3; 5 INJECTION, SUSPENSION INTRAMUSCULAR at 17:48

## 2020-06-04 ASSESSMENT — ENCOUNTER SYMPTOMS
TINGLING: 0
VOMITING: 0
NAUSEA: 0
VISUAL CHANGE: 0
EYE REDNESS: 0
HEADACHES: 1
NUMBNESS: 0
LOSS OF CONSCIOUSNESS: 0
EYE DISCHARGE: 0

## 2020-06-04 ASSESSMENT — PAIN DESCRIPTION - DESCRIPTORS: DESCRIPTORS: ACHING

## 2020-06-04 NOTE — ED TRIAGE NOTES
"Pt is accompanied by her son.  C/O a GLF earlier today.  While moving a folding table, she lost her footing forward hitting the object.  C/O head injury, and right hand pain.  Pt denies any domestic high risk areas, or international travel within the past 14 days.    Chief Complaint   Patient presents with   • T-5000 FALL   • Headache   • Hand Pain     BP (!) 183/103   Pulse 99   Temp 37.1 °C (98.8 °F) (Temporal)   Resp 17   Ht 1.575 m (5' 2\")   Wt 56.5 kg (124 lb 9 oz)   SpO2 96%   BMI 22.78 kg/m²     "

## 2020-06-04 NOTE — PROGRESS NOTES
Subjective:      Shira Colón is a 85 y.o. female who presents with Fall (hit head on table leg, laceration on R hand in between fingers x 1 hour)        Fall   The accident occurred 1 to 3 hours ago. The fall occurred while walking. She landed on concrete. The volume of blood lost was minimal. The point of impact was the head. The pain is present in the head, face, right hand and right elbow. The pain is mild. Associated symptoms include headaches. Pertinent negatives include no loss of consciousness, nausea, numbness, tingling, visual change or vomiting. She has tried nothing for the symptoms.     The patient presents to clinic for evaluation secondary to a mechanical ground-level fall.  The patient states she was pulling a folding table from a garage when 1 of the table legs collapsed causing the patient to fall.  The patient states she fell forward hitting her head and cutting her right hand. The patient also scrapped her right elbow.  The patient reports no LOC.  She reports a mild headache as a result of her head injury.  She also reports associated abrasions to her face and nose.  No nausea/vomiting.  No vision changes.  No numbness, tingling, or weakness to her extremities.  The patient reports no anticoagulant use, including aspirin, but states she takes nonsteroidal anti-inflammatories (Aleve) on a daily basis for her arthritis.  The patient states she sustained a laceration to her right hand in between the second and third digits.  She reports no pain or swelling to the to the right hand.  She also reports no decreased range of motion or numbness, tingling, or weakness to the right hand or digits.  The patient states she also scraped her right elbow as a result of the fall.  She reports no pain or swelling to the right elbow.  She also reports no decreased range of motion.  The patient has not taken any medications for her current symptoms.  The patient states her tetanus is out of date.    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 She also has no past medical history of Cold.  MEDS:   Current Outpatient Medications:   •  estradiol (ESTRACE) 0.5 MG tablet, Take 1 Tab by mouth every day., Disp: 90 Tab, Rfl: 3  •  omeprazole (PRILOSEC) 20 MG delayed-release capsule, Take 20 mg by mouth every day., Disp: , Rfl:   •  predniSONE (DELTASONE) 10 MG Tab, , Disp: , Rfl:   •  RESTASIS 0.05 % ophthalmic emulsion, INT 1 GTT IN OU BID, Disp: , Rfl:   •  benzonatate (TESSALON) 100 MG Cap, Take 1 Cap by mouth 3 times a day as needed for Cough., Disp: 30 Cap, Rfl: 0  •  carvedilol (COREG) 12.5 MG Tab, Take 1 Tab by mouth 2 times a day, with meals., Disp: 180 Tab, Rfl: 2  •  furosemide (LASIX) 20 MG Tab, Take 1 Tab by mouth every day., Disp: 90 Tab, Rfl: 2  •  lisinopril (PRINIVIL) 10 MG Tab, Take 1 Tab by mouth every day., Disp: 90 Tab, Rfl: 2  •  potassium chloride ER (KLOR-CON) 10 MEQ tablet, Take 1 Tab by mouth every day., Disp: 90 Tab, Rfl: 3  •  allopurinol (ZYLOPRIM) 100 MG Tab, Take 100 mg by mouth every day., Disp: , Rfl:   •  NON SPECIFIED, , Disp: , Rfl:   •  DUREZOL 0.05 % Emulsion, INSTILL 1 GTT INTO OD QID, Disp: , Rfl: 1  •  Lifitegrast (XIIDRA) 5 % Solution, by Ophthalmic route., Disp: , Rfl:   •  tocilizumab (ACTEMRA) 400 MG/20ML Solution, 400 mg by Intravenous route Q30 DAYS., Disp: , Rfl:   •  Polyvinyl Alcohol-Povidone (REFRESH OP), 2 Drops by Ophthalmic route 2 Times a Day., Disp: , Rfl:   •  nitroglycerin (NITROSTAT) 0.4 MG SUBL, Place 1 Tab under tongue as needed for Chest  "Pain., Disp: 25 Tab, Rfl: 11  •  Calcium Carbonate-Vitamin D (CALTRATE 600+D PO), Take 1 Tab by mouth 2 Times a Day., Disp: , Rfl:   •  Multiple Vitamins-Minerals (CENTRUM SILVER PO), Take 1 Tab by mouth every day., Disp: , Rfl:   ALLERGIES:   Allergies   Allergen Reactions   • Methotrexate      Pt. States it effects her liver.   • Remicade [Infliximab Injection]      Shaking/hot-flashes   • Sulfites Diarrhea   • Other Environmental Runny Nose and Itching     Plants; \"everything\"     SURGHX:   Past Surgical History:   Procedure Laterality Date   • PACEMAKER INSERTION  December 2016    Medtronic Adapta [id number] implanted by Dr. Ang.   • RECOVERY  5/19/2016    Procedure: CATH LAB [facility], [facility] WITH A SHOT OF THE AORTIC ROOT DR. NAYAK;  Surgeon: Recoveryonly Surgery;  Location: SURGERY PRE-POST PROC UNIT [facility] [place];  Service:    • RECOVERY  4/27/2016    Procedure: CATH LAB MELVIN WITH ANESTHESIA ;  Surgeon: Recoveryonly Surgery;  Location: SURGERY PRE-POST PROC UNIT [facility] [place];  Service:    • RECOVERY  11/4/2014    Performed by Cath-Recovery Surgery at SURGERY SAME DAY [facility] ORS   • GASTROSCOPY WITH BIOPSY  5/7/2014    Performed by Darin Barros M.D. at [facility] ORS   • COLONOSCOPY WITH CLIPPING  5/7/2014    Performed by Darin Barros M.D. at [facility] ORS   • BREAST BIOPSY     • HYSTERECTOMY, VAGINAL     • RECTOCELE REPAIR     • TONSILLECTOMY       SOCHX:  reports that she has never smoked. She has never used smokeless tobacco. She reports current alcohol use. She reports that she does not use drugs.  FH: Family history was reviewed, no pertinent findings to report        Review of Systems   HENT: Negative for congestion.         + head injury   Eyes: Negative for discharge and redness.   Cardiovascular: Negative for chest pain.   Gastrointestinal: Negative for nausea and vomiting.   Musculoskeletal: Negative for joint pain.   Skin: Negative for rash.        + laceration to right hand; + " "abrasion to face and nose   Neurological: Positive for headaches. Negative for tingling, loss of consciousness and numbness.          Objective:     /98 (BP Location: Left arm, Patient Position: Sitting, BP Cuff Size: Adult)   Pulse 87   Temp 36.3 °C (97.4 °F) (Temporal)   Resp 16   Ht 1.575 m (5' 2\")   Wt 56.2 kg (124 lb)   SpO2 94%   BMI 22.68 kg/m²      Physical Exam  Constitutional:       General: She is not in acute distress.     Appearance: Normal appearance. She is not ill-appearing.   HENT:      Head: Normocephalic. Contusion present.        Comments:   Forehead:  Localized area of tenderness with associated swelling and ecchymosis to the right forehead region overlying the medial aspect of the right eyebrow.      Right Ear: External ear normal.      Left Ear: External ear normal.      Nose:        Comments:   Nose:  Superficial abrasion to the patient's nose extending the entire length with a small superficial abrasion to the patient's right upper lip under the right nostril.  No active bleeding.  No tenderness to palpation.  No swelling.  No surrounding erythema.  No discharge/drainage.  Eyes:      Extraocular Movements: Extraocular movements intact.      Conjunctiva/sclera: Conjunctivae normal.      Pupils: Pupils are equal, round, and reactive to light.   Neck:      Musculoskeletal: Normal range of motion and neck supple.   Cardiovascular:      Rate and Rhythm: Normal rate.   Pulmonary:      Effort: Pulmonary effort is normal.   Musculoskeletal:      Comments:   Right Hand:  Full-thickness laceration to the second and third web spacing of the right hand extending over the third MCP joint. No active bleeding. No tenderness to palpation.  No swelling.  No ecchymosis. No erythema.   ROM intact -patient demonstrates full active range of motion of her right third digit  Neurovascular intact  Strength 5/5 -flexion/extension against resistance of the right third digit  Right Elbow:  Small fluid " filled blister to the right elbow overlying the olecranon process.  No superficial abrasions.  No bony tenderness.  No swelling.  No ecchymosis.  No erythema.  ROM intact -patient demonstrates full active range of motion of the right elbow  Neurovascular intact  Strength 5/5 -flexion/extension against resistance   Skin:     General: Skin is warm and dry.   Neurological:      General: No focal deficit present.      Mental Status: She is alert and oriented to person, place, and time.                 Assessment/Plan:     1. Fall, initial encounter    2. Injury of head, initial encounter    3. Laceration of right hand without foreign body, initial encounter    The patient's presenting symptoms and physical exam findings are consistent with a head injury and a laceration of the right hand secondary to a mechanical ground-level fall.  The patient states she was pulling a folding table from a garage when on the table legs collapsed causing the patient to fall.  The patient states she fell forward hitting her head, scraping her elbow, and cutting her right hand. She reports no LOC.  On physical exam, the patient had a localized area of tenderness with associated swelling and ecchymosis to the right forehead region overlying the medial aspect of the right elbow.  The patient also had superficial abrasions to her nose and face without active bleeding.  No focal deficits were appreciated.  Additionally, the patient sustained a full-thickness laceration to the second and third web spacing of the right hand sending over the third MCP joint with no active bleeding, tenderness to palpation, swelling, ecchymosis, or erythema.  The patient demonstrates full active range of motion of her right third digit.  Based on the patient's presenting symptoms and physical exam findings, specifically her acute head injury, I recommend the patient be transferred to the [facility] ED for further evaluation of her current symptoms.  I believe the  patient would benefit from CT imaging to rule out acute intracranial process as a result of her fall.  The patient agrees with this plan.  The patient declined transfer via EMS at this time.  The patient's son will transport her to the ED via private vehicle.  Advised the patient of the associated risks of not seeking further care for her current symptoms.  The patient verbalized understanding.    Plan:  Transfer patient to the [facility] ED for further evaluation of her current symptoms.    Please note that this dictation was created using voice recognition software. I have made every reasonable attempt to correct obvious errors, but I expect that there may be errors of grammar and possibly content that I did not discover before finalizing the note.

## 2020-06-05 RX ORDER — CEPHALEXIN 500 MG/1
500 CAPSULE ORAL 4 TIMES DAILY
Qty: 20 CAP | Refills: 0 | Status: SHIPPED | OUTPATIENT
Start: 2020-06-05 | End: 2020-06-10

## 2020-06-05 NOTE — ED NOTES
Dc instructions reviewed with pt and son upon completion of splint application by er tech. To f/u with pcp and ortho , return for worsening s/s

## 2020-06-05 NOTE — DISCHARGE INSTRUCTIONS
You were seen after a fall.  A CAT scan of your head and face demonstrate no fractures.  Your hand does have a fracture in it and you are being placed in a splint.  Please wear this until you follow-up with a hand specialist.  Please call the office first thing in the morning to schedule an appointment.    You were treated today for a laceration. Your physical exam is reassuring. Your evaluation did not show any signs of foreign body in the wound. Your laceration was repaired with 5 stitches.     Your stitches will need to come out in 10 days. Stitches can be removed at your hand surgeon, regular doctor, or any urgent care or emergency department.     Keep the wound dry for the first 48 hours. After 48 hours, you may wash the area with soap and water, however do not soak the wound. Do not swim until your stitches have been removed.     Use petroleum jelly or antibiotic ointment to keep the wound moist and supple. The scar will continue to remodel for 6 months to a year. Exposure to sun will make the scar more noticeable, so use sun protection.     Please come back to the ED if you develop fever, chills, increased redness around the wound, drainage from wound, vomiting, severe progressive headache, confusion, cold dusky fingers, or for any other concerns.

## 2020-06-05 NOTE — ED PROVIDER NOTES
ED Provider Note      Means of Arrival: Private vehicle  History obtained from: Patient      CHIEF COMPLAINT  Chief Complaint   Patient presents with   • T-5000 FALL   • Headache   • Hand Pain       HPI  Shira Colón is a 85 y.o. female who presents after suffering a fall.  The patient was pushing a folding table to help a friend as the table was smoldering.  The patient subsequently had a table fall that she fell onto it.  She did not suffer any burns.  She did strike her head but denies loss of conscious.  Denies any headache, nausea, vomiting, paresthesias, extremity pain, chest pain.  Denies any lightheadedness.    REVIEW OF SYSTEMS  CONSTITUTIONAL:  No fever.  CARDIOVASCULAR:  No chest discomfort.  RESPIRATORY:  No pleuritic chest pain.  GASTROINTESTINAL:  No abdominal pain.  GENITOURINARY:   No dysuria.  MUSCULOSKELETAL:  No arthralgia.  SKIN:   Ecchymosis and swelling to forehead.  NEUROLOGIC:   No headache.  ENDOCRINE:  No facial edema.  HEMATOLOGIC:  No abnormal bleeding.       See HPI for further details.   All other systems are negative.     PAST MEDICAL HISTORY  Past Medical History:   Diagnosis Date   • Abnormal liver function test    • Anesthesia     PONV   • Arthritis     Rheumatoid Arthritis x 30 years   • Bilateral dry eyes 5/30/2018   • Breath shortness     with exertion or outdoor allergies   • Bronchitis      10-15 years ago   • Cough 2/19/2020   • Dental disorder     Full dentures   • Heart burn    • Heart valve disease     mitral valve prolapse   • Hypertension    • Idiopathic gout of ankle 5/30/2018   • Indigestion    • Myocardial infarct (HCC)     • Pacemaker    • Pain      feet secondary to RA=2/10   • Palpitations    • Pneumonia 1964   • Rheumatoid arthritis(714.0)    • Snoring    • SVT (supraventricular tachycardia) ([facility])     SVT/A-Flutter   • Unspecified cataract     Surgery L eye       FAMILY HISTORY  Family History   Problem Relation Age of Onset   • Heart Disease Brother   "      SOCIAL HISTORY   reports that she has never smoked. She has never used smokeless tobacco. She reports current alcohol use. She reports that she does not use drugs.    SURGICAL HISTORY  Past Surgical History:   Procedure Laterality Date   • PACEMAKER INSERTION  December 2016    Medtronic Adapta [id number] implanted by Dr. Ang.   • RECOVERY  5/19/2016    Procedure: CATH LAB [facility], [facility] WITH A SHOT OF THE AORTIC ROOT DR. NAYAK;  Surgeon: Recoveryon Surgery;  Location: SURGERY PRE-POST PROC UNIT [facility] [place];  Service:    • RECOVERY  4/27/2016    Procedure: CATH LAB MELVIN WITH ANESTHESIA ;  Surgeon: Recoveryonly Surgery;  Location: SURGERY PRE-POST PROC UNIT [facility];  Service:    • RECOVERY  11/4/2014    Performed by Cath-Recovery Surgery at SURGERY SAME DAY [facility] ORS   • GASTROSCOPY WITH BIOPSY  5/7/2014    Performed by Darin Barros M.D. at SURGERY [facility] ORS   • COLONOSCOPY WITH CLIPPING  5/7/2014    Performed by Darin Barros M.D. at [facility] ORS   • BREAST BIOPSY     • HYSTERECTOMY, VAGINAL     • RECTOCELE REPAIR     • TONSILLECTOMY         CURRENT MEDICATIONS  Home Medications    **Home medications have not yet been reviewed for this encounter**         ALLERGIES  Allergies   Allergen Reactions   • Methotrexate      Pt. States it effects her liver.   • Remicade [Infliximab Injection]      Shaking/hot-flashes   • Sulfites Diarrhea   • Other Environmental Runny Nose and Itching     Plants; \"everything\"       PHYSICAL EXAM  VITAL SIGNS: BP (!) 190/76   Pulse 84   Temp 37.1 °C (98.8 °F) (Temporal)   Resp 17   Ht 1.575 m (5' 2\")   Wt 56.5 kg (124 lb 9 oz)   SpO2 95%   BMI 22.78 kg/m²    Gen: Alert, oriented  HENT: No racoon eyes, hemotympanum, septal hematoma, facial instability.  Ecchymosis and swelling over bridge of nose and between eyebrows  Eye: EOMI, no chemosis, PERRL  Neck: trachea midline, no tenderness  Resp: no respiratory distress, CTAB, no chest wall tenderness or " crepitus  CV: No JVD, RRR. no m/r/g, + peripheral pulses  Abd: soft, non-distended, non-tender. No ecchymosis  Back: no spinal tenderness or deformities  Ext: No deformities, tenderness or edema.  Significant ecchymosis and swelling over dorsum of right hand.  Distal CSM is intact.  Laceration between fingers of right hand, approximately 4 cm through webspace  Psych: normal mood  Neuro: speech fluent, moves all extremities. GCS 15      RADIOLOGY/PROCEDURES  CT-HEAD W/O   Final Result      No acute intracranial abnormality is identified.      Atrophy      There are periventricular and subcortical white matter changes present.  This finding is nonspecific and could be from previous small vessel ischemia, demyelination, or gliosis.      Frontal soft tissue hematoma.         CT-MAXILLOFACIAL W/O PLUS RECONS   Final Result      No displaced fracture of the facial bones.      DX-HAND 3+ RIGHT   Final Result      Acute distal third metacarpal shaft comminuted fracture with impaction and displacement      Chronic osteoarthritis      Severe osteopenia        Laceration repair  Indication: laceration  The patient was placed in the appropriate position. Local anesthesia achieved with 1% LIDOCAINE WITHOUT epinephrine and the wound was irrigated  until free of any debris or other foreign body. I inspected the wound and could not appreciate any foreign matter.    For cm SUPERFICIAL laceration closed in 1 layer with 5 sutures of 4 -0 size Ethilon suture material with good approximation. A dressing was placed over the wound. There were no immediate complications.  Blood loss: 1mL     COURSE & MEDICAL DECISION MAKING  Pertinent Labs & Imaging studies reviewed. (See chart for details)    Patient resents with mechanical fall with evidence of head strike.  No evidence of intracranial bleed or subdural hematoma in the CAT scan.  No evidence of facial fracture.  She does have a fracture of the right hand, which was discussed with   Qasim, orthopedics,, who does not recommend attempted closed reduction and suggest follow-up in clinic with a hand specialist and splint application.    Patient's laceration was irrigated, inspected, and closed.  She was given return precautions, to subdural guidance    11:40 AM 6/5/2020 I reviewed the x-rays one more time during my note and felt that the cut is relatively close to the bone. I am concerned about a possible open fracture, and called the patient. She is recovering well with no cyanosis or fevers. Cephalexin was called in to the pharmacy ([facility]) 500mg QID x5d, and she and her son are aware that they should pick it up and start the antibiotics.     Appropriate PPE were worn at this encounter.     FINAL IMPRESSION  1. Closed displaced fracture of base of third metacarpal bone of right hand, initial encounter    2. Fall, initial encounter    3. Laceration of right hand without foreign body, initial encounter    4. Hematoma of frontal scalp, initial encounter             DISPOSITION:  Patient will be discharged home in stable condition.    FOLLOW UP:  Nick Mercer M.D.  [address] [facility]  [address]  [facility] [id number] [phone]          Juan Tripp M.D.  [address]  [address]  [facility] [id number] [phone]    Call in 1 day  ask for a hand specialist      OUTPATIENT MEDICATIONS:  Discharge Medication List as of 6/4/2020  6:51 PM

## 2020-06-08 PROBLEM — N95.1 HOT FLUSHES, PERIMENOPAUSAL: Status: ACTIVE | Noted: 2020-06-08

## 2020-06-08 PROBLEM — R45.86 EMOTIONAL LABILITY: Status: ACTIVE | Noted: 2020-06-08

## 2020-06-09 ENCOUNTER — OFFICE VISIT (OUTPATIENT)
Dept: ADMISSIONS | Facility: MEDICAL CENTER | Age: 85
End: 2020-06-09
Attending: ORTHOPAEDIC SURGERY
Payer: MEDICARE

## 2020-06-09 DIAGNOSIS — Z01.812 PRE-OPERATIVE LABORATORY EXAMINATION: ICD-10-CM

## 2020-06-09 LAB — COVID ORDER STATUS COVID19: NORMAL

## 2020-06-09 PROCEDURE — C9803 HOPD COVID-19 SPEC COLLECT: HCPCS

## 2020-06-10 ENCOUNTER — TELEPHONE (OUTPATIENT)
Dept: CARDIOLOGY | Facility: MEDICAL CENTER | Age: 85
End: 2020-06-10

## 2020-06-10 NOTE — TELEPHONE ENCOUNTER
Returned call to Beny. Cardiology appt encouraged if patient needs cardiac clearance. If medical clearance please send to PCP, if not then send requested device checklist for completion. Beny states understanding.     [facility] clearance requirements and pt will need appt if cardiac clearance is required. Contact information provided for call back.

## 2020-06-10 NOTE — TELEPHONE ENCOUNTER
WILBER/sangeeta Swan RN Preadmit screening at [facility]/ calling for clearance response to a clearance request sent to RO from Dr Maharaj. Surgery at [facility] Friday 6/6/12  Please call Beny [phone]

## 2020-06-10 NOTE — OR NURSING
"Pre-admit appointment completed. \"Preparing for your Procedure\" sheet given to Pt with verbal and written instructions. Pt states all instructions given are understood and to call pre-admit or Dr's office for additional questions or any symptoms of illness/covid develop prior to DOS. Medications the patient will take the morning of surgery if needed per anesthesia protocol: allopurinol, carvidelol, omeprazole, prednisone, and all eye drops.    Anesthesia Summary and pt information emailed to Dr. Mosqueda.   "

## 2020-06-11 LAB
SARS-COV-2 RNA RESP QL NAA+PROBE: NOT DETECTED
SPECIMEN SOURCE: NORMAL

## 2020-06-11 NOTE — OR NURSING
We will need the pacemaker information from cardiology and nothing further given low risk surgery.  Thank you.    Mis Mosqueda M.D.  [facility]      On Evan 10, 2020, at 15:37, SMPreadmit <[email or web address] wrote:  ?   Hi Dr. Mosqueda,     This patient is having an ORIF of the Rt long index finger. She was a telephone appointment and just got off the phone with her. She was last seen by her Cardiologist, Dr. Tatum, September of last year. She has had her pacemaker since 2016.  I attached her last EKG from 2016, her recent labs and anesthesia summary. Does she need anything further before proceeding?     Thank you,     Beny Patel for [facility]ЛЕКСАНДР [facility]ARR[facility] (MRN [id number]) as of 6/10/2020 13:57

## 2020-06-11 NOTE — TELEPHONE ENCOUNTER
Faxed anesthesia note to Jade at [facility] with receipt confirmation obtained.    ================================================================    Pts son calls and  warm transfers call. Explained above and encouraged them to f/u with ortho office per previous notes and discussion.

## 2020-06-11 NOTE — TELEPHONE ENCOUNTER
I received a call from Jade @ Dr. Maharaj's office & informed her of your note & she said she'd call pt about it.    I received another call from pt's son Evaristo, he would like more info on why pt needs to be seen for cardiac clearance. Jade did confirm that cardiac clearance is needed & will inform Gama   Evaristo# [phone]

## 2020-06-12 ENCOUNTER — ANESTHESIA (OUTPATIENT)
Dept: SURGERY | Facility: MEDICAL CENTER | Age: 85
End: 2020-06-12
Payer: MEDICARE

## 2020-06-12 ENCOUNTER — ANESTHESIA EVENT (OUTPATIENT)
Dept: SURGERY | Facility: MEDICAL CENTER | Age: 85
End: 2020-06-12
Payer: MEDICARE

## 2020-06-12 ENCOUNTER — HOSPITAL ENCOUNTER (OUTPATIENT)
Facility: MEDICAL CENTER | Age: 85
End: 2020-06-12
Attending: ORTHOPAEDIC SURGERY | Admitting: ORTHOPAEDIC SURGERY
Payer: MEDICARE

## 2020-06-12 ENCOUNTER — APPOINTMENT (OUTPATIENT)
Dept: RADIOLOGY | Facility: MEDICAL CENTER | Age: 85
End: 2020-06-12
Attending: ORTHOPAEDIC SURGERY
Payer: MEDICARE

## 2020-06-12 VITALS
WEIGHT: 122.58 LBS | BODY MASS INDEX: 22.56 KG/M2 | SYSTOLIC BLOOD PRESSURE: 149 MMHG | RESPIRATION RATE: 18 BRPM | HEIGHT: 62 IN | TEMPERATURE: 97.7 F | HEART RATE: 86 BPM | OXYGEN SATURATION: 93 % | DIASTOLIC BLOOD PRESSURE: 89 MMHG

## 2020-06-12 DIAGNOSIS — S62.201A CLOSED FRACTURE OF FIRST METACARPAL BONE OF RIGHT HAND, UNSPECIFIED FRACTURE MORPHOLOGY, UNSPECIFIED PORTION OF METACARPAL, INITIAL ENCOUNTER: ICD-10-CM

## 2020-06-12 LAB — EKG IMPRESSION: NORMAL

## 2020-06-12 PROCEDURE — 160046 HCHG PACU - 1ST 60 MINS PHASE II: Performed by: ORTHOPAEDIC SURGERY

## 2020-06-12 PROCEDURE — 501445 HCHG STAPLER, SKIN DISP: Performed by: ORTHOPAEDIC SURGERY

## 2020-06-12 PROCEDURE — 700111 HCHG RX REV CODE 636 W/ 250 OVERRIDE (IP)

## 2020-06-12 PROCEDURE — 160035 HCHG PACU - 1ST 60 MINS PHASE I: Performed by: ORTHOPAEDIC SURGERY

## 2020-06-12 PROCEDURE — 700101 HCHG RX REV CODE 250: Performed by: ORTHOPAEDIC SURGERY

## 2020-06-12 PROCEDURE — 160048 HCHG OR STATISTICAL LEVEL 1-5: Performed by: ORTHOPAEDIC SURGERY

## 2020-06-12 PROCEDURE — 700111 HCHG RX REV CODE 636 W/ 250 OVERRIDE (IP): Performed by: ANESTHESIOLOGY

## 2020-06-12 PROCEDURE — 160002 HCHG RECOVERY MINUTES (STAT): Performed by: ORTHOPAEDIC SURGERY

## 2020-06-12 PROCEDURE — 160025 RECOVERY II MINUTES (STATS): Performed by: ORTHOPAEDIC SURGERY

## 2020-06-12 PROCEDURE — 700102 HCHG RX REV CODE 250 W/ 637 OVERRIDE(OP)

## 2020-06-12 PROCEDURE — 93010 ELECTROCARDIOGRAM REPORT: CPT | Performed by: INTERNAL MEDICINE

## 2020-06-12 PROCEDURE — C1713 ANCHOR/SCREW BN/BN,TIS/BN: HCPCS | Performed by: ORTHOPAEDIC SURGERY

## 2020-06-12 PROCEDURE — 160039 HCHG SURGERY MINUTES - EA ADDL 1 MIN LEVEL 3: Performed by: ORTHOPAEDIC SURGERY

## 2020-06-12 PROCEDURE — A9270 NON-COVERED ITEM OR SERVICE: HCPCS | Performed by: ANESTHESIOLOGY

## 2020-06-12 PROCEDURE — 93005 ELECTROCARDIOGRAM TRACING: CPT | Performed by: ORTHOPAEDIC SURGERY

## 2020-06-12 PROCEDURE — 502576 HCHG PACK, HAND: Performed by: ORTHOPAEDIC SURGERY

## 2020-06-12 PROCEDURE — 700105 HCHG RX REV CODE 258: Performed by: ORTHOPAEDIC SURGERY

## 2020-06-12 PROCEDURE — 160009 HCHG ANES TIME/MIN: Performed by: ORTHOPAEDIC SURGERY

## 2020-06-12 PROCEDURE — 500881 HCHG PACK, EXTREMITY: Performed by: ORTHOPAEDIC SURGERY

## 2020-06-12 PROCEDURE — 700102 HCHG RX REV CODE 250 W/ 637 OVERRIDE(OP): Performed by: ANESTHESIOLOGY

## 2020-06-12 PROCEDURE — A6454 SELF-ADHER BAND W>=3" <5"/YD: HCPCS | Performed by: ORTHOPAEDIC SURGERY

## 2020-06-12 PROCEDURE — A9270 NON-COVERED ITEM OR SERVICE: HCPCS

## 2020-06-12 PROCEDURE — 160028 HCHG SURGERY MINUTES - 1ST 30 MINS LEVEL 3: Performed by: ORTHOPAEDIC SURGERY

## 2020-06-12 PROCEDURE — 160036 HCHG PACU - EA ADDL 30 MINS PHASE I: Performed by: ORTHOPAEDIC SURGERY

## 2020-06-12 DEVICE — WIRE K- SMOOTH .045 - (3TX6=18): Type: IMPLANTABLE DEVICE | Site: FINGER | Status: FUNCTIONAL

## 2020-06-12 RX ORDER — SODIUM CHLORIDE, SODIUM LACTATE, POTASSIUM CHLORIDE, CALCIUM CHLORIDE 600; 310; 30; 20 MG/100ML; MG/100ML; MG/100ML; MG/100ML
INJECTION, SOLUTION INTRAVENOUS CONTINUOUS
Status: DISCONTINUED | OUTPATIENT
Start: 2020-06-12 | End: 2020-06-12 | Stop reason: HOSPADM

## 2020-06-12 RX ORDER — HYDRALAZINE HYDROCHLORIDE 20 MG/ML
5 INJECTION INTRAMUSCULAR; INTRAVENOUS
Status: DISCONTINUED | OUTPATIENT
Start: 2020-06-12 | End: 2020-06-12 | Stop reason: HOSPADM

## 2020-06-12 RX ORDER — OXYCODONE HCL 5 MG/5 ML
5 SOLUTION, ORAL ORAL
Status: DISCONTINUED | OUTPATIENT
Start: 2020-06-12 | End: 2020-06-12 | Stop reason: HOSPADM

## 2020-06-12 RX ORDER — TRAMADOL HYDROCHLORIDE 50 MG/1
50 TABLET ORAL
Status: COMPLETED | OUTPATIENT
Start: 2020-06-12 | End: 2020-06-12

## 2020-06-12 RX ORDER — OXYCODONE HCL 5 MG/5 ML
10 SOLUTION, ORAL ORAL
Status: DISCONTINUED | OUTPATIENT
Start: 2020-06-12 | End: 2020-06-12 | Stop reason: HOSPADM

## 2020-06-12 RX ORDER — ONDANSETRON 2 MG/ML
INJECTION INTRAMUSCULAR; INTRAVENOUS PRN
Status: DISCONTINUED | OUTPATIENT
Start: 2020-06-12 | End: 2020-06-12 | Stop reason: SURG

## 2020-06-12 RX ORDER — LABETALOL HYDROCHLORIDE 5 MG/ML
5 INJECTION, SOLUTION INTRAVENOUS
Status: DISCONTINUED | OUTPATIENT
Start: 2020-06-12 | End: 2020-06-12 | Stop reason: HOSPADM

## 2020-06-12 RX ORDER — CELECOXIB 200 MG/1
200 CAPSULE ORAL ONCE
Status: COMPLETED | OUTPATIENT
Start: 2020-06-12 | End: 2020-06-12

## 2020-06-12 RX ORDER — ACETAMINOPHEN 500 MG
1000 TABLET ORAL ONCE
Status: COMPLETED | OUTPATIENT
Start: 2020-06-12 | End: 2020-06-12

## 2020-06-12 RX ORDER — ONDANSETRON 2 MG/ML
4 INJECTION INTRAMUSCULAR; INTRAVENOUS
Status: DISCONTINUED | OUTPATIENT
Start: 2020-06-12 | End: 2020-06-12 | Stop reason: HOSPADM

## 2020-06-12 RX ORDER — BUPIVACAINE HYDROCHLORIDE 5 MG/ML
INJECTION, SOLUTION EPIDURAL; INTRACAUDAL
Status: DISCONTINUED | OUTPATIENT
Start: 2020-06-12 | End: 2020-06-12 | Stop reason: HOSPADM

## 2020-06-12 RX ORDER — HYDROCODONE BITARTRATE AND ACETAMINOPHEN 5; 325 MG/1; MG/1
1 TABLET ORAL EVERY 6 HOURS PRN
Qty: 10 TAB | Refills: 0 | Status: SHIPPED | OUTPATIENT
Start: 2020-06-12 | End: 2020-06-17

## 2020-06-12 RX ORDER — CEFAZOLIN SODIUM 1 G/3ML
INJECTION, POWDER, FOR SOLUTION INTRAMUSCULAR; INTRAVENOUS PRN
Status: DISCONTINUED | OUTPATIENT
Start: 2020-06-12 | End: 2020-06-12 | Stop reason: SURG

## 2020-06-12 RX ORDER — DEXAMETHASONE SODIUM PHOSPHATE 4 MG/ML
INJECTION, SOLUTION INTRA-ARTICULAR; INTRALESIONAL; INTRAMUSCULAR; INTRAVENOUS; SOFT TISSUE PRN
Status: DISCONTINUED | OUTPATIENT
Start: 2020-06-12 | End: 2020-06-12 | Stop reason: SURG

## 2020-06-12 RX ADMIN — ONDANSETRON 4 MG: 2 INJECTION INTRAMUSCULAR; INTRAVENOUS at 12:56

## 2020-06-12 RX ADMIN — CELECOXIB 200 MG: 200 CAPSULE ORAL at 10:45

## 2020-06-12 RX ADMIN — CEFAZOLIN 2 G: 1 INJECTION, POWDER, FOR SOLUTION INTRAVENOUS at 12:45

## 2020-06-12 RX ADMIN — SODIUM CHLORIDE, POTASSIUM CHLORIDE, SODIUM LACTATE AND CALCIUM CHLORIDE: 600; 310; 30; 20 INJECTION, SOLUTION INTRAVENOUS at 11:01

## 2020-06-12 RX ADMIN — MIDAZOLAM HYDROCHLORIDE 2 MG: 1 INJECTION, SOLUTION INTRAMUSCULAR; INTRAVENOUS at 12:40

## 2020-06-12 RX ADMIN — FENTANYL CITRATE 25 MCG: 50 INJECTION INTRAMUSCULAR; INTRAVENOUS at 13:41

## 2020-06-12 RX ADMIN — TRAMADOL HYDROCHLORIDE 50 MG: 50 TABLET, FILM COATED ORAL at 14:05

## 2020-06-12 RX ADMIN — ACETAMINOPHEN 1000 MG: 500 TABLET, FILM COATED ORAL at 10:45

## 2020-06-12 RX ADMIN — POVIDONE-IODINE 15 ML: 10 SOLUTION TOPICAL at 11:01

## 2020-06-12 RX ADMIN — FENTANYL CITRATE 100 MCG: 50 INJECTION, SOLUTION INTRAMUSCULAR; INTRAVENOUS at 12:45

## 2020-06-12 RX ADMIN — DEXAMETHASONE SODIUM PHOSPHATE 4 MG: 4 INJECTION, SOLUTION INTRAMUSCULAR; INTRAVENOUS at 12:41

## 2020-06-12 ASSESSMENT — PAIN SCALES - GENERAL: PAIN_LEVEL: 4

## 2020-06-12 NOTE — ANESTHESIA POSTPROCEDURE EVALUATION
Patient: Shira Colón    Procedure Summary     Date:  06/12/20 Room / Location:   OR  / SURGERY [facility]    Anesthesia Start:  1240 Anesthesia Stop:  1323    Procedures:       ORIF, FINGER-LONG FINGER MERACARPAL SHAFT FRACTURE - VS (Right Hand)      PINNING, FRACTURE, PERCUTANEOUS- (Right Hand) Diagnosis:  (FX METACARPAL NECK)    Surgeon:  Jens Maharaj M.D. Responsible Provider:  Jose J Montaño M.D.    Anesthesia Type:  general ASA Status:  3          Final Anesthesia Type: general  Last vitals  BP   Blood Pressure : (!) 175/94    Temp   36.7 °C (98.1 °F)    Pulse   Pulse: (!) 104   Resp   18    SpO2   92 %      Anesthesia Post Evaluation    Patient location during evaluation: PACU  Patient participation: complete - patient participated  Level of consciousness: awake  Pain score: 4    Airway patency: patent  Anesthetic complications: no  Cardiovascular status: hemodynamically stable  Respiratory status: acceptable  Hydration status: euvolemic    PONV: none           Nurse Pain Score: 4 (NPRS)

## 2020-06-12 NOTE — OR NURSING
1515- Patient dressed and resting in recliner chair. Reports tolerable pain to right hand. Dressing c/d/i. Family brought to bedside.  1535- Discharged to care of family.

## 2020-06-12 NOTE — OR SURGEON
Immediate Post OP Note    PreOp Diagnosis: right long finger metacarpal shaft fracture    PostOp Diagnosis: same    Procedure(s):  Right long finger closed reduction and percutaneous pinning  Surgeon(s):  Jens Maharaj M.D.    Anesthesiologist/Type of Anesthesia:  Anesthesiologist: Jose J Montaño M.D./General    Surgical Staff:  Circulator: Tr Pineda R.N.  Scrub Person: Ministerio Kenney    Specimens removed if any:  * No specimens in log *    Estimated Blood Loss: 5cc    Findings: severely osteoporotic bone.    Complications: none        6/12/2020 1:26 PM Jens Maharaj M.D.

## 2020-06-12 NOTE — ANESTHESIA TIME REPORT
Anesthesia Start and Stop Event Times     Date Time Event    6/12/2020 1202 Ready for Procedure     1240 Anesthesia Start     1323 Anesthesia Stop        Responsible Staff  06/12/20    Name Role Begin End    Jose J Montaño M.D. Anesth 1240 1323        Preop Diagnosis (Free Text):  Pre-op Diagnosis     FX METACARPAL NECK        Preop Diagnosis (Codes):    Post op Diagnosis  Displaced fracture of neck of third metacarpal bone, right hand, initial encounter for closed fracture      Premium Reason  Non-Premium    Comments:

## 2020-06-12 NOTE — OR NURSING
1320 Pt arrived from OR, report received from anesthesia and OR RN. Simple mask to 6L O2 in place, breath sounds clear bilaterally. Surgical site to right hand clean, dry and intact. Ice applied to site. Cap refill <3 and pt able to wiggle fingers. SCDs in place and machine functional. Pt arouses to voice, states pain tolerable at 5/10, denies nausea. Initial blood pressure 176/83, Ok'd by Dr. Montaño d/t cardiac history.   1335 Pt states pain 6/10, see MAR. No nausea, tolerating sips of water.   1350 Pt states pain tolerable at 5/10. No nausea.   1405 Pt instructed to cough and deep breath to improve oxygenation. Sating 90-92% on RA at rest. Pain 6/10, see MAR.   1420 Pt continues to c/o 6/10 pain, tolerable at this time while waiting for PO med to take effect. Continues to work on deep breathing.   1435 No changes   1448 Pt meets stage II criteria, report to Arthur RN. Pt states pain 5/10, tolerable.

## 2020-06-12 NOTE — DISCHARGE INSTRUCTIONS
ACTIVITY: Rest and take it easy for the first 24 hours.  A responsible adult is recommended to remain with you during that time.  It is normal to feel sleepy.  We encourage you to not do anything that requires balance, judgment or coordination.    MILD FLU-LIKE SYMPTOMS ARE NORMAL. YOU MAY EXPERIENCE GENERALIZED MUSCLE ACHES, THROAT IRRITATION, HEADACHE AND/OR SOME NAUSEA.    FOR 24 HOURS DO NOT:  Drive, operate machinery or run household appliances.  Drink beer or alcoholic beverages.   Make important decisions or sign legal documents.    SPECIAL INSTRUCTIONS: Keep splint clean, dry and intact until instructed otherwise by Dr. Maharaj. Ice on 20 mins/off 20 minutes, Keep right arm elevated above heart (prop with pillows when sitting or sleeping)     DIET: To avoid nausea, slowly advance diet as tolerated, avoiding spicy or greasy foods for the first day.  Add more substantial food to your diet according to your physician's instructions.  INCREASE FLUIDS AND FIBER TO AVOID CONSTIPATION.    FOLLOW-UP APPOINTMENT:  A follow-up appointment should be arranged with your doctor in office or as previously scheduled; call to schedule.    You should CALL YOUR PHYSICIAN if you develop:  Fever greater than 101 degrees F.  Pain not relieved by medication, or persistent nausea or vomiting.  Excessive bleeding (blood soaking through dressing) or unexpected drainage from the wound.  Extreme redness or swelling around the incision site, drainage of pus or foul smelling drainage.  Inability to urinate or empty your bladder within 8 hours.  Problems with breathing or chest pain.    You should call 911 if you develop problems with breathing or chest pain.  If you are unable to contact your doctor or surgical center, you should go to the nearest emergency room or urgent care center.  Physician's telephone #: [phone]    If any questions arise, call your doctor.  If your doctor is not available, please feel free to call the  Surgical Center at [phone].  The Center is open Monday through Friday from 7AM to 7PM.  You can also call the HEALTH HOTLINE open 24 hours/day, 7 days/week and speak to a nurse at [phone], or toll free at [phone].    A registered nurse may call you a few days after your surgery to see how you are doing after your procedure.    MEDICATIONS: Resume taking daily medication.  Take prescribed pain medication with food.  If no medication is prescribed, you may take non-aspirin pain medication if needed.  PAIN MEDICATION CAN BE VERY CONSTIPATING.  Take a stool softener or laxative such as senokot, pericolace, or milk of magnesia if needed.    Prescription given for NORCO.  Last pain medication Tramadol given at 2:05PM and Tylenol 1,000mg at 10:45AM    If your physician has prescribed pain medication that includes Acetaminophen (Tylenol), do not take additional Acetaminophen (Tylenol) while taking the prescribed medication.    Depression / Suicide Risk    As you are discharged from this [facility] facility, it is important to learn how to keep safe from harming yourself.    Recognize the warning signs:  · Abrupt changes in personality, positive or negative- including increase in energy   · Giving away possessions  · Change in eating patterns- significant weight changes-  positive or negative  · Change in sleeping patterns- unable to sleep or sleeping all the time   · Unwillingness or inability to communicate  · Depression  · Unusual sadness, discouragement and loneliness  · Talk of wanting to die  · Neglect of personal appearance   · Rebelliousness- reckless behavior  · Withdrawal from people/activities they love  · Confusion- inability to concentrate     If you or a loved one observes any of these behaviors or has concerns about self-harm, here's what you can do:  · Talk about it- your feelings and reasons for harming yourself  · Remove any means that you might use to hurt yourself (examples: pills,  rope, extension cords, firearm)  · Get professional help from the community (Mental Health, Substance Abuse, psychological counseling)  · Do not be alone:Call your Safe Contact- someone whom you trust who will be there for you.  · Call your local CRISIS HOTLINE 553-6007 or 275-994-4268  · Call your local Children's Mobile Crisis Response Team [place] (700) 549-7092 or www.HTP  · Call the toll free National Suicide Prevention Hotlines   · National Suicide Prevention Lifeline 550-819-AIQX (3346)  · National Hope Line Network 800-SUICIDE (762-2553)

## 2020-06-12 NOTE — ANESTHESIA QCDR
2019 [facility] Clinical Data Registry (for Quality Improvement)     Postoperative nausea/vomiting risk protocol (Adult = 18 yrs and Pediatric 3-17 yrs)- (430 and 463)  General inhalation anesthetic (NOT TIVA) with PONV risk factors: No  Provision of anti-emetic therapy with at least 2 different classes of agents: N/A  Patient DID NOT receive anti-emetic therapy and reason is documented in Medical Record: N/A    Multimodal Pain Management- (477)  Non-emergent surgery AND patient age >= 18: Yes  Use of Multimodal Pain Management, two or more drugs and/or interventions, NOT including systemic opioids: Yes  Exception: Documented allergy to multiple classes of analgesics: N/A    Smoking Abstinence (404)  Patient is current smoker (cigarette, pipe, e-cig, marijuanna): No  Elective Surgery:   Abstinence instructions provided prior to day of surgery:   Patient abstained from smoking on day of surgery:     Pre-Op Beta-Blocker in Isolated CABG (44)  Isolated CABG AND patient age >= 18:   Beta-blocker admin within 24 hours of surgical incision:   Exception:of medical reason(s) for not administering beta blocker within 24 hours prior to surgical incision (e.g., not  indicated,other medical reason):     PACU assessment of acute postoperative pain prior to Anesthesia Care End- Applies to Patients Age = 18- (ABG7)  Initial PACU pain score is which of the following: < 7/10  Patient unable to report pain score: N/A    Post-anesthetic transfer of care checklist/protocol to PACU/ICU- (426 and 427)  Upon conclusion of case, patient transferred to which of the following locations: PACU/Non-ICU  Use of transfer checklist/protocol: Yes  Exclusion: Service Performed in Patient Hospital Room (and thus did not require transfer): N/A  Unplanned admission to ICU related to anesthesia service up through end of PACU care- (MD51)  Unplanned admission to ICU (not initially anticipated at anesthesia start time): No

## 2020-06-12 NOTE — ANESTHESIA PREPROCEDURE EVALUATION
84 yo right long finger metacarpal shaft fracture ORIF  AORTIC STENOSIS  Cardiac Echo 2019- LV EF:  70    %     FINDINGS  Left Ventricle  Normal left ventricular chamber size. Moderate concentric left   ventricular hypertrophy. Normal left ventricular systolic function.   Left ventricular ejection fraction is visually estimated to be 70%.   Normal regional wall motion. Grade I diastolic dysfunction.     Right Ventricle  Normal right ventricular size and systolic function.     Right Atrium  Normal right atrial size. Normal inferior vena cava size and   inspiratory collapse.     Left Atrium  Mildly dilated left atrium. Left atrial volume index is 35  mL/sq m.     Mitral Valve  Structurally normal mitral valve. No mitral stenosis. Trace mitral   regurgitation.     Aortic Valve  Calcified aortic valve leaflets. Moderate aortic stenosis. Vmax is 3.1    m/s. Transvalvular gradients are - Peak: 38 mmHg, Mean: 24 mmHg.   Stenosis may be underestimated, planimetry showed an area of .5 cm^2.   Moderate aortic insufficiency.     Tricuspid Valve  Structurally normal tricuspid valve. No tricuspid stenosis. Mild   tricuspid regurgitation. Estimated right ventricular systolic pressure    is 30 mmHg.      Palpitations  Abnormal liver function test  Pacemaker  Indigestion  Myocardial infarct (HCC)  Heart valve disease  SVT (supraventricular tachycardia) (HCC)  Pneumonia  Pain  Heart burn  Snoring  Rheumatoid arthritis(714.0)  Arthritis  Dental disorder  Unspecified cataract  Anesthesia  Bilateral dry eyes  Idiopathic gout of ankle  Cough  Hot flushes, perimenopausal  Emotional lability  Bronchitis      Relevant Problems   NEURO   (+) History of MI (myocardial infarction)      CARDIAC   (+) CAD (coronary artery disease)   (+) Cardiac pacemaker in situ   (+) Essential hypertension   (+) Mitral regurgitation   (+) Nonrheumatic aortic valve stenosis   (+) Other specified cardiac dysrhythmias   (+) SVT (supraventricular tachycardia)  (HCC)      GI   (+) Gastroesophageal reflux disease without esophagitis         (+) Chronic kidney disease (CKD), stage III (moderate) (HCC)      Other   (+) Actinic keratosis   (+) Anemia   (+) Bradycardia   (+) Chest pain   (+) Cough   (+) Dilated cardiomyopathy (HCC)   (+) Emotional lability   (+) High risk medication use   (+) RA (rheumatoid arthritis) (HCC)       Physical Exam    Airway   Mallampati: II  TM distance: >3 FB  Neck ROM: full       Cardiovascular - normal exam  Rhythm: regular  Rate: normal  (-) murmur     Dental    Pulmonary - normal exam  Breath sounds clear to auscultation     Abdominal    Neurological              Anesthesia Plan    ASA 3 (aortic stenosis)   ASA physical status 3 criteria: other (comment)    Plan - MAC             Induction: intravenous    Postoperative Plan: Postoperative administration of opioids is intended.    Pertinent diagnostic labs and testing reviewed    Informed Consent:    Anesthetic plan and risks discussed with patient.    Use of blood products discussed with: patient whom consented to blood products.

## 2020-06-13 NOTE — OP REPORT
DATE OF SERVICE:  06/12/2020    PREOPERATIVE DIAGNOSES:  Right long finger metacarpal fracture.    POSTOPERATIVE DIAGNOSES:  Right long finger metacarpal fracture.    PROCEDURE PERFORMED:  Right long finger metacarpal neck fracture, closed   reduction and percutaneous pinning.    SURGEON:  Jens Maharaj MD.    ANESTHESIOLOGIST:  Jose J Montaño MD.    ANESTHESIA:  MAC.    BLOOD LOSS:  Less than 10 mL.    COMPLICATIONS:  None.    DISPOSITION:  Stable to PACU.    OPERATIVE INDICATIONS:  The patient is a very pleasant 85-year-old right-hand   dominant female who presented to my office for evaluation of her right long   finger on Thursday 06/04/2020, the patient fell and hit her right hand, she   had a small laceration noted over the proximal phalanx base of the right long   finger.  After reviewing her x-rays, she did show a comminuted metacarpal neck   fracture with some significant shortening and displacement.  I discussed this   with both her and her son.  We discussed operative intervention secondary to   this significant shortening.  Risks and benefits of surgery including but not   limited to damage to surrounding nerves, arteries, veins, infection,   recurrence, nonunion, malunion, need for reoperation were all discussed.    Despite these risks, the patient did consent for surgery.  She did want to try   to get back to her normal baseline.  Despite her rheumatoid arthritis, she   had pretty good function with her hands and she had been having significant   pain secondary to the fracture.  I did discuss with her secondary to her   comorbidities that we would attempt an intramedullary nail versus a closed   reduction and percutaneous pinning to at least give some stability to the   fracture as well as try to increase the length.    OPERATION IN DETAIL:  On 06/12/2020, the patient was seen in the preoperative   area.  Her right long finger was marked.  Again, all the risks and benefits of   the surgery were  discussed with both her and her son.  The patient was   brought to the operating room and placed in supine position.  MAC anesthesia   was administered.  A formal timeout was performed identifying the right long   finger as the correct finger as well as perioperative antibiotics given.  The   right hand was then prepped, marked and draped in the normal sterile fashion.    Using gentle axial traction as well as hyperextension, the finger was   manipulated and the fracture appeared to gain significant length.  There was   good reduction.  Her bone was significantly osteoporotic and there were no   signs of any callus formation.  I did, at this time, elect for a closed   reduction and percutaneous pinning, starting with the ulnar most metacarpal   recess, the metacarpal recess was identified under fluoroscopy and a 0.045   K-wire passed across the fracture and radial side was then identified and   under fluoroscopic guidance, a K-wire passed across the fracture.  X-ray   confirmed good positioning.  There was improvement of the alignment as well as   the length.  The bone had no intramedullary cancellous bone noted, but was   fixed in the cortices bilaterally.  I will have her follow up with me in 2   weeks for reevaluation and reassessment.  She will stay in her brace at this   time.  The wound was covered with Xeroform, 4 x 4, Sof-Rol, and a volar   resting splint.       ____________________________________     MD ROSMERY English / FRANTZ    DD:  06/12/2020 13:31:47  DT:  06/12/2020 22:26:33    D#:  [id number]  Job#:  [id number]

## 2020-06-29 ENCOUNTER — OFFICE VISIT (OUTPATIENT)
Dept: CARDIOLOGY | Facility: MEDICAL CENTER | Age: 85
End: 2020-06-29
Payer: MEDICARE

## 2020-06-29 ENCOUNTER — NON-PROVIDER VISIT (OUTPATIENT)
Dept: CARDIOLOGY | Facility: MEDICAL CENTER | Age: 85
End: 2020-06-29
Payer: MEDICARE

## 2020-06-29 VITALS
HEIGHT: 63 IN | WEIGHT: 123 LBS | DIASTOLIC BLOOD PRESSURE: 78 MMHG | SYSTOLIC BLOOD PRESSURE: 120 MMHG | OXYGEN SATURATION: 97 % | BODY MASS INDEX: 21.79 KG/M2 | HEART RATE: 82 BPM

## 2020-06-29 VITALS
DIASTOLIC BLOOD PRESSURE: 78 MMHG | HEART RATE: 82 BPM | WEIGHT: 123 LBS | SYSTOLIC BLOOD PRESSURE: 120 MMHG | BODY MASS INDEX: 21.79 KG/M2 | HEIGHT: 63 IN | OXYGEN SATURATION: 97 %

## 2020-06-29 DIAGNOSIS — R00.1 BRADYCARDIA: ICD-10-CM

## 2020-06-29 DIAGNOSIS — I25.2 HISTORY OF MI (MYOCARDIAL INFARCTION): ICD-10-CM

## 2020-06-29 DIAGNOSIS — Z79.899 HIGH RISK MEDICATION USE: ICD-10-CM

## 2020-06-29 DIAGNOSIS — R06.02 SOB (SHORTNESS OF BREATH): ICD-10-CM

## 2020-06-29 DIAGNOSIS — I25.10 CORONARY ARTERY DISEASE INVOLVING NATIVE CORONARY ARTERY OF NATIVE HEART WITHOUT ANGINA PECTORIS: Chronic | ICD-10-CM

## 2020-06-29 DIAGNOSIS — I35.0 NONRHEUMATIC AORTIC VALVE STENOSIS: ICD-10-CM

## 2020-06-29 DIAGNOSIS — Z95.0 CARDIAC PACEMAKER IN SITU: ICD-10-CM

## 2020-06-29 DIAGNOSIS — I10 ESSENTIAL HYPERTENSION: Chronic | ICD-10-CM

## 2020-06-29 DIAGNOSIS — N18.30 CHRONIC KIDNEY DISEASE (CKD), STAGE III (MODERATE): ICD-10-CM

## 2020-06-29 DIAGNOSIS — E87.1 HYPONATREMIA: ICD-10-CM

## 2020-06-29 DIAGNOSIS — Z00.00 ROUTINE GENERAL MEDICAL EXAMINATION AT A HEALTH CARE FACILITY: ICD-10-CM

## 2020-06-29 DIAGNOSIS — R07.9 CHEST PAIN, UNSPECIFIED TYPE: ICD-10-CM

## 2020-06-29 DIAGNOSIS — I42.0 DILATED CARDIOMYOPATHY (HCC): ICD-10-CM

## 2020-06-29 DIAGNOSIS — D64.9 ANEMIA, UNSPECIFIED TYPE: ICD-10-CM

## 2020-06-29 DIAGNOSIS — I34.0 MITRAL VALVE INSUFFICIENCY, UNSPECIFIED ETIOLOGY: ICD-10-CM

## 2020-06-29 PROCEDURE — 99214 OFFICE O/P EST MOD 30 MIN: CPT | Performed by: INTERNAL MEDICINE

## 2020-06-29 PROCEDURE — 93280 PM DEVICE PROGR EVAL DUAL: CPT | Performed by: NURSE PRACTITIONER

## 2020-06-29 RX ORDER — HYDROCODONE BITARTRATE AND ACETAMINOPHEN 5; 325 MG/1; MG/1
TABLET ORAL
COMMUNITY
End: 2021-01-01

## 2020-06-29 ASSESSMENT — ENCOUNTER SYMPTOMS
SHORTNESS OF BREATH: 0
CLAUDICATION: 0
STRIDOR: 0
WEAKNESS: 0
PALPITATIONS: 0
BRUISES/BLEEDS EASILY: 0
RESPIRATORY NEGATIVE: 1
SPUTUM PRODUCTION: 0
GASTROINTESTINAL NEGATIVE: 1
WHEEZING: 0
SORE THROAT: 0
EYES NEGATIVE: 1
HEMOPTYSIS: 0
PND: 0
CHILLS: 0
CONSTITUTIONAL NEGATIVE: 1
NEUROLOGICAL NEGATIVE: 1
COUGH: 0
CARDIOVASCULAR NEGATIVE: 1
MUSCULOSKELETAL NEGATIVE: 1
LOSS OF CONSCIOUSNESS: 0
DIZZINESS: 0
ORTHOPNEA: 0
FEVER: 0

## 2020-06-29 NOTE — PROGRESS NOTES
Device is working normally. 46 brief mode switching episodes (<0.1% of total time).  Normal sensing of RA lead; unable to measure R waves. Stable capture of RA and RV leads; stable impedances. Battery longevity is 8.5 years.  No changes are made today.    She does see Dr. Tatum today too.    FU in 6 months for next PM check at [facility] office.    Collaborating MD: Deepti
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
## 2020-07-06 ENCOUNTER — HOSPITAL ENCOUNTER (OUTPATIENT)
Dept: LAB | Facility: MEDICAL CENTER | Age: 85
End: 2020-07-06
Attending: INTERNAL MEDICINE
Payer: MEDICARE

## 2020-07-06 ENCOUNTER — HOSPITAL ENCOUNTER (OUTPATIENT)
Dept: CARDIOLOGY | Facility: MEDICAL CENTER | Age: 85
End: 2020-07-06
Attending: INTERNAL MEDICINE
Payer: MEDICARE

## 2020-07-06 DIAGNOSIS — I10 ESSENTIAL HYPERTENSION: Chronic | ICD-10-CM

## 2020-07-06 DIAGNOSIS — Z95.0 CARDIAC PACEMAKER IN SITU: ICD-10-CM

## 2020-07-06 DIAGNOSIS — I34.0 MITRAL VALVE INSUFFICIENCY, UNSPECIFIED ETIOLOGY: ICD-10-CM

## 2020-07-06 DIAGNOSIS — I42.0 DILATED CARDIOMYOPATHY (HCC): ICD-10-CM

## 2020-07-06 DIAGNOSIS — R07.9 CHEST PAIN, UNSPECIFIED TYPE: ICD-10-CM

## 2020-07-06 DIAGNOSIS — N18.30 CHRONIC KIDNEY DISEASE (CKD), STAGE III (MODERATE): ICD-10-CM

## 2020-07-06 DIAGNOSIS — Z79.899 HIGH RISK MEDICATION USE: ICD-10-CM

## 2020-07-06 DIAGNOSIS — R06.02 SOB (SHORTNESS OF BREATH): ICD-10-CM

## 2020-07-06 LAB — NT-PROBNP SERPL IA-MCNC: 465 PG/ML (ref 0–125)

## 2020-07-06 PROCEDURE — 83880 ASSAY OF NATRIURETIC PEPTIDE: CPT

## 2020-07-06 PROCEDURE — 36415 COLL VENOUS BLD VENIPUNCTURE: CPT

## 2020-07-06 PROCEDURE — 93306 TTE W/DOPPLER COMPLETE: CPT

## 2020-07-07 ENCOUNTER — TELEPHONE (OUTPATIENT)
Dept: MEDICAL GROUP | Facility: MEDICAL CENTER | Age: 85
End: 2020-07-07

## 2020-07-07 LAB
LV EJECT FRACT  99904: 60
LV EJECT FRACT MOD 2C 99903: 68.76
LV EJECT FRACT MOD 4C 99902: 52.37
LV EJECT FRACT MOD BP 99901: 61.23

## 2020-07-07 PROCEDURE — 93306 TTE W/DOPPLER COMPLETE: CPT | Mod: 26 | Performed by: INTERNAL MEDICINE

## 2020-07-07 RX ORDER — ESTRADIOL 0.5 MG/1
0.5 TABLET ORAL DAILY
Qty: 90 TAB | Refills: 3 | Status: SHIPPED | OUTPATIENT
Start: 2020-07-07 | End: 2020-11-20 | Stop reason: SDUPTHER

## 2020-07-07 NOTE — ASSESSMENT & PLAN NOTE
.She reports that her rheumatoid arthritis is fairly stable and followed closely by Dr. Robb.Ulnar deviation of the digits she thinks is fairly stable  
Blood pressure when she came in today was a little elevated but repeat check was satisfactory.  She continues lisinopril without difficulty.  She denies lightheadedness.  
Gastroesophageal reflux is under fairly good control with diet and Prilosec.  
She has a history of coronary artery disease with no recent chest pain or significant palpitations or change in exercise tolerance.  
She has mild aortic stenosis as documented on echocardiogram with no recent aggravation of symptoms.  
She has mild renal insufficiency with GFR 54.  She is not careful about remaining well hydrated.  
She has osteoporosis for which she continues Fosamax without difficulty.  
She has problems with bilateral dry eyes.  Among the medication she is taking for this is [place] [facility] which she thinks has been causing irritation of the skin on her left hand.  She stopped the medication and the dermatitis resolved.  She continues taking Restasis.  She reports that she will re-start the [place] [facility] and stop the medication immediately if she notes further dermatitis.  
She recently had problems with discomfort in 1 ankle.  Uric acid level was elevated and it was thought that this represented gout.  She thus was started on allopurinol.  
no

## 2020-07-07 NOTE — TELEPHONE ENCOUNTER
Pt is requesting a refill on her estradiol, she said [facility] you D/C'ed this but she still takes it.  [facility]

## 2020-08-19 ENCOUNTER — OFFICE VISIT (OUTPATIENT)
Dept: MEDICAL GROUP | Facility: MEDICAL CENTER | Age: 85
End: 2020-08-19
Payer: MEDICARE

## 2020-08-19 VITALS
OXYGEN SATURATION: 98 % | RESPIRATION RATE: 16 BRPM | HEIGHT: 63 IN | BODY MASS INDEX: 21.97 KG/M2 | WEIGHT: 124 LBS | DIASTOLIC BLOOD PRESSURE: 72 MMHG | HEART RATE: 85 BPM | TEMPERATURE: 98.7 F | SYSTOLIC BLOOD PRESSURE: 124 MMHG

## 2020-08-19 DIAGNOSIS — I47.10 SVT (SUPRAVENTRICULAR TACHYCARDIA) (HCC): ICD-10-CM

## 2020-08-19 DIAGNOSIS — I35.0 NONRHEUMATIC AORTIC VALVE STENOSIS: ICD-10-CM

## 2020-08-19 DIAGNOSIS — N18.30 CHRONIC KIDNEY DISEASE (CKD), STAGE III (MODERATE): ICD-10-CM
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
## 2020-08-19 DIAGNOSIS — I10 ESSENTIAL HYPERTENSION: ICD-10-CM

## 2020-08-19 PROCEDURE — 99214 OFFICE O/P EST MOD 30 MIN: CPT | Performed by: INTERNAL MEDICINE

## 2020-08-19 NOTE — ASSESSMENT & PLAN NOTE
She has a history of hypertension.  Her blood pressure currently is satisfactory.  She continues lisinopril and is on Lasix.  She denies lightheadedness.  She tries to follow a low-salt diet.
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
## 2020-08-19 NOTE — ASSESSMENT & PLAN NOTE
She has stage III chronic renal insufficiency with most recent GFR 59.  She tries to remain well-hydrated.

## 2020-08-19 NOTE — ASSESSMENT & PLAN NOTE
Recent echocardiogram again confirms relatively severe aortic stenosis that is asymptomatic the patient reports.  She is followed for this by Dr. Tatum.  She denies any significant exertional dyspnea or chest pain.

## 2020-09-13 DIAGNOSIS — I50.22 CHRONIC SYSTOLIC CONGESTIVE HEART FAILURE (HCC): ICD-10-CM

## 2020-09-13 DIAGNOSIS — I25.10 CORONARY ARTERY DISEASE INVOLVING NATIVE CORONARY ARTERY OF NATIVE HEART WITHOUT ANGINA PECTORIS: ICD-10-CM

## 2020-09-13 DIAGNOSIS — R60.9 EDEMA, UNSPECIFIED TYPE: ICD-10-CM

## 2020-09-15 RX ORDER — FUROSEMIDE 20 MG/1
TABLET ORAL
Qty: 90 TAB | Refills: 3 | Status: SHIPPED | OUTPATIENT
Start: 2020-09-15 | End: 2021-01-01

## 2020-09-15 RX ORDER — CARVEDILOL 12.5 MG/1
TABLET ORAL
Qty: 180 TAB | Refills: 3 | Status: SHIPPED | OUTPATIENT
Start: 2020-09-15 | End: 2021-01-01 | Stop reason: SDUPTHER

## 2020-09-15 RX ORDER — LISINOPRIL 10 MG/1
TABLET ORAL
Qty: 90 TAB | Refills: 3 | Status: SHIPPED | OUTPATIENT
Start: 2020-09-15 | End: 2021-01-01 | Stop reason: SDUPTHER

## 2020-10-17 ENCOUNTER — TELEPHONE (OUTPATIENT)
Dept: CARDIOLOGY | Facility: MEDICAL CENTER | Age: 85
End: 2020-10-17

## 2020-10-18 NOTE — TELEPHONE ENCOUNTER
Your patient has been flagged through our Val[facility] [employer] program with the following echocardiogram results:    Mod-Severe Aortic Stenosis    Patient didn't get called on echo results or have follow up post echo. Message sent to RN to call patient on symptoms and schedule follow up.    If appropriate, please place Referral to Cardiology Structural Heart Program    Please let us know if you have any questions about this program.    Soila Bernabe DNP, Cardiology APRN, AGACNP-BC

## 2020-10-26 NOTE — TELEPHONE ENCOUNTER
Called patient to give her echo results and ask her to schedule apt with Dr. Tatum. Left  to call us back. SC

## 2020-10-27 NOTE — TELEPHONE ENCOUNTER
"Received call from [facility] 10/26 4:03pm:    \"Returning a call about her ultrasound results.\"    Please call the Pt back at [phone]    Thank you,  Daysi MONTENEGRO   "

## 2020-10-28 NOTE — TELEPHONE ENCOUNTER
"Contacted pt and assessed current cardiac status. Pt denies any symptoms of severe AS and reports that she \"feels fine\". Son was on the call as well. Pt scheduled for 6m follow up with Dr. Tatum and PPM check 11/10 (slightly earlier for close follow up).   "

## 2020-11-10 ENCOUNTER — OFFICE VISIT (OUTPATIENT)
Dept: CARDIOLOGY | Facility: MEDICAL CENTER | Age: 85
End: 2020-11-10
Payer: MEDICARE

## 2020-11-10 ENCOUNTER — NON-PROVIDER VISIT (OUTPATIENT)
Dept: CARDIOLOGY | Facility: MEDICAL CENTER | Age: 85
End: 2020-11-10
Payer: MEDICARE

## 2020-11-10 VITALS
WEIGHT: 128.6 LBS | DIASTOLIC BLOOD PRESSURE: 88 MMHG | HEIGHT: 63 IN | BODY MASS INDEX: 22.79 KG/M2 | HEART RATE: 86 BPM | SYSTOLIC BLOOD PRESSURE: 154 MMHG | OXYGEN SATURATION: 93 %

## 2020-11-10 VITALS
DIASTOLIC BLOOD PRESSURE: 82 MMHG | WEIGHT: 128 LBS | HEIGHT: 63 IN | HEART RATE: 86 BPM | SYSTOLIC BLOOD PRESSURE: 146 MMHG | BODY MASS INDEX: 22.68 KG/M2 | OXYGEN SATURATION: 93 %

## 2020-11-10 DIAGNOSIS — I10 ESSENTIAL HYPERTENSION: Chronic | ICD-10-CM

## 2020-11-10 DIAGNOSIS — Z95.0 CARDIAC PACEMAKER IN SITU: ICD-10-CM

## 2020-11-10 DIAGNOSIS — I35.0 NONRHEUMATIC AORTIC VALVE STENOSIS: ICD-10-CM

## 2020-11-10 DIAGNOSIS — I34.0 MITRAL VALVE INSUFFICIENCY, UNSPECIFIED ETIOLOGY: ICD-10-CM

## 2020-11-10 DIAGNOSIS — I44.2 AV BLOCK, COMPLETE (HCC): ICD-10-CM

## 2020-11-10 DIAGNOSIS — I42.0 DILATED CARDIOMYOPATHY (HCC): ICD-10-CM

## 2020-11-10 DIAGNOSIS — D64.9 ANEMIA, UNSPECIFIED TYPE: ICD-10-CM

## 2020-11-10 DIAGNOSIS — Z79.899 HIGH RISK MEDICATION USE: ICD-10-CM

## 2020-11-10 DIAGNOSIS — I25.10 CORONARY ARTERY DISEASE INVOLVING NATIVE CORONARY ARTERY OF NATIVE HEART WITHOUT ANGINA PECTORIS: Chronic | ICD-10-CM

## 2020-11-10 DIAGNOSIS — I25.2 HISTORY OF MI (MYOCARDIAL INFARCTION): ICD-10-CM

## 2020-11-10 DIAGNOSIS — R00.1 BRADYCARDIA: ICD-10-CM

## 2020-11-10 DIAGNOSIS — M05.79 RHEUMATOID ARTHRITIS INVOLVING MULTIPLE SITES WITH POSITIVE RHEUMATOID FACTOR (HCC): ICD-10-CM

## 2020-11-10 DIAGNOSIS — I47.10 SVT (SUPRAVENTRICULAR TACHYCARDIA) (HCC): ICD-10-CM
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
## 2020-11-10 PROBLEM — R05.9 COUGH: Status: RESOLVED | Noted: 2020-02-19 | Resolved: 2020-11-10

## 2020-11-10 PROBLEM — R45.86 EMOTIONAL LABILITY: Status: RESOLVED | Noted: 2020-06-08 | Resolved: 2020-11-10

## 2020-11-10 PROCEDURE — 93280 PM DEVICE PROGR EVAL DUAL: CPT | Performed by: NURSE PRACTITIONER

## 2020-11-10 PROCEDURE — 99214 OFFICE O/P EST MOD 30 MIN: CPT | Performed by: INTERNAL MEDICINE

## 2020-11-10 RX ORDER — GEL BASE NO.41
GEL (GRAM) MISCELLANEOUS PRN
COMMUNITY
End: 2021-01-01

## 2020-11-10 RX ORDER — EPINASTINE HCL 0.05 %
DROPS OPHTHALMIC (EYE) PRN
COMMUNITY
Start: 2020-09-21 | End: 2021-01-01

## 2020-11-10 RX ORDER — POTASSIUM CHLORIDE 750 MG/1
TABLET, FILM COATED, EXTENDED RELEASE ORAL
COMMUNITY
End: 2020-11-10

## 2020-11-10 RX ORDER — FLUOROMETHOLONE 0.1 %
SUSPENSION, DROPS(FINAL DOSAGE FORM)(ML) OPHTHALMIC (EYE)
COMMUNITY
End: 2020-11-10

## 2020-11-10 RX ORDER — ALENDRONATE SODIUM 70 MG/1
70 TABLET ORAL
COMMUNITY
Start: 2020-08-28 | End: 2021-01-01

## 2020-11-10 RX ORDER — COVID-19 ANTIGEN TEST
220 KIT MISCELLANEOUS 2 TIMES DAILY PRN
COMMUNITY

## 2020-11-10 ASSESSMENT — ENCOUNTER SYMPTOMS
CARDIOVASCULAR NEGATIVE: 1
COUGH: 0
PALPITATIONS: 0
CONSTITUTIONAL NEGATIVE: 1
BRUISES/BLEEDS EASILY: 0
RESPIRATORY NEGATIVE: 1
SPUTUM PRODUCTION: 0
STRIDOR: 0
NEUROLOGICAL NEGATIVE: 1
CHILLS: 0
MUSCULOSKELETAL NEGATIVE: 1
DIZZINESS: 0
EYES NEGATIVE: 1
GASTROINTESTINAL NEGATIVE: 1
FEVER: 0
ORTHOPNEA: 0
HEMOPTYSIS: 0
PND: 0
SORE THROAT: 0
LOSS OF CONSCIOUSNESS: 0
SHORTNESS OF BREATH: 0
CLAUDICATION: 0
WHEEZING: 0
WEAKNESS: 0

## 2020-11-10 NOTE — PROGRESS NOTES
Device is working normally. 16 mode switching episodes (all <10 seconds, <0.1% of total time).  Normal sensing of RA lead; unable to measure R waves. Stable capture of RA and RV leads; stable impedances. Battery longevity is 8 years.  No changes are made today.    FU in 6 months for next PM check with me.    She does see Dr. Tatum today too.

## 2020-11-20 RX ORDER — ESTRADIOL 0.5 MG/1
0.5 TABLET ORAL DAILY
Qty: 90 TAB | Refills: 3 | Status: SHIPPED | OUTPATIENT
Start: 2020-11-20 | End: 2021-01-01

## 2020-12-11 DIAGNOSIS — I25.10 CORONARY ARTERY DISEASE INVOLVING NATIVE CORONARY ARTERY OF NATIVE HEART WITHOUT ANGINA PECTORIS: ICD-10-CM

## 2020-12-14 RX ORDER — POTASSIUM CHLORIDE 750 MG/1
TABLET, FILM COATED, EXTENDED RELEASE ORAL
Qty: 90 TAB | Refills: 3 | Status: SHIPPED | OUTPATIENT
Start: 2020-12-14 | End: 2021-01-01 | Stop reason: SDUPTHER

## 2021-01-01 ENCOUNTER — HOSPITAL ENCOUNTER (OUTPATIENT)
Dept: LAB | Facility: MEDICAL CENTER | Age: 86
End: 2021-08-10
Attending: INTERNAL MEDICINE
Payer: MEDICARE

## 2021-01-01 ENCOUNTER — HOSPITAL ENCOUNTER (OUTPATIENT)
Dept: LAB | Facility: MEDICAL CENTER | Age: 86
End: 2021-03-26
Attending: INTERNAL MEDICINE
Payer: MEDICARE

## 2021-01-01 ENCOUNTER — APPOINTMENT (OUTPATIENT)
Dept: RADIOLOGY | Facility: MEDICAL CENTER | Age: 86
DRG: 682 | End: 2021-01-01
Attending: EMERGENCY MEDICINE
Payer: MEDICARE

## 2021-01-01 ENCOUNTER — OFFICE VISIT (OUTPATIENT)
Dept: CARDIOLOGY | Facility: MEDICAL CENTER | Age: 86
End: 2021-01-01
Payer: MEDICARE

## 2021-01-01 ENCOUNTER — PATIENT MESSAGE (OUTPATIENT)
Dept: CARDIOLOGY | Facility: MEDICAL CENTER | Age: 86
End: 2021-01-01

## 2021-01-01 ENCOUNTER — OFFICE VISIT (OUTPATIENT)
Dept: MEDICAL GROUP | Facility: MEDICAL CENTER | Age: 86
End: 2021-01-01
Payer: MEDICARE

## 2021-01-01 ENCOUNTER — TELEPHONE (OUTPATIENT)
Dept: CARDIOLOGY | Facility: MEDICAL CENTER | Age: 86
End: 2021-01-01

## 2021-01-01 ENCOUNTER — HOSPITAL ENCOUNTER (INPATIENT)
Facility: MEDICAL CENTER | Age: 86
LOS: 5 days | DRG: 682 | End: 2022-01-05
Attending: EMERGENCY MEDICINE | Admitting: HOSPITALIST
Payer: MEDICARE

## 2021-01-01 ENCOUNTER — IMMUNIZATION (OUTPATIENT)
Dept: FAMILY PLANNING/WOMEN'S HEALTH CLINIC | Facility: IMMUNIZATION CENTER | Age: 86
End: 2021-01-01
Attending: INTERNAL MEDICINE
Payer: MEDICARE

## 2021-01-01 ENCOUNTER — HOSPITAL ENCOUNTER (OUTPATIENT)
Dept: LAB | Facility: MEDICAL CENTER | Age: 86
End: 2021-03-26
Attending: SPECIALIST
Payer: MEDICARE

## 2021-01-01 ENCOUNTER — TELEPHONE (OUTPATIENT)
Dept: HEALTH INFORMATION MANAGEMENT | Facility: OTHER | Age: 86
End: 2021-01-01

## 2021-01-01 ENCOUNTER — NON-PROVIDER VISIT (OUTPATIENT)
Dept: CARDIOLOGY | Facility: MEDICAL CENTER | Age: 86
End: 2021-01-01
Payer: MEDICARE

## 2021-01-01 ENCOUNTER — HOSPITAL ENCOUNTER (OUTPATIENT)
Dept: CARDIOLOGY | Facility: MEDICAL CENTER | Age: 86
End: 2021-05-10
Attending: INTERNAL MEDICINE
Payer: MEDICARE

## 2021-01-01 ENCOUNTER — HOSPITAL ENCOUNTER (OUTPATIENT)
Dept: RADIOLOGY | Facility: MEDICAL CENTER | Age: 86
End: 2021-03-19
Attending: INTERNAL MEDICINE
Payer: MEDICARE

## 2021-01-01 VITALS
OXYGEN SATURATION: 92 % | HEIGHT: 62 IN | BODY MASS INDEX: 21.53 KG/M2 | TEMPERATURE: 95 F | HEART RATE: 64 BPM | RESPIRATION RATE: 18 BRPM | WEIGHT: 117 LBS | DIASTOLIC BLOOD PRESSURE: 48 MMHG | SYSTOLIC BLOOD PRESSURE: 80 MMHG

## 2021-01-01 VITALS
HEART RATE: 82 BPM | DIASTOLIC BLOOD PRESSURE: 80 MMHG | RESPIRATION RATE: 12 BRPM | HEIGHT: 62 IN | WEIGHT: 120 LBS | SYSTOLIC BLOOD PRESSURE: 136 MMHG | OXYGEN SATURATION: 95 % | BODY MASS INDEX: 22.08 KG/M2

## 2021-01-01 VITALS
WEIGHT: 130 LBS | TEMPERATURE: 98.7 F | HEIGHT: 62 IN | BODY MASS INDEX: 23.92 KG/M2 | HEART RATE: 88 BPM | DIASTOLIC BLOOD PRESSURE: 78 MMHG | OXYGEN SATURATION: 95 % | SYSTOLIC BLOOD PRESSURE: 126 MMHG | RESPIRATION RATE: 18 BRPM

## 2021-01-01 VITALS
HEART RATE: 87 BPM | DIASTOLIC BLOOD PRESSURE: 78 MMHG | SYSTOLIC BLOOD PRESSURE: 144 MMHG | OXYGEN SATURATION: 96 % | HEIGHT: 62 IN | RESPIRATION RATE: 18 BRPM | SYSTOLIC BLOOD PRESSURE: 128 MMHG | WEIGHT: 130 LBS | DIASTOLIC BLOOD PRESSURE: 72 MMHG | BODY MASS INDEX: 21.71 KG/M2 | HEIGHT: 62 IN | RESPIRATION RATE: 16 BRPM | HEART RATE: 74 BPM | TEMPERATURE: 97.6 F | BODY MASS INDEX: 23.92 KG/M2 | OXYGEN SATURATION: 96 % | WEIGHT: 118 LBS

## 2021-01-01 VITALS
WEIGHT: 120 LBS | DIASTOLIC BLOOD PRESSURE: 80 MMHG | RESPIRATION RATE: 12 BRPM | OXYGEN SATURATION: 95 % | SYSTOLIC BLOOD PRESSURE: 136 MMHG | HEART RATE: 82 BPM | HEIGHT: 62 IN | BODY MASS INDEX: 22.08 KG/M2

## 2021-01-01 VITALS
WEIGHT: 127.87 LBS | DIASTOLIC BLOOD PRESSURE: 74 MMHG | SYSTOLIC BLOOD PRESSURE: 146 MMHG | BODY MASS INDEX: 23.53 KG/M2 | HEART RATE: 70 BPM | HEIGHT: 62 IN | OXYGEN SATURATION: 96 %

## 2021-01-01 DIAGNOSIS — N30.00 ACUTE CYSTITIS WITHOUT HEMATURIA: ICD-10-CM

## 2021-01-01 DIAGNOSIS — I10 ESSENTIAL HYPERTENSION: ICD-10-CM

## 2021-01-01 DIAGNOSIS — I50.22 CHRONIC SYSTOLIC CONGESTIVE HEART FAILURE (HCC): ICD-10-CM

## 2021-01-01 DIAGNOSIS — M05.79 RHEUMATOID ARTHRITIS INVOLVING MULTIPLE SITES WITH POSITIVE RHEUMATOID FACTOR (HCC): ICD-10-CM

## 2021-01-01 DIAGNOSIS — R19.8 PERFORATED ABDOMINAL VISCUS: ICD-10-CM

## 2021-01-01 DIAGNOSIS — M54.41 ACUTE LEFT-SIDED LOW BACK PAIN WITH RIGHT-SIDED SCIATICA: ICD-10-CM

## 2021-01-01 DIAGNOSIS — T14.8XXA FRACTURE: ICD-10-CM

## 2021-01-01 DIAGNOSIS — I35.0 NONRHEUMATIC AORTIC VALVE STENOSIS: ICD-10-CM

## 2021-01-01 DIAGNOSIS — I10 ESSENTIAL HYPERTENSION: Chronic | ICD-10-CM

## 2021-01-01 DIAGNOSIS — I42.0 DILATED CARDIOMYOPATHY (HCC): ICD-10-CM

## 2021-01-01 DIAGNOSIS — R07.9 CHEST PAIN IN ADULT: ICD-10-CM

## 2021-01-01 DIAGNOSIS — Z87.39 HX OF GOUT: ICD-10-CM

## 2021-01-01 DIAGNOSIS — I25.2 HISTORY OF MI (MYOCARDIAL INFARCTION): ICD-10-CM

## 2021-01-01 DIAGNOSIS — I25.10 CORONARY ARTERY DISEASE INVOLVING NATIVE CORONARY ARTERY OF NATIVE HEART WITHOUT ANGINA PECTORIS: Chronic | ICD-10-CM

## 2021-01-01 DIAGNOSIS — K21.9 GASTROESOPHAGEAL REFLUX DISEASE WITHOUT ESOPHAGITIS: Chronic | ICD-10-CM

## 2021-01-01 DIAGNOSIS — N18.31 STAGE 3A CHRONIC KIDNEY DISEASE: ICD-10-CM

## 2021-01-01 DIAGNOSIS — Z95.0 CARDIAC PACEMAKER IN SITU: ICD-10-CM

## 2021-01-01 DIAGNOSIS — I44.2 AV BLOCK, COMPLETE (HCC): ICD-10-CM

## 2021-01-01 DIAGNOSIS — E87.5 HYPERKALEMIA: ICD-10-CM

## 2021-01-01 DIAGNOSIS — S32.10XA CLOSED FRACTURE OF SACRUM, UNSPECIFIED PORTION OF SACRUM, INITIAL ENCOUNTER (HCC): ICD-10-CM

## 2021-01-01 DIAGNOSIS — I47.10 SVT (SUPRAVENTRICULAR TACHYCARDIA) (HCC): ICD-10-CM

## 2021-01-01 DIAGNOSIS — Z78.0 POST-MENOPAUSE: ICD-10-CM

## 2021-01-01 DIAGNOSIS — Z23 NEED FOR VACCINATION: ICD-10-CM

## 2021-01-01 DIAGNOSIS — R60.9 EDEMA, UNSPECIFIED TYPE: ICD-10-CM

## 2021-01-01 DIAGNOSIS — R10.84 GENERALIZED ABDOMINAL PAIN: ICD-10-CM

## 2021-01-01 DIAGNOSIS — N18.32 STAGE 3B CHRONIC KIDNEY DISEASE: ICD-10-CM

## 2021-01-01 DIAGNOSIS — D64.9 ANEMIA, UNSPECIFIED TYPE: ICD-10-CM

## 2021-01-01 DIAGNOSIS — R00.1 BRADYCARDIA: ICD-10-CM

## 2021-01-01 DIAGNOSIS — Z23 ENCOUNTER FOR VACCINATION: Primary | ICD-10-CM

## 2021-01-01 DIAGNOSIS — M81.0 AGE-RELATED OSTEOPOROSIS WITHOUT CURRENT PATHOLOGICAL FRACTURE: ICD-10-CM

## 2021-01-01 DIAGNOSIS — N17.9 ACUTE KIDNEY INJURY (HCC): ICD-10-CM

## 2021-01-01 DIAGNOSIS — J90 PLEURAL EFFUSION: ICD-10-CM

## 2021-01-01 DIAGNOSIS — Z79.890 HORMONE REPLACEMENT THERAPY (HRT): ICD-10-CM

## 2021-01-01 DIAGNOSIS — I34.0 MITRAL VALVE INSUFFICIENCY, UNSPECIFIED ETIOLOGY: ICD-10-CM

## 2021-01-01 DIAGNOSIS — Z79.899 HIGH RISK MEDICATION USE: ICD-10-CM

## 2021-01-01 DIAGNOSIS — R53.81 PHYSICAL DECONDITIONING: ICD-10-CM

## 2021-01-01 DIAGNOSIS — I25.10 CORONARY ARTERY DISEASE INVOLVING NATIVE CORONARY ARTERY OF NATIVE HEART WITHOUT ANGINA PECTORIS: ICD-10-CM

## 2021-01-01 DIAGNOSIS — R53.1 GENERALIZED WEAKNESS: ICD-10-CM

## 2021-01-01 DIAGNOSIS — R06.02 SHORTNESS OF BREATH: ICD-10-CM

## 2021-01-01 LAB
ALBUMIN SERPL BCP-MCNC: 2.5 G/DL (ref 3.2–4.9)
ALBUMIN SERPL BCP-MCNC: 3.9 G/DL (ref 3.2–4.9)
ALBUMIN/GLOB SERPL: 0.7 G/DL
ALP SERPL-CCNC: 110 U/L (ref 30–99)
ALP SERPL-CCNC: 49 U/L (ref 30–99)
ALT SERPL-CCNC: 13 U/L (ref 2–50)
ALT SERPL-CCNC: 17 U/L (ref 2–50)
ANION GAP SERPL CALC-SCNC: 14 MMOL/L (ref 7–16)
ANION GAP SERPL CALC-SCNC: 14 MMOL/L (ref 7–16)
ANION GAP SERPL CALC-SCNC: 15 MMOL/L (ref 7–16)
ANION GAP SERPL CALC-SCNC: 9 MMOL/L (ref 7–16)
APPEARANCE UR: ABNORMAL
AST SERPL-CCNC: 22 U/L (ref 12–45)
AST SERPL-CCNC: 37 U/L (ref 12–45)
BACTERIA #/AREA URNS HPF: ABNORMAL /HPF
BASOPHILS # BLD AUTO: 0 % (ref 0–1.8)
BASOPHILS # BLD: 0 K/UL (ref 0–0.12)
BILIRUB CONJ SERPL-MCNC: <0.2 MG/DL (ref 0.1–0.5)
BILIRUB INDIRECT SERPL-MCNC: NORMAL MG/DL (ref 0–1)
BILIRUB SERPL-MCNC: 0.6 MG/DL (ref 0.1–1.5)
BILIRUB SERPL-MCNC: 0.6 MG/DL (ref 0.1–1.5)
BILIRUB UR QL STRIP.AUTO: NEGATIVE
BUN SERPL-MCNC: 19 MG/DL (ref 8–22)
BUN SERPL-MCNC: 22 MG/DL (ref 8–22)
BUN SERPL-MCNC: 83 MG/DL (ref 8–22)
BUN SERPL-MCNC: 84 MG/DL (ref 8–22)
CALCIUM SERPL-MCNC: 9 MG/DL (ref 8.4–10.2)
CALCIUM SERPL-MCNC: 9.2 MG/DL (ref 8.4–10.2)
CALCIUM SERPL-MCNC: 9.2 MG/DL (ref 8.5–10.5)
CALCIUM SERPL-MCNC: 9.2 MG/DL (ref 8.5–10.5)
CAOX CRY #/AREA URNS HPF: ABNORMAL /HPF
CHLORIDE SERPL-SCNC: 100 MMOL/L (ref 96–112)
CHLORIDE SERPL-SCNC: 104 MMOL/L (ref 96–112)
CHLORIDE SERPL-SCNC: 108 MMOL/L (ref 96–112)
CHLORIDE SERPL-SCNC: 99 MMOL/L (ref 96–112)
CO2 SERPL-SCNC: 20 MMOL/L (ref 20–33)
CO2 SERPL-SCNC: 21 MMOL/L (ref 20–33)
CO2 SERPL-SCNC: 23 MMOL/L (ref 20–33)
CO2 SERPL-SCNC: 25 MMOL/L (ref 20–33)
COLOR UR: ABNORMAL
CREAT SERPL-MCNC: 1.08 MG/DL (ref 0.5–1.4)
CREAT SERPL-MCNC: 1.33 MG/DL (ref 0.5–1.4)
CREAT SERPL-MCNC: 4.25 MG/DL (ref 0.5–1.4)
CREAT SERPL-MCNC: 4.73 MG/DL (ref 0.5–1.4)
EKG IMPRESSION: NORMAL
EOSINOPHIL # BLD AUTO: 0 K/UL (ref 0–0.51)
EOSINOPHIL NFR BLD: 0 % (ref 0–6.9)
EPI CELLS #/AREA URNS HPF: ABNORMAL /HPF
ERYTHROCYTE [DISTWIDTH] IN BLOOD BY AUTOMATED COUNT: 49.9 FL (ref 35.9–50)
FASTING STATUS PATIENT QL REPORTED: NORMAL
GLOBULIN SER CALC-MCNC: 3.5 G/DL (ref 1.9–3.5)
GLUCOSE SERPL-MCNC: 107 MG/DL (ref 65–99)
GLUCOSE SERPL-MCNC: 133 MG/DL (ref 65–99)
GLUCOSE SERPL-MCNC: 92 MG/DL (ref 65–99)
GLUCOSE SERPL-MCNC: 96 MG/DL (ref 65–99)
GLUCOSE UR STRIP.AUTO-MCNC: NEGATIVE MG/DL
GRAN CASTS #/AREA URNS LPF: ABNORMAL /LPF
HCT VFR BLD AUTO: 43.7 % (ref 37–47)
HGB BLD-MCNC: 14.5 G/DL (ref 12–16)
HYALINE CASTS #/AREA URNS LPF: ABNORMAL /LPF
KETONES UR STRIP.AUTO-MCNC: ABNORMAL MG/DL
LEUKOCYTE ESTERASE UR QL STRIP.AUTO: NEGATIVE
LG PLATELETS BLD QL SMEAR: NORMAL
LIPASE SERPL-CCNC: 14 U/L (ref 7–58)
LV EJECT FRACT  99904: 65
LV EJECT FRACT MOD 2C 99903: 76.35
LV EJECT FRACT MOD 4C 99902: 62.18
LV EJECT FRACT MOD BP 99901: 70.16
LYMPHOCYTES # BLD AUTO: 1.14 K/UL (ref 1–4.8)
LYMPHOCYTES NFR BLD: 8 % (ref 22–41)
MANUAL DIFF BLD: NORMAL
MCH RBC QN AUTO: 31.3 PG (ref 27–33)
MCHC RBC AUTO-ENTMCNC: 33.2 G/DL (ref 33.6–35)
MCV RBC AUTO: 94.4 FL (ref 81.4–97.8)
MICRO URNS: ABNORMAL
MONOCYTES # BLD AUTO: 2 K/UL (ref 0–0.85)
MONOCYTES NFR BLD AUTO: 14 % (ref 0–13.4)
MUCOUS THREADS #/AREA URNS HPF: ABNORMAL /HPF
NEUTROPHILS # BLD AUTO: 11.15 K/UL (ref 2–7.15)
NEUTROPHILS NFR BLD: 69 % (ref 44–72)
NEUTS BAND NFR BLD MANUAL: 9 % (ref 0–10)
NITRITE UR QL STRIP.AUTO: POSITIVE
NRBC # BLD AUTO: 0 K/UL
NRBC BLD-RTO: 0 /100 WBC
PH UR STRIP.AUTO: 5 [PH] (ref 5–8)
PLATELET # BLD AUTO: 156 K/UL (ref 164–446)
PLATELET BLD QL SMEAR: NORMAL
PMV BLD AUTO: 10.2 FL (ref 9–12.9)
POTASSIUM SERPL-SCNC: 4.2 MMOL/L (ref 3.6–5.5)
POTASSIUM SERPL-SCNC: 4.5 MMOL/L (ref 3.6–5.5)
POTASSIUM SERPL-SCNC: 5.5 MMOL/L (ref 3.6–5.5)
POTASSIUM SERPL-SCNC: 6.2 MMOL/L (ref 3.6–5.5)
PROT SERPL-MCNC: 6 G/DL (ref 6–8.2)
PROT SERPL-MCNC: 7.3 G/DL (ref 6–8.2)
PROT UR QL STRIP: NEGATIVE MG/DL
RBC # BLD AUTO: 4.63 M/UL (ref 4.2–5.4)
RBC # URNS HPF: ABNORMAL /HPF
RBC BLD AUTO: NORMAL
RBC UR QL AUTO: NEGATIVE
SODIUM SERPL-SCNC: 135 MMOL/L (ref 135–145)
SODIUM SERPL-SCNC: 137 MMOL/L (ref 135–145)
SODIUM SERPL-SCNC: 138 MMOL/L (ref 135–145)
SODIUM SERPL-SCNC: 142 MMOL/L (ref 135–145)
SP GR UR STRIP.AUTO: 1.02
TOXIC GRANULES BLD QL SMEAR: SLIGHT
UNIDENT CRYS URNS QL MICRO: ABNORMAL /HPF
WBC # BLD AUTO: 14.3 K/UL (ref 4.8–10.8)
WBC #/AREA URNS HPF: ABNORMAL /HPF

## 2021-01-01 PROCEDURE — 77080 DXA BONE DENSITY AXIAL: CPT

## 2021-01-01 PROCEDURE — 99223 1ST HOSP IP/OBS HIGH 75: CPT | Mod: AI | Performed by: HOSPITALIST

## 2021-01-01 PROCEDURE — G0008 ADMIN INFLUENZA VIRUS VAC: HCPCS | Performed by: FAMILY MEDICINE

## 2021-01-01 PROCEDURE — 700102 HCHG RX REV CODE 250 W/ 637 OVERRIDE(OP): Performed by: EMERGENCY MEDICINE

## 2021-01-01 PROCEDURE — 80048 BASIC METABOLIC PNL TOTAL CA: CPT

## 2021-01-01 PROCEDURE — 700101 HCHG RX REV CODE 250: Performed by: EMERGENCY MEDICINE

## 2021-01-01 PROCEDURE — 83735 ASSAY OF MAGNESIUM: CPT

## 2021-01-01 PROCEDURE — 99285 EMERGENCY DEPT VISIT HI MDM: CPT

## 2021-01-01 PROCEDURE — 36415 COLL VENOUS BLD VENIPUNCTURE: CPT

## 2021-01-01 PROCEDURE — 99214 OFFICE O/P EST MOD 30 MIN: CPT | Performed by: INTERNAL MEDICINE

## 2021-01-01 PROCEDURE — 90662 IIV NO PRSV INCREASED AG IM: CPT | Performed by: FAMILY MEDICINE

## 2021-01-01 PROCEDURE — 80053 COMPREHEN METABOLIC PANEL: CPT | Mod: 91

## 2021-01-01 PROCEDURE — 99213 OFFICE O/P EST LOW 20 MIN: CPT | Performed by: FAMILY MEDICINE

## 2021-01-01 PROCEDURE — 91300 PFIZER SARS-COV-2 VACCINE: CPT

## 2021-01-01 PROCEDURE — 93280 PM DEVICE PROGR EVAL DUAL: CPT | Performed by: NURSE PRACTITIONER

## 2021-01-01 PROCEDURE — 80076 HEPATIC FUNCTION PANEL: CPT

## 2021-01-01 PROCEDURE — 99214 OFFICE O/P EST MOD 30 MIN: CPT | Mod: 25 | Performed by: FAMILY MEDICINE

## 2021-01-01 PROCEDURE — 700105 HCHG RX REV CODE 258: Performed by: EMERGENCY MEDICINE

## 2021-01-01 PROCEDURE — 96375 TX/PRO/DX INJ NEW DRUG ADDON: CPT

## 2021-01-01 PROCEDURE — 700105 HCHG RX REV CODE 258: Performed by: HOSPITALIST

## 2021-01-01 PROCEDURE — 93306 TTE W/DOPPLER COMPLETE: CPT

## 2021-01-01 PROCEDURE — 93306 TTE W/DOPPLER COMPLETE: CPT | Mod: 26 | Performed by: INTERNAL MEDICINE

## 2021-01-01 PROCEDURE — 0002A PFIZER SARS-COV-2 VACCINE: CPT

## 2021-01-01 PROCEDURE — 770020 HCHG ROOM/CARE - TELE (206)

## 2021-01-01 PROCEDURE — 700111 HCHG RX REV CODE 636 W/ 250 OVERRIDE (IP): Performed by: EMERGENCY MEDICINE

## 2021-01-01 PROCEDURE — 85007 BL SMEAR W/DIFF WBC COUNT: CPT | Mod: 91

## 2021-01-01 PROCEDURE — 93005 ELECTROCARDIOGRAM TRACING: CPT | Performed by: EMERGENCY MEDICINE

## 2021-01-01 PROCEDURE — 99214 OFFICE O/P EST MOD 30 MIN: CPT | Mod: 25 | Performed by: INTERNAL MEDICINE

## 2021-01-01 PROCEDURE — 96365 THER/PROPH/DIAG IV INF INIT: CPT

## 2021-01-01 PROCEDURE — 700111 HCHG RX REV CODE 636 W/ 250 OVERRIDE (IP): Performed by: HOSPITALIST

## 2021-01-01 PROCEDURE — 83690 ASSAY OF LIPASE: CPT

## 2021-01-01 PROCEDURE — 0001A PFIZER SARS-COV-2 VACCINE: CPT

## 2021-01-01 PROCEDURE — 80048 BASIC METABOLIC PNL TOTAL CA: CPT | Mod: 91

## 2021-01-01 PROCEDURE — 81001 URINALYSIS AUTO W/SCOPE: CPT

## 2021-01-01 PROCEDURE — 85027 COMPLETE CBC AUTOMATED: CPT | Mod: 91

## 2021-01-01 PROCEDURE — 72131 CT LUMBAR SPINE W/O DYE: CPT | Mod: ME

## 2021-01-01 RX ORDER — ESTRADIOL 0.5 MG/1
0.5 TABLET ORAL
Qty: 90 TABLET | Refills: 3 | Status: SHIPPED | OUTPATIENT
Start: 2021-01-01

## 2021-01-01 RX ORDER — FUROSEMIDE 20 MG/1
20 TABLET ORAL
Qty: 90 TABLET | Refills: 3 | Status: SHIPPED | OUTPATIENT
Start: 2021-01-01

## 2021-01-01 RX ORDER — ACETAMINOPHEN 500 MG
500-1000 TABLET ORAL EVERY 6 HOURS PRN
COMMUNITY

## 2021-01-01 RX ORDER — BISACODYL 10 MG
10 SUPPOSITORY, RECTAL RECTAL
Status: DISCONTINUED | OUTPATIENT
Start: 2021-01-01 | End: 2022-01-01

## 2021-01-01 RX ORDER — ACETAMINOPHEN 325 MG/1
650 TABLET ORAL EVERY 6 HOURS PRN
Status: DISCONTINUED | OUTPATIENT
Start: 2021-01-01 | End: 2022-01-01

## 2021-01-01 RX ORDER — ONDANSETRON 2 MG/ML
4 INJECTION INTRAMUSCULAR; INTRAVENOUS EVERY 4 HOURS PRN
Status: DISCONTINUED | OUTPATIENT
Start: 2021-01-01 | End: 2022-01-01

## 2021-01-01 RX ORDER — POTASSIUM CHLORIDE 750 MG/1
TABLET, FILM COATED, EXTENDED RELEASE ORAL
Qty: 90 TABLET | Refills: 3 | Status: SHIPPED | OUTPATIENT
Start: 2021-01-01

## 2021-01-01 RX ORDER — POTASSIUM CHLORIDE 750 MG/1
TABLET, FILM COATED, EXTENDED RELEASE ORAL
Qty: 90 TABLET | Refills: 3 | Status: SHIPPED | OUTPATIENT
Start: 2021-01-01 | End: 2021-01-01 | Stop reason: SDUPTHER

## 2021-01-01 RX ORDER — POLYETHYLENE GLYCOL 3350 17 G/17G
1 POWDER, FOR SOLUTION ORAL
Status: DISCONTINUED | OUTPATIENT
Start: 2021-01-01 | End: 2022-01-01

## 2021-01-01 RX ORDER — OMEPRAZOLE 20 MG/1
20 CAPSULE, DELAYED RELEASE ORAL DAILY
Status: DISCONTINUED | OUTPATIENT
Start: 2022-01-01 | End: 2022-01-01

## 2021-01-01 RX ORDER — DEXTROSE MONOHYDRATE 25 G/50ML
50 INJECTION, SOLUTION INTRAVENOUS ONCE
Status: COMPLETED | OUTPATIENT
Start: 2021-01-01 | End: 2021-01-01

## 2021-01-01 RX ORDER — ONDANSETRON 4 MG/1
4 TABLET, ORALLY DISINTEGRATING ORAL ONCE
Status: COMPLETED | OUTPATIENT
Start: 2021-01-01 | End: 2021-01-01

## 2021-01-01 RX ORDER — SODIUM CHLORIDE 9 MG/ML
INJECTION, SOLUTION INTRAVENOUS CONTINUOUS
Status: DISCONTINUED | OUTPATIENT
Start: 2021-01-01 | End: 2022-01-01

## 2021-01-01 RX ORDER — OXYCODONE HYDROCHLORIDE AND ACETAMINOPHEN 5; 325 MG/1; MG/1
1 TABLET ORAL EVERY 6 HOURS PRN
Qty: 60 TABLET | Refills: 0 | Status: SHIPPED | OUTPATIENT
Start: 2021-01-01 | End: 2021-01-01 | Stop reason: SDUPTHER

## 2021-01-01 RX ORDER — OXYCODONE HYDROCHLORIDE AND ACETAMINOPHEN 5; 325 MG/1; MG/1
1 TABLET ORAL EVERY 6 HOURS PRN
Qty: 60 TABLET | Refills: 0 | Status: SHIPPED | OUTPATIENT
Start: 2021-01-01 | End: 2022-01-13

## 2021-01-01 RX ORDER — LISINOPRIL 10 MG/1
TABLET ORAL
Qty: 90 TABLET | Refills: 3 | Status: SHIPPED | OUTPATIENT
Start: 2021-01-01 | End: 2021-01-01 | Stop reason: SDUPTHER

## 2021-01-01 RX ORDER — SODIUM CHLORIDE 9 MG/ML
1000 INJECTION, SOLUTION INTRAVENOUS ONCE
Status: COMPLETED | OUTPATIENT
Start: 2021-01-01 | End: 2021-01-01

## 2021-01-01 RX ORDER — FUROSEMIDE 20 MG/1
TABLET ORAL
Qty: 90 TABLET | Refills: 3 | Status: SHIPPED | OUTPATIENT
Start: 2021-01-01 | End: 2021-01-01 | Stop reason: SDUPTHER

## 2021-01-01 RX ORDER — HEPARIN SODIUM 5000 [USP'U]/ML
5000 INJECTION, SOLUTION INTRAVENOUS; SUBCUTANEOUS EVERY 8 HOURS
Status: DISCONTINUED | OUTPATIENT
Start: 2021-01-01 | End: 2022-01-01

## 2021-01-01 RX ORDER — ONDANSETRON 4 MG/1
4 TABLET, ORALLY DISINTEGRATING ORAL EVERY 4 HOURS PRN
Status: DISCONTINUED | OUTPATIENT
Start: 2021-01-01 | End: 2022-01-01

## 2021-01-01 RX ORDER — CARVEDILOL 6.25 MG/1
12.5 TABLET ORAL 2 TIMES DAILY WITH MEALS
Status: DISCONTINUED | OUTPATIENT
Start: 2021-01-01 | End: 2022-01-01

## 2021-01-01 RX ORDER — CARVEDILOL 12.5 MG/1
12.5 TABLET ORAL 2 TIMES DAILY WITH MEALS
Qty: 180 TABLET | Refills: 3 | Status: SHIPPED | OUTPATIENT
Start: 2021-01-01 | End: 2021-01-01 | Stop reason: SDUPTHER

## 2021-01-01 RX ORDER — CARVEDILOL 12.5 MG/1
12.5 TABLET ORAL 2 TIMES DAILY WITH MEALS
Qty: 180 TABLET | Refills: 3 | Status: SHIPPED | OUTPATIENT
Start: 2021-01-01

## 2021-01-01 RX ORDER — HYDRALAZINE HYDROCHLORIDE 20 MG/ML
20 INJECTION INTRAMUSCULAR; INTRAVENOUS EVERY 6 HOURS PRN
Status: DISCONTINUED | OUTPATIENT
Start: 2021-01-01 | End: 2022-01-01

## 2021-01-01 RX ORDER — AMOXICILLIN 250 MG
2 CAPSULE ORAL 2 TIMES DAILY
Status: DISCONTINUED | OUTPATIENT
Start: 2021-01-01 | End: 2022-01-01

## 2021-01-01 RX ORDER — LISINOPRIL 10 MG/1
TABLET ORAL
Qty: 90 TABLET | Refills: 3 | Status: SHIPPED | OUTPATIENT
Start: 2021-01-01

## 2021-01-01 RX ORDER — LABETALOL HYDROCHLORIDE 5 MG/ML
10 INJECTION, SOLUTION INTRAVENOUS EVERY 4 HOURS PRN
Status: DISCONTINUED | OUTPATIENT
Start: 2021-01-01 | End: 2022-01-01

## 2021-01-01 RX ORDER — NITROGLYCERIN 0.4 MG/1
0.4 TABLET SUBLINGUAL
Status: DISCONTINUED | OUTPATIENT
Start: 2021-01-01 | End: 2022-01-01

## 2021-01-01 RX ORDER — CALCIUM GLUCONATE 20 MG/ML
2 INJECTION, SOLUTION INTRAVENOUS ONCE
Status: COMPLETED | OUTPATIENT
Start: 2021-01-01 | End: 2021-01-01

## 2021-01-01 RX ADMIN — DEXTROSE MONOHYDRATE 50 ML: 25 INJECTION, SOLUTION INTRAVENOUS at 17:42

## 2021-01-01 RX ADMIN — SODIUM CHLORIDE: 9 INJECTION, SOLUTION INTRAVENOUS at 20:13

## 2021-01-01 RX ADMIN — SODIUM CHLORIDE 1000 ML: 9 INJECTION, SOLUTION INTRAVENOUS at 16:47

## 2021-01-01 RX ADMIN — INSULIN HUMAN 5.5 UNITS: 100 INJECTION, SOLUTION PARENTERAL at 17:43

## 2021-01-01 RX ADMIN — ONDANSETRON 4 MG: 4 TABLET, ORALLY DISINTEGRATING ORAL at 15:45

## 2021-01-01 RX ADMIN — CALCIUM GLUCONATE 2 G: 20 INJECTION, SOLUTION INTRAVENOUS at 17:40

## 2021-01-01 RX ADMIN — SODIUM BICARBONATE 50 MEQ: 84 INJECTION INTRAVENOUS at 17:41

## 2021-01-01 RX ADMIN — HEPARIN SODIUM 5000 UNITS: 5000 INJECTION, SOLUTION INTRAVENOUS; SUBCUTANEOUS at 20:14

## 2021-01-01 SDOH — ECONOMIC STABILITY: FOOD INSECURITY: WITHIN THE PAST 12 MONTHS, YOU WORRIED THAT YOUR FOOD WOULD RUN OUT BEFORE YOU GOT MONEY TO BUY MORE.: NEVER TRUE

## 2021-01-01 SDOH — HEALTH STABILITY: MENTAL HEALTH
STRESS IS WHEN SOMEONE FEELS TENSE, NERVOUS, ANXIOUS, OR CAN'T SLEEP AT NIGHT BECAUSE THEIR MIND IS TROUBLED. HOW STRESSED ARE YOU?: PATIENT DECLINED

## 2021-01-01 SDOH — ECONOMIC STABILITY: TRANSPORTATION INSECURITY
IN THE PAST 12 MONTHS, HAS THE LACK OF TRANSPORTATION KEPT YOU FROM MEDICAL APPOINTMENTS OR FROM GETTING MEDICATIONS?: NO

## 2021-01-01 SDOH — ECONOMIC STABILITY: FOOD INSECURITY: WITHIN THE PAST 12 MONTHS, THE FOOD YOU BOUGHT JUST DIDN'T LAST AND YOU DIDN'T HAVE MONEY TO GET MORE.: NEVER TRUE

## 2021-01-01 SDOH — HEALTH STABILITY: PHYSICAL HEALTH: ON AVERAGE, HOW MANY MINUTES DO YOU ENGAGE IN EXERCISE AT THIS LEVEL?: 0 MIN

## 2021-01-01 SDOH — ECONOMIC STABILITY: INCOME INSECURITY: IN THE LAST 12 MONTHS, WAS THERE A TIME WHEN YOU WERE NOT ABLE TO PAY THE MORTGAGE OR RENT ON TIME?: NO

## 2021-01-01 SDOH — ECONOMIC STABILITY: HOUSING INSECURITY
IN THE LAST 12 MONTHS, WAS THERE A TIME WHEN YOU DID NOT HAVE A STEADY PLACE TO SLEEP OR SLEPT IN A SHELTER (INCLUDING NOW)?: NO

## 2021-01-01 SDOH — ECONOMIC STABILITY: TRANSPORTATION INSECURITY
IN THE PAST 12 MONTHS, HAS LACK OF RELIABLE TRANSPORTATION KEPT YOU FROM MEDICAL APPOINTMENTS, MEETINGS, WORK OR FROM GETTING THINGS NEEDED FOR DAILY LIVING?: NO

## 2021-01-01 SDOH — ECONOMIC STABILITY: TRANSPORTATION INSECURITY
IN THE PAST 12 MONTHS, HAS LACK OF TRANSPORTATION KEPT YOU FROM MEETINGS, WORK, OR FROM GETTING THINGS NEEDED FOR DAILY LIVING?: NO

## 2021-01-01 SDOH — HEALTH STABILITY: PHYSICAL HEALTH: ON AVERAGE, HOW MANY DAYS PER WEEK DO YOU ENGAGE IN MODERATE TO STRENUOUS EXERCISE (LIKE A BRISK WALK)?: 0 DAYS

## 2021-01-01 SDOH — ECONOMIC STABILITY: HOUSING INSECURITY

## 2021-01-01 ASSESSMENT — ENCOUNTER SYMPTOMS
BRUISES/BLEEDS EASILY: 0
FEVER: 0
PALPITATIONS: 0
SHORTNESS OF BREATH: 0
COUGH: 0
CLAUDICATION: 0
ORTHOPNEA: 0
CONSTITUTIONAL NEGATIVE: 1
PND: 0
NEUROLOGICAL NEGATIVE: 1
FEVER: 0
WEAKNESS: 0
CHILLS: 0
DIZZINESS: 0
WHEEZING: 0
CARDIOVASCULAR NEGATIVE: 1
CONSTITUTIONAL NEGATIVE: 1
SHORTNESS OF BREATH: 0
COUGH: 0
GASTROINTESTINAL NEGATIVE: 1
CHILLS: 0
PND: 0
STRIDOR: 0
ABDOMINAL PAIN: 0
SHORTNESS OF BREATH: 0
BRUISES/BLEEDS EASILY: 0
NEUROLOGICAL NEGATIVE: 1
SORE THROAT: 0
FLANK PAIN: 0
HEMOPTYSIS: 0
COUGH: 0
STRIDOR: 0
HEMOPTYSIS: 0
MUSCULOSKELETAL NEGATIVE: 1
NERVOUS/ANXIOUS: 0
CHILLS: 0
BACK PAIN: 1
FEVER: 0
GASTROINTESTINAL NEGATIVE: 1
WHEEZING: 0
PALPITATIONS: 0
EYE REDNESS: 0
LOSS OF CONSCIOUSNESS: 0
LOSS OF CONSCIOUSNESS: 0
VOMITING: 0
CLAUDICATION: 0
ORTHOPNEA: 0
DIZZINESS: 0
EYE DISCHARGE: 0
SPUTUM PRODUCTION: 0
CARDIOVASCULAR NEGATIVE: 1
BRUISES/BLEEDS EASILY: 0
SORE THROAT: 0
STRIDOR: 0
WEAKNESS: 0
EYES NEGATIVE: 1
RESPIRATORY NEGATIVE: 1
RESPIRATORY NEGATIVE: 1
MUSCULOSKELETAL NEGATIVE: 1
EYES NEGATIVE: 1
FALLS: 1
FOCAL WEAKNESS: 0
SPUTUM PRODUCTION: 0
MYALGIAS: 0

## 2021-01-01 ASSESSMENT — SOCIAL DETERMINANTS OF HEALTH (SDOH)
HOW OFTEN DO YOU HAVE SIX OR MORE DRINKS ON ONE OCCASION: NEVER
HOW OFTEN DO YOU ATTEND CHURCH OR RELIGIOUS SERVICES?: NEVER
HOW OFTEN DO YOU ATTENT MEETINGS OF THE CLUB OR ORGANIZATION YOU BELONG TO?: NEVER
HOW OFTEN DO YOU ATTEND CHURCH OR RELIGIOUS SERVICES?: NEVER
DO YOU BELONG TO ANY CLUBS OR ORGANIZATIONS SUCH AS CHURCH GROUPS UNIONS, FRATERNAL OR ATHLETIC GROUPS, OR SCHOOL GROUPS?: NO
DO YOU BELONG TO ANY CLUBS OR ORGANIZATIONS SUCH AS CHURCH GROUPS UNIONS, FRATERNAL OR ATHLETIC GROUPS, OR SCHOOL GROUPS?: NO
IN A TYPICAL WEEK, HOW MANY TIMES DO YOU TALK ON THE PHONE WITH FAMILY, FRIENDS, OR NEIGHBORS?: MORE THAN THREE TIMES A WEEK
HOW OFTEN DO YOU GET TOGETHER WITH FRIENDS OR RELATIVES?: ONCE A WEEK
HOW OFTEN DO YOU GET TOGETHER WITH FRIENDS OR RELATIVES?: ONCE A WEEK
HOW OFTEN DO YOU ATTENT MEETINGS OF THE CLUB OR ORGANIZATION YOU BELONG TO?: NEVER
HOW OFTEN DO YOU HAVE A DRINK CONTAINING ALCOHOL: NEVER
IN A TYPICAL WEEK, HOW MANY TIMES DO YOU TALK ON THE PHONE WITH FAMILY, FRIENDS, OR NEIGHBORS?: MORE THAN THREE TIMES A WEEK
WITHIN THE PAST 12 MONTHS, YOU WORRIED THAT YOUR FOOD WOULD RUN OUT BEFORE YOU GOT THE MONEY TO BUY MORE: NEVER TRUE

## 2021-01-01 ASSESSMENT — FIBROSIS 4 INDEX
FIB4 SCORE: 5
FIB4 SCORE: 5

## 2021-01-01 ASSESSMENT — LIFESTYLE VARIABLES
HOW OFTEN DO YOU HAVE A DRINK CONTAINING ALCOHOL: NEVER
HOW OFTEN DO YOU HAVE SIX OR MORE DRINKS ON ONE OCCASION: NEVER

## 2021-01-01 ASSESSMENT — PAIN DESCRIPTION - PAIN TYPE: TYPE: ACUTE PAIN

## 2021-01-01 ASSESSMENT — PAIN SCALES - GENERAL
PAINLEVEL: 5=MODERATE PAIN
PAINLEVEL: NO PAIN

## 2021-01-01 ASSESSMENT — PATIENT HEALTH QUESTIONNAIRE - PHQ9: CLINICAL INTERPRETATION OF PHQ2 SCORE: 0

## 2021-01-17 NOTE — TELEPHONE ENCOUNTER
How would the patient prefer to be contacted with a response: [employer] message    2. SPECIFIC Action To Be Taken: Orders pending, please sign.    3. Diagnosis/Clinical Reason for Request: Bone Density    4. Specialty & Provider Name/Lab/Imaging Location: [facility]    5. Is appointment scheduled for requested order/referral: no    Patient was not informed they will receive a return phone call from the office ONLY if there are any questions before processing their request. Advised to call back if they haven't received a call from the referral department in 5 days.

## 2021-01-26 NOTE — TELEPHONE ENCOUNTER
WILBER    NM: Vasu   HOSP: [facility]   PH: [phone]   PT NM: Jess Colón   : 34   RE: Need to confirm potassium Rx is  tablet or capsule.     --------------------------------------  Message History  Account: [id number]  Taken:  2021  1:20p   Serial#: [id number]

## 2021-04-15 NOTE — ASSESSMENT & PLAN NOTE
She has osteoporosis for which she has been on Fosamax for quite a long time.  She thinks it may be well over 5 years.  She will double check on that.  She recently had a DEXA scan that was not significantly different from previous.

## 2021-04-15 NOTE — ASSESSMENT & PLAN NOTE
She has a cardiac pacemaker in place because of AV block.  She denies lightheadedness or near syncope.

## 2021-04-15 NOTE — PROGRESS NOTES
Subjective:     Chief Complaint   Patient presents with   • Lab Results   • Evaluation Of Medication Change     Shira Colón is a 86 y.o. female here today for Follow-up of coronary artery disease and hypertension and chronic renal insufficiency and rheumatoid arthritis and osteoporosis.    CAD (coronary artery disease)  She has a history of coronary artery disease but she denies any recent chest pain.    Gastroesophageal reflux disease without esophagitis  She has gastroesophageal reflux for which she is on a PPI.  Symptoms are minimal.    Essential hypertension  She has hypertension for which she is on furosemide and lisinopril.  She denies lightheadedness.  When she first came to our office today her blood pressure was a little elevated but repeat check was much improved.    Nonrheumatic aortic valve stenosis  She has a history of aortic valve stenosis that is being followed by cardiology.    Cardiac pacemaker in situ  She has a cardiac pacemaker in place because of AV block.  She denies lightheadedness or near syncope.    Chronic kidney disease (CKD), stage III (moderate)  She has chronic renal insufficiency with most recent GFR 48.  As noted, she is on Lasix.    RA (rheumatoid arthritis)  She reports that her rheumatoid arthritis is fairly stable.    Osteoporosis  She has osteoporosis for which she has been on Fosamax for quite a long time.  She thinks it may be well over 5 years.  She will double check on that.  She recently had a DEXA scan that was not significantly different from previous.       Diagnoses of Essential hypertension, Coronary artery disease involving native coronary artery of native heart without angina pectoris, Cardiac pacemaker in situ, Gastroesophageal reflux disease without esophagitis, Stage 3a chronic kidney disease, Age-related osteoporosis without current pathological fracture, Nonrheumatic aortic valve stenosis, and Rheumatoid arthritis involving multiple sites with positive  rheumatoid factor (HCC) were pertinent to this visit.    Allergies: Methotrexate, Remicade [infliximab injection], Sulfites, and Other environmental  Current medicines (including changes today)  Current Outpatient Medications   Medication Sig Dispense Refill   • potassium chloride ER (KLOR-CON) 10 MEQ tablet TAKE 1 TABLET DAILY 90 Tab 3   • estradiol (ESTRACE) 0.5 MG tablet Take 1 Tab by mouth every day. 90 Tab 3   • Epinastine HCl 0.05 % Solution as needed.     • Naproxen Sodium (ALEVE) 220 MG Cap Take  by mouth.     • Carbomer Gel Base (HYDROGEL) Gel by Ophthalmic route as needed.     • carvedilol (COREG) 12.5 MG Tab TAKE 1 TABLET TWICE A DAY WITH MEALS 180 Tab 3   • furosemide (LASIX) 20 MG Tab TAKE 1 TABLET DAILY 90 Tab 3   • lisinopril (PRINIVIL) 10 MG Tab TAKE 1 TABLET DAILY 90 Tab 3   • omeprazole (PRILOSEC) 20 MG delayed-release capsule Take 20 mg by mouth every day.     • predniSONE (DELTASONE) 10 MG Tab      • RESTASIS 0.05 % ophthalmic emulsion INT 1 GTT IN OU BID     • allopurinol (ZYLOPRIM) 100 MG Tab Take 100 mg by mouth every day.     • tocilizumab (ACTEMRA) 400 MG/20ML Solution 400 mg by Intravenous route Q30 DAYS.     • Polyvinyl Alcohol-Povidone (REFRESH OP) 2 Drops by Ophthalmic route 2 Times a Day.     • nitroglycerin (NITROSTAT) 0.4 MG SUBL Place 1 Tab under tongue as needed for Chest Pain. 25 Tab 11   • Calcium Carbonate-Vitamin D (CALTRATE 600+D PO) Take 1 Tab by mouth 2 Times a Day.     • Multiple Vitamins-Minerals (CENTRUM SILVER PO) Take 1 Tab by mouth every day.     • alendronate (FOSAMAX) 70 MG Tab Take 70 mg by mouth every 7 days.       No current facility-administered medications for this visit.       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  Health Maintenance:Annual wellness evaluation at next office visit.  ROS    Patient denies significant change in strength, weight or appetite.  No significant lightheadedness or headaches.  No change in vision, hearing, or swallowing.  No new dyspnea, coughing, chest pain, or palpitations.  No indigestion, abdominal pain, or change in bowel habits.  No change in urinating.  No new ankle swelling.       Objective:     PE:  /78 (BP Location: Right arm, Patient Position: Sitting, BP Cuff Size: Adult)   Pulse 88   Temp 37.1 °C (98.7 °F)   Resp 18   Ht 1.575 m (5' 2\")   Wt 59 kg (130 lb)   SpO2 95%   BMI 23.78 kg/m²   Neck is supple without significant lymphadenopathy or masses.  Lungs are clear with normal breath sounds without wheezes or rales .  Cardiovascular: peripheral circulation is satisfactory, heart sounds are unchanged and unremarkable With a grade 3/6 harsh fairly short systolic murmur loudest at the upper left sternal border..  Abdomen is soft, without masses or tenderness, with normal bowel sounds.  Extremities are without significant edema, cyanosis or deformity.      Assessment and Plan:   The following treatment plan was discussed  1. Essential hypertension  Basic Metabolic Panel    No medication change at this time.  Follow low-salt diet.  Check blood pressures at home and report them to us.   2. Coronary artery disease involving native coronary artery of native heart without angina pectoris      Go to emergency room with any chest pain.  Follow-up regularly with cardiology.   3. Cardiac pacemaker in situ      Regular pacemaker checks.   4. Gastroesophageal reflux disease without esophagitis      Continue Prilosec " as needed.  Report any significant exacerbation.   5. Stage 3a chronic kidney disease      Remain well-hydrated.  Avoid nephrotoxic medications.   6. Age-related osteoporosis without current pathological fracture      If she has been on Fosamax for 5 years, she could discontinue that.  Continue vitamin D and exercise as tolerated for now.   7. Nonrheumatic aortic valve stenosis      Follow-up regularly with cardiology.   8. Rheumatoid arthritis involving multiple sites with positive rheumatoid factor (HCC)      Follow-up regularly with Dr. Robb.       Followup: Return in about 4 months (around 8/14/2021) for Long.

## 2021-04-15 NOTE — ASSESSMENT & PLAN NOTE
She has hypertension for which she is on furosemide and lisinopril.  She denies lightheadedness.  When she first came to our office today her blood pressure was a little elevated but repeat check was much improved.

## 2021-05-10 NOTE — PROGRESS NOTES
Device is working normally. 15 mode switching episodes (all <6 seconds, <0.1% of total time).  Normal sensing of RA lead; unable to measure R waves. Stable capture of RA and RV leads; stable impedances. Battery longevity is 7 years.  No changes are made today.    She does see Dr. Tatum next week.    FU in 6 months for next PM check with me.

## 2021-05-20 NOTE — PROGRESS NOTES
Chief Complaint   Patient presents with   • AV Block Complete       Subjective:   Shira Colón is a 85 y.o. female who presents today as a follow-up for her aortic stenosis high blood pressure heart failure with reduced ejection fraction with normalized function as well as her pacemaker.    Since she was last seen she had a repeat echocardiogram that confirmed low-flow low gradient aortic stenosis.  She is having no chest pain.  She is otherwise doing well.  Blood pressures controlled.    Past Medical History:   Diagnosis Date   • Abnormal liver function test    • Anesthesia     PONV   • Arthritis     Rheumatoid Arthritis x 30 years   • Bilateral dry eyes 5/30/2018   • Bronchitis      10-15 years ago; had it 2/2020   • Cough 2/19/2020   • Dental disorder     Full dentures   • Emotional lability 6/8/2020   • Fall from ground level 8/19/2020   • Heart burn    • Heart valve disease     mitral valve prolapse   • Hot flushes, perimenopausal 6/8/2020   • Hypertension    • Idiopathic gout of ankle 5/30/2018   • Indigestion    • Myocardial infarct (HCC)     • Pacemaker    • Pain      feet secondary to RA=2/10   • Palpitations    • Pneumonia 1964   • Rheumatoid arthritis(714.0)    • Snoring    • SVT (supraventricular tachycardia) ([facility])     SVT/A-Flutter   • Unspecified cataract     Surgery L eye     Past Surgical History:   Procedure Laterality Date   • FINGER ORIF Right 6/12/2020    Procedure: ORIF, FINGER-LONG FINGER MERACARPAL SHAFT FRACTURE - VS;  Surgeon: Jens Maharaj M.D.;  Location: SURGERY [facility];  Service: Orthopedics   • PERCUTANEOUS PINNING Right 6/12/2020    Procedure: PINNING, FRACTURE, PERCUTANEOUS-;  Surgeon: Jens Maharaj M.D.;  Location: SURGERY [facility];  Service: Orthopedics   • PACEMAKER INSERTION  December 2016    Medtronic Adapta [id number] implanted by Dr. Ang.   • RECOVERY  5/19/2016    Procedure: CATH LAB [facility] WITH A SHOT OF THE AORTIC ROOT DR. NAYAK;  Surgeon:  Recoveryonly Surgery;  Location: SURGERY PRE-POST PROC UNIT [facility];  Service:    • RECOVERY  4/27/2016    Procedure: CATH LAB MELVIN WITH ANESTHESIA ;  Surgeon: Recoveryonsherrell Surgery;  Location: SURGERY PRE-POST PROC UNIT [facility];  Service:    • RECOVERY  11/4/2014    Performed by Cath-Recovery Surgery at SURGERY SAME DAY [facility] ORS   • GASTROSCOPY WITH BIOPSY  5/7/2014    Performed by Darin Barros M.D. at SURGERY [facility]   • COLONOSCOPY WITH CLIPPING  5/7/2014    Performed by Darin Barros M.D. at [facility] ORS   • BREAST BIOPSY     • HYSTERECTOMY, VAGINAL     • RECTOCELE REPAIR     • TONSILLECTOMY       Family History   Problem Relation Age of Onset   • Heart Disease Brother      Social History     Socioeconomic History   • Marital status:      Spouse name: Not on file   • Number of children: Not on file   • Years of education: Not on file   • Highest education level: Not on file   Occupational History   • Not on file   Tobacco Use   • Smoking status: Never Smoker   • Smokeless tobacco: Never Used   Vaping Use   • Vaping Use: Never used   Substance and Sexual Activity   • Alcohol use: Yes     Comment: 1-2 drinks per year   • Drug use: No   • Sexual activity: Never   Other Topics Concern   • Not on file   Social History Narrative   • Not on file     Social Determinants of Health     Financial Resource Strain:    • Difficulty of Paying Living Expenses:    Food Insecurity:    • Worried About Running Out of Food in the Last Year:    • Ran Out of Food in the Last Year:    Transportation Needs:    • Lack of Transportation (Medical):    • Lack of Transportation (Non-Medical):    Physical Activity:    • Days of Exercise per Week:    • Minutes of Exercise per Session:    Stress:    • Feeling of Stress :    Social Connections:    • Frequency of Communication with Friends and Family:    • Frequency of Social Gatherings with Friends and Family:    • Attends Confucianism Services:    •  "Active Member of Clubs or Organizations:    • Attends Club or Organization Meetings:    • Marital Status:    Intimate Partner Violence:    • Fear of Current or Ex-Partner:    • Emotionally Abused:    • Physically Abused:    • Sexually Abused:      Allergies   Allergen Reactions   • Methotrexate      Pt. States it effects her liver.   • Remicade [Infliximab Injection]      Shaking/hot-flashes   • Sulfites Diarrhea   • Other Environmental Runny Nose and Itching     Plants; \"everything\"     Outpatient Encounter Medications as of 5/20/2021   Medication Sig Dispense Refill   • potassium chloride ER (KLOR-CON) 10 MEQ tablet TAKE 1 TABLET DAILY 90 tablet 3   • lisinopril (PRINIVIL) 10 MG Tab TAKE 1 TABLET DAILY 90 tablet 3   • carvedilol (COREG) 12.5 MG Tab Take 1 tablet by mouth 2 times a day with meals. 180 tablet 3   • estradiol (ESTRACE) 0.5 MG tablet Take 1 Tab by mouth every day. 90 Tab 3   • Epinastine HCl 0.05 % Solution as needed.     • Naproxen Sodium (ALEVE) 220 MG Cap Take  by mouth.     • furosemide (LASIX) 20 MG Tab TAKE 1 TABLET DAILY 90 Tab 3   • omeprazole (PRILOSEC) 20 MG delayed-release capsule Take 20 mg by mouth every day.     • predniSONE (DELTASONE) 10 MG Tab      • RESTASIS 0.05 % ophthalmic emulsion INT 1 GTT IN OU BID     • allopurinol (ZYLOPRIM) 100 MG Tab Take 100 mg by mouth every day.     • tocilizumab (ACTEMRA) 400 MG/20ML Solution 400 mg by Intravenous route Q30 DAYS.     • Polyvinyl Alcohol-Povidone (REFRESH OP) 2 Drops by Ophthalmic route 2 Times a Day.     • nitroglycerin (NITROSTAT) 0.4 MG SUBL Place 1 Tab under tongue as needed for Chest Pain. 25 Tab 11   • Calcium Carbonate-Vitamin D (CALTRATE 600+D PO) Take 1 Tab by mouth 2 Times a Day.     • Multiple Vitamins-Minerals (CENTRUM SILVER PO) Take 1 Tab by mouth every day.     • [DISCONTINUED] potassium chloride ER (KLOR-CON) 10 MEQ tablet TAKE 1 TABLET DAILY 90 Tab 3   • Carbomer Gel Base (HYDROGEL) Gel by Ophthalmic route as needed. " "(Patient not taking: Reported on 5/20/2021)     • [DISCONTINUED] carvedilol (COREG) 12.5 MG Tab TAKE 1 TABLET TWICE A DAY WITH MEALS 180 Tab 3   • [DISCONTINUED] lisinopril (PRINIVIL) 10 MG Tab TAKE 1 TABLET DAILY 90 Tab 3     No facility-administered encounter medications on file as of 5/20/2021.     Review of Systems   Constitutional: Negative.  Negative for chills, fever and malaise/fatigue.   HENT: Negative.  Negative for sore throat.    Eyes: Negative.    Respiratory: Negative.  Negative for cough, hemoptysis, sputum production, shortness of breath, wheezing and stridor.    Cardiovascular: Negative.  Negative for chest pain, palpitations, orthopnea, claudication, leg swelling and PND.   Gastrointestinal: Negative.    Genitourinary: Negative.    Musculoskeletal: Negative.    Skin: Negative.    Neurological: Negative.  Negative for dizziness, loss of consciousness and weakness.   Endo/Heme/Allergies: Negative.  Does not bruise/bleed easily.   All other systems reviewed and are negative.       Objective:   /72 (BP Location: Left arm, Patient Position: Sitting, BP Cuff Size: Adult)   Pulse 87   Resp 18   Ht 1.575 m (5' 2\")   Wt 59 kg (130 lb)   SpO2 96%   BMI 23.78 kg/m²     Physical Exam   Constitutional: She appears well-developed. No distress.   HENT:   Head: Normocephalic and atraumatic.   Right Ear: External ear normal.   Left Ear: External ear normal.   Nose: Nose normal.   Mouth/Throat: No oropharyngeal exudate.   Eyes: Pupils are equal, round, and reactive to light. Conjunctivae are normal. Right eye exhibits no discharge. Left eye exhibits no discharge. No scleral icterus.   Neck: No JVD present.   Cardiovascular: Normal rate and regular rhythm. Exam reveals no gallop and no friction rub.   No murmur heard.  Pulmonary/Chest: Effort normal. No stridor. No respiratory distress. She has no wheezes. She has no rales. She exhibits no tenderness.   Abdominal: Soft. She exhibits no distension. There " is no guarding.   Musculoskeletal:         General: No tenderness or deformity. Normal range of motion.      Cervical back: Neck supple.   Neurological: She is alert. She has normal reflexes. She displays normal reflexes. No cranial nerve deficit. She exhibits normal muscle tone. Coordination normal.   Skin: Skin is warm and dry. No rash noted. She is not diaphoretic. No erythema. No pallor.   Psychiatric: Her behavior is normal. Judgment and thought content normal.   Nursing note and vitals reviewed.      Assessment:     1. Coronary artery disease involving native coronary artery of native heart without angina pectoris  potassium chloride ER (KLOR-CON) 10 MEQ tablet    lisinopril (PRINIVIL) 10 MG Tab   2. Chronic systolic congestive heart failure (CMS-HCC)  carvedilol (COREG) 12.5 MG Tab       Medical Decision Making:  Today's Assessment / Status / Plan:     85-year-old female with severe asymptomatic aortic stenosis and chronic kidney disease with lower extremity edema and a pacemaker.  She continues to be asymptomatic from her aortic stenosis.  We will continue watchful waiting.  I refilled her medications.  We will see her back in 6 months.

## 2021-09-29 PROBLEM — H04.123 BILATERAL DRY EYES: Status: RESOLVED | Noted: 2018-05-30 | Resolved: 2021-01-01

## 2021-09-29 PROBLEM — Z87.39 HX OF GOUT: Status: ACTIVE | Noted: 2021-01-01

## 2021-09-29 PROBLEM — N95.1 HOT FLUSHES, PERIMENOPAUSAL: Status: RESOLVED | Noted: 2020-06-08 | Resolved: 2021-01-01

## 2021-09-29 PROBLEM — M10.079 IDIOPATHIC GOUT OF ANKLE: Status: RESOLVED | Noted: 2018-05-30 | Resolved: 2021-01-01

## 2021-09-29 PROBLEM — M05.79 RHEUMATOID ARTHRITIS INVOLVING MULTIPLE SITES WITH POSITIVE RHEUMATOID FACTOR (HCC): Status: ACTIVE | Noted: 2021-01-01

## 2021-09-29 PROBLEM — L57.0 ACTINIC KERATOSIS: Status: RESOLVED | Noted: 2017-07-21 | Resolved: 2021-01-01

## 2021-09-29 PROBLEM — Z79.890 HORMONE REPLACEMENT THERAPY (HRT): Status: ACTIVE | Noted: 2021-01-01

## 2021-09-29 NOTE — PROGRESS NOTES
Subjective:     Shira Colón is a 86 y.o. female presenting to establish care:    1.  Rheumatoid arthritis: Has been stable, is seeing rheumatology regularly.  She is currently on 2 infusions, does not remember the name of the second 1.  She will take prednisone rarely.    2.  CAD, pacemaker, SVT, hypertension: Has been stable, sees cardiology regularly.  She continues on her medications regularly with no issues.  Is on carvedilol, furosemide, lisinopril, potassium.    3.  Gout: Has a history of gout, currently on allopurinol for prevention.    4.  Hormone replacement therapy: Is currently on estradiol 0.5 mg daily.  She reports that she gets mood changes when she tries to wean off estradiol.    5.  GERD: Has been stable on omeprazole.        Current Outpatient Medications:   •  tocilizumab (ACTEMRA) 400 mg/20 mL Solution, Infuse  into a venous catheter., Disp: , Rfl:   •  furosemide (LASIX) 20 MG Tab, TAKE 1 TABLET BY MOUTH EVERY DAY, Disp: 90 tablet, Rfl: 3  •  potassium chloride ER (KLOR-CON) 10 MEQ tablet, TAKE 1 TABLET DAILY, Disp: 90 tablet, Rfl: 3  •  lisinopril (PRINIVIL) 10 MG Tab, TAKE 1 TABLET DAILY, Disp: 90 tablet, Rfl: 3  •  carvedilol (COREG) 12.5 MG Tab, Take 1 tablet by mouth 2 times a day with meals., Disp: 180 tablet, Rfl: 3  •  estradiol (ESTRACE) 0.5 MG tablet, Take 1 Tab by mouth every day., Disp: 90 Tab, Rfl: 3  •  Naproxen Sodium (ALEVE) 220 MG Cap, Take  by mouth., Disp: , Rfl:   •  omeprazole (PRILOSEC) 20 MG delayed-release capsule, Take 20 mg by mouth every day., Disp: , Rfl:   •  predniSONE (DELTASONE) 10 MG Tab, , Disp: , Rfl:   •  RESTASIS 0.05 % ophthalmic emulsion, INT 1 GTT IN OU BID, Disp: , Rfl:   •  allopurinol (ZYLOPRIM) 100 MG Tab, Take 100 mg by mouth every day., Disp: , Rfl:   •  nitroglycerin (NITROSTAT) 0.4 MG SUBL, Place 1 Tab under tongue as needed for Chest Pain., Disp: 25 Tab, Rfl: 11  •  Calcium Carbonate-Vitamin D (CALTRATE 600+D PO), Take 1 Tab by mouth 2  "Times a Day., Disp: , Rfl:   •  Multiple Vitamins-Minerals (CENTRUM SILVER PO), Take 1 Tab by mouth every day., Disp: , Rfl:     Objective:     Vitals: /78   Pulse 74   Temp 36.4 °C (97.6 °F)   Resp 16   Ht 1.575 m (5' 2\")   Wt 53.5 kg (118 lb)   SpO2 96%   BMI 21.58 kg/m²   General: Alert  HEENT: Normocephalic.   Heart: Regular rate and rhythm.  S1 and S2 normal.  Grade 2/6 systolic murmur  Respiratory: Normal respiratory effort.  Clear to auscultation bilaterally.  Abdomen: Non-distended, soft  Extremities: No leg edema.      Assessment/Plan:     Shira was seen today for establish care.    Diagnoses and all orders for this visit:    Rheumatoid arthritis involving multiple sites with positive rheumatoid factor (HCC)  Chronic, stable, managed by rheumatology    Coronary artery disease involving native coronary artery of native heart without angina pectoris  History of MI (myocardial infarction)  Cardiac pacemaker in situ  AV block, complete (HCC)  SVT (supraventricular tachycardia) (HCC)  Essential hypertension  Chronic, stable, managed by cardiology    Hx of gout  Chronic, stable, continue allopurinol    Hormone replacement therapy (HRT)  Chronic, stable, continue estradiol    Gastroesophageal reflux disease without esophagitis  Chronic, stable, continue omeprazole    Need for vaccination  -     Influenza Vaccine, High Dose (65+ Only)          Return in about 6 months (around 3/29/2022) for routine follow up.    "

## 2021-11-22 NOTE — PROGRESS NOTES
Chief Complaint   Patient presents with   • Coronary Artery Disease     F/V Dx: Coronary artery disease involving native coronary artery of native heart without angina pectoris   • AV Block Complete   • Supraventricular Tachycardia (SVT)       Subjective:   Shira Colón is a 85 y.o. female who presents today as a follow-up for her aortic stenosis high blood pressure heart failure with reduced ejection fraction with normalized function as well as her pacemaker.    Since was last seen she continues to be active.  She works on yard every day with no functional imitations.  She has started eating a washer when she works in her yard because of unsteadiness on her feet.  She not get any chest pain pressure or shortness of breath.  She is stable on her current medications.  Last echocardiogram confirmed low flow low gradient severe aortic stenosis.    Past Medical History:   Diagnosis Date   • Abnormal liver function test    • Actinic keratosis 7/21/2017   • Anesthesia     PONV   • Arthritis     Rheumatoid Arthritis x 30 years   • Bilateral dry eyes 5/30/2018   • Bronchitis      10-15 years ago; had it 2/2020   • Cough 2/19/2020   • Dental disorder     Full dentures   • Emotional lability 6/8/2020   • Fall from ground level 8/19/2020   • Heart burn    • Heart valve disease     mitral valve prolapse   • Hot flushes, perimenopausal 6/8/2020   • Hypertension    • Idiopathic gout of ankle 5/30/2018   • Indigestion    • Myocardial infarct (HCC)     • Pacemaker    • Pain      feet secondary to RA=2/10   • Palpitations    • Pneumonia 1964   • Rheumatoid arthritis(714.0)    • Snoring    • SVT (supraventricular tachycardia) (HCC)     SVT/A-Flutter   • Unspecified cataract     Surgery L eye     Past Surgical History:   Procedure Laterality Date   • FINGER ORIF Right 6/12/2020    Procedure: ORIF, FINGER-LONG FINGER MERACARPAL SHAFT FRACTURE - VS;  Surgeon: Jens Maharaj M.D.;  Location: SURGERY [facility];  Service:  Orthopedics   • PERCUTANEOUS PINNING Right 6/12/2020    Procedure: PINNING, FRACTURE, PERCUTANEOUS-;  Surgeon: Jens Maharaj M.D.;  Location: [facility];  Service: Orthopedics   • PACEMAKER INSERTION  December 2016    Medtronic Adapta [id number] implanted by Dr. Ang.   • RECOVERY  5/19/2016    Procedure: CATH LAB C, [facility] WITH A SHOT OF THE AORTIC ROOT DR. NAYAK;  Surgeon: Recoveryonly Surgery;  Location: SURGERY PRE-POST PROC UNIT [facility];  Service:    • RECOVERY  4/27/2016    Procedure: CATH LAB MELVIN WITH ANESTHESIA ;  Surgeon: Recoveryonly Surgery;  Location: SURGERY PRE-POST PROC UNIT [facility];  Service:    • RECOVERY  11/4/2014    Performed by Cath-Recovery Surgery at SURGERY SAME DAY [facility]   • GASTROSCOPY WITH BIOPSY  5/7/2014    Performed by Darin Barros M.D. at [facility]   • COLONOSCOPY WITH CLIPPING  5/7/2014    Performed by Darin Barros M.D. at [facility]   • BREAST BIOPSY     • HYSTERECTOMY, VAGINAL     • RECTOCELE REPAIR     • TONSILLECTOMY       Family History   Problem Relation Age of Onset   • Heart Disease Brother      Social History     Socioeconomic History   • Marital status:      Spouse name: Not on file   • Number of children: Not on file   • Years of education: Not on file   • Highest education level: 12th grade   Occupational History   • Not on file   Tobacco Use   • Smoking status: Never Smoker   • Smokeless tobacco: Never Used   Vaping Use   • Vaping Use: Never used   Substance and Sexual Activity   • Alcohol use: Yes     Comment: 1-2 drinks per year   • Drug use: No   • Sexual activity: Never   Other Topics Concern   • Not on file   Social History Narrative   • Not on file     Social Determinants of Health     Financial Resource Strain:    • Difficulty of Paying Living Expenses: Not on file   Food Insecurity: No Food Insecurity   • Worried About Running Out of Food in the Last Year: Never true   • Ran Out of Food in  "the Last Year: Never true   Transportation Needs: No Transportation Needs   • Lack of Transportation (Medical): No   • Lack of Transportation (Non-Medical): No   Physical Activity: Inactive   • Days of Exercise per Week: 0 days   • Minutes of Exercise per Session: 0 min   Stress: Unknown   • Feeling of Stress : Patient refused   Social Connections: Socially Isolated   • Frequency of Communication with Friends and Family: More than three times a week   • Frequency of Social Gatherings with Friends and Family: Once a week   • Attends Latter day Services: Never   • Active Member of Clubs or Organizations: No   • Attends Club or Organization Meetings: Never   • Marital Status:    Intimate Partner Violence:    • Fear of Current or Ex-Partner: Not on file   • Emotionally Abused: Not on file   • Physically Abused: Not on file   • Sexually Abused: Not on file   Housing Stability: Unknown   • Unable to Pay for Housing in the Last Year: No   • Number of Places Lived in the Last Year: Not on file   • Unstable Housing in the Last Year: No     Allergies   Allergen Reactions   • Methotrexate      Pt. States it effects her liver.   • Remicade [Infliximab Injection]      Shaking/hot-flashes   • Sulfites Diarrhea   • Other Environmental Runny Nose and Itching     Plants; \"everything\"     Outpatient Encounter Medications as of 11/22/2021   Medication Sig Dispense Refill   • potassium chloride ER (KLOR-CON) 10 MEQ tablet TAKE 1 TABLET DAILY 90 Tablet 3   • lisinopril (PRINIVIL) 10 MG Tab TAKE 1 TABLET DAILY 90 Tablet 3   • furosemide (LASIX) 20 MG Tab Take 1 Tablet by mouth every day. 90 Tablet 3   • carvedilol (COREG) 12.5 MG Tab Take 1 Tablet by mouth 2 times a day with meals. 180 Tablet 3   • estradiol (ESTRACE) 0.5 MG tablet Take 1 Tablet by mouth every day. 90 Tablet 3   • tocilizumab (ACTEMRA) 400 mg/20 mL Solution Infuse  into a venous catheter.     • [DISCONTINUED] furosemide (LASIX) 20 MG Tab TAKE 1 TABLET BY MOUTH " "EVERY DAY 90 tablet 3   • [DISCONTINUED] potassium chloride ER (KLOR-CON) 10 MEQ tablet TAKE 1 TABLET DAILY 90 tablet 3   • [DISCONTINUED] lisinopril (PRINIVIL) 10 MG Tab TAKE 1 TABLET DAILY 90 tablet 3   • [DISCONTINUED] carvedilol (COREG) 12.5 MG Tab Take 1 tablet by mouth 2 times a day with meals. 180 tablet 3   • Naproxen Sodium (ALEVE) 220 MG Cap Take  by mouth.     • omeprazole (PRILOSEC) 20 MG delayed-release capsule Take 20 mg by mouth every day.     • predniSONE (DELTASONE) 10 MG Tab      • RESTASIS 0.05 % ophthalmic emulsion INT 1 GTT IN OU BID     • allopurinol (ZYLOPRIM) 100 MG Tab Take 100 mg by mouth every day.     • nitroglycerin (NITROSTAT) 0.4 MG SUBL Place 1 Tab under tongue as needed for Chest Pain. 25 Tab 11   • Calcium Carbonate-Vitamin D (CALTRATE 600+D PO) Take 1 Tab by mouth 2 Times a Day.     • Multiple Vitamins-Minerals (CENTRUM SILVER PO) Take 1 Tab by mouth every day.       No facility-administered encounter medications on file as of 11/22/2021.     Review of Systems   Constitutional: Negative.  Negative for chills, fever and malaise/fatigue.   HENT: Negative.  Negative for sore throat.    Eyes: Negative.    Respiratory: Negative.  Negative for cough, hemoptysis, sputum production, shortness of breath, wheezing and stridor.    Cardiovascular: Negative.  Negative for chest pain, palpitations, orthopnea, claudication, leg swelling and PND.   Gastrointestinal: Negative.    Genitourinary: Negative.    Musculoskeletal: Negative.    Skin: Negative.    Neurological: Negative.  Negative for dizziness, loss of consciousness and weakness.   Endo/Heme/Allergies: Negative.  Does not bruise/bleed easily.   All other systems reviewed and are negative.       Objective:   /80 (BP Location: Left arm, Patient Position: Sitting, BP Cuff Size: Adult)   Pulse 82   Resp 12   Ht 1.575 m (5' 2\")   Wt 54.4 kg (120 lb)   SpO2 95%   BMI 21.95 kg/m²     Physical Exam  Vitals and nursing note reviewed. "   Constitutional:       General: She is not in acute distress.     Appearance: She is well-developed. She is not diaphoretic.   HENT:      Head: Normocephalic and atraumatic.      Right Ear: External ear normal.      Left Ear: External ear normal.      Nose: Nose normal.      Mouth/Throat:      Pharynx: No oropharyngeal exudate.   Eyes:      General: No scleral icterus.        Right eye: No discharge.         Left eye: No discharge.      Conjunctiva/sclera: Conjunctivae normal.      Pupils: Pupils are equal, round, and reactive to light.   Neck:      Vascular: No JVD.   Cardiovascular:      Rate and Rhythm: Normal rate and regular rhythm.      Heart sounds: No murmur heard.  No friction rub. No gallop.    Pulmonary:      Effort: Pulmonary effort is normal. No respiratory distress.      Breath sounds: No stridor. No wheezing or rales.   Chest:      Chest wall: No tenderness.   Abdominal:      General: There is no distension.      Palpations: Abdomen is soft.      Tenderness: There is no guarding.   Musculoskeletal:         General: No tenderness or deformity. Normal range of motion.      Cervical back: Neck supple.   Skin:     General: Skin is warm and dry.      Coloration: Skin is not pale.      Findings: No erythema or rash.   Neurological:      Mental Status: She is alert.      Cranial Nerves: No cranial nerve deficit.      Motor: No abnormal muscle tone.      Coordination: Coordination normal.      Deep Tendon Reflexes: Reflexes are normal and symmetric. Reflexes normal.   Psychiatric:         Behavior: Behavior normal.         Thought Content: Thought content normal.         Judgment: Judgment normal.         Assessment:     1. Hormone replacement therapy (HRT)     2. Rheumatoid arthritis involving multiple sites with positive rheumatoid factor ([facility])     3. Anemia, unspecified type     4. Stage 3a chronic kidney disease ([facility])     5. SVT (supraventricular tachycardia) ([facility])     6. Coronary artery disease  involving native coronary artery of native heart without angina pectoris  potassium chloride ER (KLOR-CON) 10 MEQ tablet    lisinopril (PRINIVIL) 10 MG Tab   7. Essential hypertension     8. History of MI (myocardial infarction)     9. Nonrheumatic aortic valve stenosis     10. Mitral valve insufficiency, unspecified etiology     11. Dilated cardiomyopathy (HCC)     12. AV block, complete (HCC)     13. Cardiac pacemaker in situ     14. High risk medication use     15. Edema, unspecified type  furosemide (LASIX) 20 MG Tab   16. Chronic systolic congestive heart failure (CMS-HCC)  carvedilol (COREG) 12.5 MG Tab       Medical Decision Making:  Today's Assessment / Status / Plan:     87-year-old female with severe asymptomatic aortic stenosis and chronic kidney disease with lower extremity edema and a pacemaker.  Again as far as I can tell she is asymptomatic from her aortic stenosis.  I would not like to send her for further evaluation given her good functional capacity.  We can see her back in 6 months with an echo after that.  I refilled her medications today.

## 2021-11-22 NOTE — PROGRESS NOTES
Device is working normally. 28 mode switching episodes (<5 seconds, <0.1% of total time).  Normal sensing of RA lead; unable to measure R waves. Stable capture of RA and RV leads; stable impedances. Battery longevity is 6 years.  No changes are made today.    She does see Dr. Tatum today too.    FU in 6 months for next PM check with me.

## 2021-12-30 NOTE — PROGRESS NOTES
Chief Complaint   Patient presents with   • Back Pain     fall 2021   • Nausea   • Shortness of Breath       Subjective:     HPI:   Shira Colón presents today with the followin. Acute left-sided low back pain with right-sided sciatica  Patient had a fall  and has had very significant not only midline low back pain but left SI joint region pain since.  She is doing stretching and is trying to improve but the pain remains quite severe and she is having nausea and difficulty eating as well as episodes of vomiting.  This seems to have gotten worse with the pain medication and the pain medication is working well only when she takes a full dose.  PDMP review shows no inconsistencies.  She is finished the medication that her rheumatologist gave her.  I am renewing the Percocet but feel that it is important that we get imaging.    2. Stage 3b chronic kidney disease (HCC)  Patient does have moderate chronic kidney disease, relatively stable overall.    3. Chest pain in adult  Had an episode of chest pain two nights ago.  This went away after a few minutes.  Her pressure is very low.     4. Shortness of breath  Needs CXR, unable to get a reliable pulse ox as her hands are very cold.        Patient Active Problem List    Diagnosis Date Noted   • Hormone replacement therapy (HRT) 2021   • Hx of gout 2021   • Rheumatoid arthritis involving multiple sites with positive rheumatoid factor (HCC) 2021   • High risk medication use 09/10/2019   • Cardiac pacemaker in situ 2016   • AV block, complete ([facility]) 2016   • Dilated cardiomyopathy ([facility]) 2016   • Nonrheumatic aortic valve stenosis 2016   • Mitral regurgitation 2016   • History of MI (myocardial infarction) 2015   • Essential hypertension 2015   • Gastroesophageal reflux disease without esophagitis 2015   • Osteoporosis 2015   • CAD (coronary artery disease) 10/23/2014   • SVT  "(supraventricular tachycardia) ([facility]) 08/26/2014   • Anemia 05/07/2014   • Chronic kidney disease (CKD), stage III (moderate) ([facility]) 05/07/2014       Current medicines (including changes today)  Current Outpatient Medications   Medication Sig Dispense Refill   • oxyCODONE-acetaminophen (PERCOCET) 5-325 MG Tab Take 1 Tablet by mouth every 6 hours as needed for Moderate Pain or Severe Pain for up to 15 days. 60 tablets is a 15 day quantity. 60 Tablet 0   • potassium chloride ER (KLOR-CON) 10 MEQ tablet TAKE 1 TABLET DAILY 90 Tablet 3   • lisinopril (PRINIVIL) 10 MG Tab TAKE 1 TABLET DAILY 90 Tablet 3   • furosemide (LASIX) 20 MG Tab Take 1 Tablet by mouth every day. 90 Tablet 3   • carvedilol (COREG) 12.5 MG Tab Take 1 Tablet by mouth 2 times a day with meals. 180 Tablet 3   • estradiol (ESTRACE) 0.5 MG tablet Take 1 Tablet by mouth every day. 90 Tablet 3   • tocilizumab (ACTEMRA) 400 mg/20 mL Solution Infuse  into a venous catheter.     • Naproxen Sodium (ALEVE) 220 MG Cap Take  by mouth.     • omeprazole (PRILOSEC) 20 MG delayed-release capsule Take 20 mg by mouth every day.     • RESTASIS 0.05 % ophthalmic emulsion INT 1 GTT IN OU BID     • allopurinol (ZYLOPRIM) 100 MG Tab Take 100 mg by mouth every day.     • nitroglycerin (NITROSTAT) 0.4 MG SUBL Place 1 Tab under tongue as needed for Chest Pain. 25 Tab 11   • Calcium Carbonate-Vitamin D (CALTRATE 600+D PO) Take 1 Tab by mouth 2 Times a Day.     • Multiple Vitamins-Minerals (CENTRUM SILVER PO) Take 1 Tab by mouth every day.       No current facility-administered medications for this visit.       Allergies   Allergen Reactions   • Methotrexate      Pt. States it effects her liver.   • Remicade [Infliximab Injection]      Shaking/hot-flashes   • Sulfites Diarrhea   • Other Environmental Runny Nose and Itching     Plants; \"everything\"       ROS: As per HPI       Objective:     BP (!) 80/48 (BP Location: Right arm, Patient Position: Sitting, BP Cuff Size: Adult)   " "Pulse 64   Temp (!) 35 °C (95 °F) (Temporal)   Resp 18   Ht 1.575 m (5' 2\")   Wt 53.1 kg (117 lb)   SpO2 92%  Body mass index is 21.4 kg/m².    Physical Exam:  Constitutional: Well-developed and well-nourished. Not diaphoretic. Frail, somewhat distressed.  She seems very fatigued.  Patient, son, physician and staff all wearing masks.  Daughter is watching over video.  Skin: Skin is warm and dry. No rash noted.  Head: Atraumatic without lesions.  Eyes: Conjunctivae and extraocular motions are normal. Pupils are equal, round, and reactive to light. No scleral icterus.   Ears:  External ears unremarkable.   Neck: Supple, trachea midline. No thyromegaly present. No cervical or supraclavicular lymphadenopathy. No JVD or carotid bruits appreciated  Cardiovascular: Regular rate and rhythm.  Normal S1, S2 without murmur appreciated.  Chest: Effort normal. Clear to auscultation throughout. No adventitious sounds.   Extremities: No cyanosis, clubbing, erythema, nor edema.  Her back shows some pain in the left SI joint region and in the lower lumbar region.  She is somewhat unsteady when she stands.  She seems very fatigued and weak.  Neurological: Alert and oriented x 3.   Psychiatric:  Behavior, mood, and affect are appropriate.       Assessment and Plan:     87 y.o. female with the following issues:    1. Acute left-sided low back pain with right-sided sciatica  DX-LUMBAR SPINE-2 OR 3 VIEWS    DX-SACROILIAC JOINTS 2-    oxyCODONE-acetaminophen (PERCOCET) 5-325 MG Tab    Consent for Opiate Prescription   2. Stage 3b chronic kidney disease (HCC)     3. Chest pain in adult  DX-CHEST-2 VIEWS   4. Shortness of breath  DX-CHEST-2 VIEWS     Discussed taking her to ER if her symptoms are not improving.  Her son will check her blood pressure twice daily.  She would like to avoid the ER if at all possible.  Explained to her son that we cannot direct admit her at this time as we have over 30 patients waiting in the ER for " beds.    Followup: Return in about 6 weeks (around 2/9/2022), or if symptoms worsen or fail to improve.

## 2021-12-31 PROBLEM — E87.5 HYPERKALEMIA: Status: ACTIVE | Noted: 2021-01-01

## 2021-12-31 PROBLEM — T14.8XXA FRACTURE: Status: ACTIVE | Noted: 2021-01-01

## 2021-12-31 PROBLEM — N17.9 AKI (ACUTE KIDNEY INJURY) (HCC): Status: ACTIVE | Noted: 2021-01-01

## 2021-12-31 NOTE — ED TRIAGE NOTES
"87 yr old female to triage  Chief Complaint   Patient presents with   • N/V     BIB [facility] from home report patient fell December 11, 2021 and backwards on her butt. Patient continuous to have low back pain, nausea and vomiting since then causing her not to eat.  The home health nurse came to visit her today and an IV 22g LAC estblished with blood drawn, the home health notice the potassium and creatinine was elevated.  Potassium at 5.8 and creatinine 4.5.    • Low Back Pain   • Fall     /57   Pulse 90   Temp 36.3 °C (97.3 °F) (Temporal)   Resp 19   Ht 1.575 m (5' 2\")   Wt 53.1 kg (117 lb)   SpO2 95%   BMI 21.40 kg/m²     Has this patient been vaccinated for COVID Yes   If not, would they like to be vaccinated while in the ER if eligible?  No   Would the patient like to speak with the ERP about the possibility of receiving the COVID vaccine today before making a decision? No    "

## 2021-12-31 NOTE — PATIENT COMMUNICATION
S/W Evaristo, patient is not eating or drinking anything. Son can't give reason why she doesn't want to eat but doesn't want to eat.     Son is concerned because she is spiraling down. Wanted a direct admit into the hospital out of fear of covid. Dr. Christine declined, not a candidate at this time. Did advise, that [facility] does bypass waiting room but does not guarantee she won't be in the ER for an extended period of time.     Pt today has had 1 4 oz jello and 6oz Gatorade.. Pt is of sound mind per son, know what she wants but has not had the conversation of palliative vs hospice vs actual treatment.     Pt does not wear a brief but is unknown if urine is dark or contains an odor.     BP now 71/44 HR 89 MAP 53- concerns of lack of organ perfusion.     A&Ox3.5- was just woken up, had a little trouble with today's date but knew it was Thursday in December, her city, her birthday, finally came up with Ivonne. Patient hard to understand, per son has been sleeping all day and hard to arouse at times.     Son wants to take her to ER but patient has been refusing.     S/W pt directly, advised about concern of failure to thrive, dehydration, malnourishment, had a recent fall. If she continues to neglect her health she may go to sleep and not wake up. It will eventually kill her, fear of Covid is not worth it.     Patient agreeable. Son contacting [facility]-

## 2021-12-31 NOTE — ED PROVIDER NOTES
ED Provider  Scribed for Jose D Manuel D.O. by Anoop Penny. 12/31/2021  3:26 PM    Means of arrival:EMS  History obtained from:Patient  History limited by: None    CHIEF COMPLAINT  Chief Complaint   Patient presents with    N/V     BIB [facility] from home report patient fell December 11, 2021 and backwards on her butt. Patient continuous to have low back pain, nausea and vomiting since then causing her not to eat.  The home health nurse came to visit her today and an IV 22g LAC estblished with blood drawn, the home health notice the potassium and creatinine was elevated.  Potassium at 5.8 and creatinine 4.5.     Low Back Pain    Fall       [facility]  Shira Colón is a 87 y.o. female with history of pacemaker placement via EMS who presents for severe lower back pain onset 3 weeks ago. Patient's family reports the patient had a history of a fall 3 weeks ago, which prompted her to visit Dr. Robb (Rheumatology) who ordered imaging and prescribed Oxycodone for her pain. However, patient notes she was unable to get scanned due to her pain. She states taking Oxycodone alleviated her pain. Patient notes she lives she blood drawn earlier today by a home health nurse. Home Health noticed her potassium and creatinine were elevated and recommended the patient call EMS. Patient endorses associated nausea, vomiting, but denies any leg numbness, leg weakness, TBI, or loss of conscioussness. She notes she uses a wheelchair since her fall..    REVIEW OF SYSTEMS  See HPI for further details. All other systems are negative.     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    SOCIAL HISTORY  Social History     Tobacco Use    Smoking status: Never Smoker    Smokeless tobacco: Never Used   Vaping Use    Vaping Use: Never used   Substance and Sexual Activity    Alcohol use: Yes     Comment: 1-2 drinks per year    Drug use: No    Sexual activity: Not Currently       SURGICAL HISTORY   has a past surgical history that includes tonsillectomy; hysterectomy, vaginal; breast biopsy; gastroscopy with biopsy (5/7/2014); colonoscopy with clipping (5/7/2014); rectocele repair; recovery (11/4/2014); recovery (4/27/2016); recovery (5/19/2016); pacemaker insertion (December 2016); orif, finger (Right, 6/12/2020); and percutaneous pinning (Right, 6/12/2020).    CURRENT MEDICATIONS  Home Medications       Reviewed by Kari Dugan (Pharmacy Tech) on 12/31/21 at 1550  Med List Status: Complete     Medication Last Dose Status   acetaminophen (TYLENOL) 500 MG Tab 12/30/2021 Active   allopurinol (ZYLOPRIM) 100 MG Tab 12/29/2021 Active   Calcium Carbonate-Vitamin D (CALTRATE 600+D PO) 12/29/2021 Active   carvedilol (COREG) 12.5 MG Tab 12/29/2021 Active   estradiol (ESTRACE) 0.5 MG tablet 12/29/2021 Active   furosemide (LASIX) 20 MG Tab 12/29/2021 Active   lisinopril (PRINIVIL) 10 MG Tab 12/29/2021 Active   Multiple Vitamins-Minerals (CENTRUM SILVER PO) 12/29/2021 Active   Naproxen Sodium (ALEVE) 220 MG Cap FEW DAYS AGO Active   nitroglycerin (NITROSTAT) 0.4 MG SUBL PRN Active   omeprazole (PRILOSEC) 20 MG delayed-release capsule 12/29/2021 Active   oxyCODONE-acetaminophen (PERCOCET) 5-325 MG Tab FEW DAYS AGO Active   potassium chloride ER (KLOR-CON) 10 MEQ tablet 12/29/2021 Active   TOCILIZUMAB IV UNK Active                    ALLERGIES  Allergies   Allergen Reactions    Methotrexate      Pt. States it  "effects her liver.    Remicade [Infliximab Injection]      Shaking/hot-flashes    Sulfites Diarrhea    Other Environmental Runny Nose and Itching     Plants; \"everything\"       PHYSICAL EXAM  VITAL SIGNS: /57   Pulse 90   Temp 36.3 °C (97.3 °F) (Temporal)   Resp 19   Ht 1.575 m (5' 2\")   Wt 53.1 kg (117 lb)   SpO2 95%   BMI 21.40 kg/m²   Constitutional: Alert in no apparent distress.  HENT: No signs of trauma, mucous membranes are moist  Eyes: Conjunctiva normal, Non-icteric.   Neck: Normal range of motion, No tenderness, Supple.  Lymphatic: No lymphadenopathy noted.   Cardiovascular: Regular rate and rhythm, no murmurs.   Thorax & Lungs: Normal breath sounds, No respiratory distress, No wheezing, No chest tenderness.   Abdomen: Bowel sounds normal, Soft, No tenderness, No masses, No pulsatile masses. No peritoneal signs.  Skin: Warm, Dry, normal color.   Back: Mild diffuse tenderness in the L-Spine, no deformities.   Extremities: No edema, No tenderness, No cyanosis  Musculoskeletal: Good range of motion in all major joints. No major deformities noted.   Neurologic: Alert and oriented x4, Normal motor function, Normal sensory function, No focal deficits noted.   Psychiatric: Affect normal, Judgment normal, Mood normal.     DIAGNOSTIC STUDIES / PROCEDURES    EKG  12 Lead EKG interpreted by me shown below.      LABS  Results for orders placed or performed during the hospital encounter of 12/31/21   CBC WITH DIFFERENTIAL   Result Value Ref Range    WBC 14.3 (H) 4.8 - 10.8 K/uL    RBC 4.63 4.20 - 5.40 M/uL    Hemoglobin 14.5 12.0 - 16.0 g/dL    Hematocrit 43.7 37.0 - 47.0 %    MCV 94.4 81.4 - 97.8 fL    MCH 31.3 27.0 - 33.0 pg    MCHC 33.2 (L) 33.6 - 35.0 g/dL    RDW 49.9 35.9 - 50.0 fL    Platelet Count 156 (L) 164 - 446 K/uL    MPV 10.2 9.0 - 12.9 fL    Neutrophils-Polys 69.00 44.00 - 72.00 %    Lymphocytes 8.00 (L) 22.00 - 41.00 %    Monocytes 14.00 (H) 0.00 - 13.40 %    Eosinophils 0.00 0.00 - 6.90 % "    Basophils 0.00 0.00 - 1.80 %    Nucleated RBC 0.00 /100 WBC    Neutrophils (Absolute) 11.15 (H) 2.00 - 7.15 K/uL    Lymphs (Absolute) 1.14 1.00 - 4.80 K/uL    Monos (Absolute) 2.00 (H) 0.00 - 0.85 K/uL    Eos (Absolute) 0.00 0.00 - 0.51 K/uL    Baso (Absolute) 0.00 0.00 - 0.12 K/uL    NRBC (Absolute) 0.00 K/uL   COMP METABOLIC PANEL   Result Value Ref Range    Sodium 137 135 - 145 mmol/L    Potassium 6.2 (H) 3.6 - 5.5 mmol/L    Chloride 99 96 - 112 mmol/L    Co2 23 20 - 33 mmol/L    Anion Gap 15.0 7.0 - 16.0    Glucose 107 (H) 65 - 99 mg/dL    Bun 84 (HH) 8 - 22 mg/dL    Creatinine 4.73 (H) 0.50 - 1.40 mg/dL    Calcium 9.2 8.4 - 10.2 mg/dL    AST(SGOT) 37 12 - 45 U/L    ALT(SGPT) 17 2 - 50 U/L    Alkaline Phosphatase 110 (H) 30 - 99 U/L    Total Bilirubin 0.6 0.1 - 1.5 mg/dL    Albumin 2.5 (L) 3.2 - 4.9 g/dL    Total Protein 6.0 6.0 - 8.2 g/dL    Globulin 3.5 1.9 - 3.5 g/dL    A-G Ratio 0.7 g/dL   LIPASE   Result Value Ref Range    Lipase 14 7 - 58 U/L   URINALYSIS    Specimen: Urine, Clean Catch   Result Value Ref Range    Color Dark Yellow     Character Hazy (A)     Specific Gravity 1.025 <1.035    Ph 5.0 5.0 - 8.0    Glucose Negative Negative mg/dL    Ketones Trace (A) Negative mg/dL    Protein Negative Negative mg/dL    Bilirubin Negative Negative    Nitrite Positive (A) Negative    Leukocyte Esterase Negative Negative    Occult Blood Negative Negative    Micro Urine Req Microscopic    ESTIMATED GFR   Result Value Ref Range    GFR If  11 (A) >60 mL/min/1.73 m 2    GFR If Non African American 9 (A) >60 mL/min/1.73 m 2   URINE MICROSCOPIC (W/UA)   Result Value Ref Range    WBC 1-3 /hpf    RBC 0-2 /hpf    Bacteria Many (A) None /hpf    Epithelial Cells Few Few /hpf    Mucous Threads Few /hpf    Urine Crystals Few Amorphous /hpf    Ca Oxalate Crystal Rare /hpf    Hyaline Cast 0-2 /lpf    Granular Casts 0-2 /lpf   DIFFERENTIAL MANUAL   Result Value Ref Range    Bands-Stabs 9.00 0.00 - 10.00 %     Manual Diff Status PERFORMED    PLATELET ESTIMATE   Result Value Ref Range    Plt Estimation Decreased    MORPHOLOGY   Result Value Ref Range    RBC Morphology Normal     Large Platelets 2+     Toxic Gran Slight    EKG   Result Value Ref Range    Report       [facility] Emergency Dept.    Test Date:  2021  Pt Name:    АЛЕКСАНДР HARRINGTON   Department: EDSM  MRN:        [id number]                      [facility]  Gender:     Female                       Technician: [id number]  :        [id number]                   Requested By:KIM KEEN  Order #:    [id number]                    Reading MD:    Measurements  Intervals                                Axis  Rate:       95                           P:          21  RI:         197                          QRS:        -88  QRSD:       141                          T:          78  QT:         372  QTc:        468    Interpretive Statements  Atrial-sensed ventricular-paced rhythm  No further analysis attempted due to paced rhythm  Compared to ECG 2020 10:47:15  No significant changes        All labs reviewed by me.    RADIOLOGY  CT-LSPINE W/O PLUS RECONS   Final Result      1.  Sacral fractures seen involving the dorsal aspect of S1 segment, with comminuted and displaced fractures involving S2 sacral segment extending into RIGHT sacral ala.   2.  Probable chronic moderate to severe compression of L1.   3.  Multilevel degenerative change of lumbar spine with associated mild anterolisthesis at L4-5.        The radiologist's interpretations of all radiological studies have been reviewed by me.    Films have been independently by me      COURSE  Pertinent Labs & Imaging studies reviewed. (See chart for details)    3:26 PM - Patient seen and examined at bedside. Discussed plan of care. The patient will be medicated with Zofran ODT. Ordered for CT L Spine, CBC w/ diff, CMP, Lipase, and UA to evaluate her symptoms.    3:55 PM  - Ordered Differential Manual, Platelet Estimate, and Morphology to further evaluate the patient.    4:45 PM - Paged Hospitalist    4:47 PM - Patient was reevaluated at bedside. Discussed lab and radiology results with the patient. I informed the patient she has an old sacral fracture. The plan for admission was discussed due to her hyperkalemia and elevated creatinine. Patient and/or family was given the opportunity to ask any questions. Patient and/or verbalizes understanding and agreement to this plan of care. Patient will be treated with NS, sodium bicarbonate, calcium gluconate, dextrose, and insuiln for her symptoms.     4:55 PM I discussed the patient's case and the above findings with Dr. Amaya (Hospitalist) who agrees to assess the patient for hospitalization. Ordered POCT Glucose and Urine Microscopic to further evaluate his symptoms.    HYDRATION: Based on the patient's presentation of Other hyperkalemia and renal failure dehydration  the patient was given IV fluids. IV Hydration was used because oral hydration was not adequate alone. Upon recheck following hydration, the patient was improved.     MEDICAL DECISION MAKING  This is a 87 y.o. female who presents with complaints of a low back pain.  She is in significant pain, she is not been eating or drinking.  She had outpatient labs that showed concerns for hyperkalemia and renal injury.    CT of the lumbar spine shows a sacral fracture.  There is no neurological deficits there is no need for MRI at this time.    Her lab test shows a increased creatinine, this is increased from her previous by review of her records, and her BUN is significantly elevated consistent with prerenal azotemia and dehydration as a cause of her symptoms.  IV fluids have been initiated.    With a hyper kalemia the patient was given insulin, and glucose IV along with IV calcium, and IV bicarbonate.  Patient is stable for admission to the floor.  Spoke with hospitalist for  admission.      Critical care time 30 minutes      DISPOSITION:  Patient will be hospitalized by Dr. Amaya in guarded condition.     FINAL IMPRESSION  1. Closed fracture of sacrum, unspecified portion of sacrum, initial encounter (HCC)    2. Acute kidney injury (HCC)    3. Hyperkalemia         Anoop PENA (Scribe), am scribing for, and in the presence of, Jose D Manuel D.O..    Electronically signed by: Anoop Penny (Scribe), 12/31/2021    IJose D D.O. personally performed the services described in this documentation, as scribed by Anoop Penny in my presence, and it is both accurate and complete.    The note accurately reflects work and decisions made by me.  Jose D Manuel D.O.  12/31/2021  7:55 PM

## 2022-01-01 ENCOUNTER — APPOINTMENT (OUTPATIENT)
Dept: RADIOLOGY | Facility: MEDICAL CENTER | Age: 87
DRG: 682 | End: 2022-01-01
Attending: INTERNAL MEDICINE
Payer: MEDICARE

## 2022-01-01 VITALS
RESPIRATION RATE: 15 BRPM | HEIGHT: 63 IN | OXYGEN SATURATION: 88 % | DIASTOLIC BLOOD PRESSURE: 33 MMHG | BODY MASS INDEX: 23.05 KG/M2 | HEART RATE: 109 BPM | SYSTOLIC BLOOD PRESSURE: 62 MMHG | WEIGHT: 130.07 LBS | TEMPERATURE: 97.9 F

## 2022-01-01 LAB
ALBUMIN SERPL BCP-MCNC: 2.2 G/DL (ref 3.2–4.9)
ALBUMIN/GLOB SERPL: 0.7 G/DL
ALP SERPL-CCNC: 93 U/L (ref 30–99)
ALT SERPL-CCNC: 14 U/L (ref 2–50)
ANION GAP SERPL CALC-SCNC: 11 MMOL/L (ref 7–16)
ANION GAP SERPL CALC-SCNC: 13 MMOL/L (ref 7–16)
AST SERPL-CCNC: 31 U/L (ref 12–45)
BASOPHILS # BLD AUTO: 0 % (ref 0–1.8)
BASOPHILS # BLD: 0 K/UL (ref 0–0.12)
BILIRUB SERPL-MCNC: 0.4 MG/DL (ref 0.1–1.5)
BLOOD CULTURE HOLD CXBCH: NORMAL
BUN SERPL-MCNC: 75 MG/DL (ref 8–22)
BUN SERPL-MCNC: 80 MG/DL (ref 8–22)
BUN SERPL-MCNC: 82 MG/DL (ref 8–22)
BUN SERPL-MCNC: 83 MG/DL (ref 8–22)
BUN SERPL-MCNC: 83 MG/DL (ref 8–22)
CALCIUM SERPL-MCNC: 7.9 MG/DL (ref 8.4–10.2)
CALCIUM SERPL-MCNC: 8.2 MG/DL (ref 8.4–10.2)
CALCIUM SERPL-MCNC: 8.4 MG/DL (ref 8.4–10.2)
CALCIUM SERPL-MCNC: 8.7 MG/DL (ref 8.4–10.2)
CALCIUM SERPL-MCNC: 8.8 MG/DL (ref 8.4–10.2)
CHLORIDE SERPL-SCNC: 100 MMOL/L (ref 96–112)
CHLORIDE SERPL-SCNC: 100 MMOL/L (ref 96–112)
CHLORIDE SERPL-SCNC: 104 MMOL/L (ref 96–112)
CHLORIDE SERPL-SCNC: 104 MMOL/L (ref 96–112)
CHLORIDE SERPL-SCNC: 108 MMOL/L (ref 96–112)
CO2 SERPL-SCNC: 17 MMOL/L (ref 20–33)
CO2 SERPL-SCNC: 18 MMOL/L (ref 20–33)
CO2 SERPL-SCNC: 19 MMOL/L (ref 20–33)
CO2 SERPL-SCNC: 20 MMOL/L (ref 20–33)
CO2 SERPL-SCNC: 20 MMOL/L (ref 20–33)
CREAT SERPL-MCNC: 2.21 MG/DL (ref 0.5–1.4)
CREAT SERPL-MCNC: 2.5 MG/DL (ref 0.5–1.4)
CREAT SERPL-MCNC: 3.29 MG/DL (ref 0.5–1.4)
CREAT SERPL-MCNC: 4.23 MG/DL (ref 0.5–1.4)
CREAT SERPL-MCNC: 4.24 MG/DL (ref 0.5–1.4)
EOSINOPHIL # BLD AUTO: 0 K/UL (ref 0–0.51)
EOSINOPHIL NFR BLD: 0 % (ref 0–6.9)
ERYTHROCYTE [DISTWIDTH] IN BLOOD BY AUTOMATED COUNT: 49.6 FL (ref 35.9–50)
ERYTHROCYTE [DISTWIDTH] IN BLOOD BY AUTOMATED COUNT: 50.5 FL (ref 35.9–50)
ERYTHROCYTE [DISTWIDTH] IN BLOOD BY AUTOMATED COUNT: 51.5 FL (ref 35.9–50)
ERYTHROCYTE [DISTWIDTH] IN BLOOD BY AUTOMATED COUNT: 54.2 FL (ref 35.9–50)
GLOBULIN SER CALC-MCNC: 3.1 G/DL (ref 1.9–3.5)
GLUCOSE SERPL-MCNC: 100 MG/DL (ref 65–99)
GLUCOSE SERPL-MCNC: 103 MG/DL (ref 65–99)
GLUCOSE SERPL-MCNC: 106 MG/DL (ref 65–99)
GLUCOSE SERPL-MCNC: 106 MG/DL (ref 65–99)
GLUCOSE SERPL-MCNC: 116 MG/DL (ref 65–99)
HCT VFR BLD AUTO: 39.8 % (ref 37–47)
HCT VFR BLD AUTO: 40.7 % (ref 37–47)
HCT VFR BLD AUTO: 40.7 % (ref 37–47)
HCT VFR BLD AUTO: 41.1 % (ref 37–47)
HGB BLD-MCNC: 12.8 G/DL (ref 12–16)
HGB BLD-MCNC: 13 G/DL (ref 12–16)
HGB BLD-MCNC: 13.3 G/DL (ref 12–16)
HGB BLD-MCNC: 13.3 G/DL (ref 12–16)
LACTATE BLD-SCNC: 1.8 MMOL/L (ref 0.5–2)
LACTATE BLD-SCNC: 1.8 MMOL/L (ref 0.5–2)
LYMPHOCYTES # BLD AUTO: 1.59 K/UL (ref 1–4.8)
LYMPHOCYTES NFR BLD: 15 % (ref 22–41)
MAGNESIUM SERPL-MCNC: 2.5 MG/DL (ref 1.5–2.5)
MANUAL DIFF BLD: NORMAL
MCH RBC QN AUTO: 31 PG (ref 27–33)
MCH RBC QN AUTO: 31.5 PG (ref 27–33)
MCH RBC QN AUTO: 31.7 PG (ref 27–33)
MCH RBC QN AUTO: 31.9 PG (ref 27–33)
MCHC RBC AUTO-ENTMCNC: 31.1 G/DL (ref 33.6–35)
MCHC RBC AUTO-ENTMCNC: 32.7 G/DL (ref 33.6–35)
MCV RBC AUTO: 101.7 FL (ref 81.4–97.8)
MCV RBC AUTO: 95 FL (ref 81.4–97.8)
MCV RBC AUTO: 96.4 FL (ref 81.4–97.8)
MCV RBC AUTO: 97.6 FL (ref 81.4–97.8)
MONOCYTES # BLD AUTO: 0.95 K/UL (ref 0–0.85)
MONOCYTES NFR BLD AUTO: 9 % (ref 0–13.4)
NEUTROPHILS # BLD AUTO: 8.06 K/UL (ref 2–7.15)
NEUTROPHILS NFR BLD: 74 % (ref 44–72)
NEUTS BAND NFR BLD MANUAL: 2 % (ref 0–10)
NRBC # BLD AUTO: 0 K/UL
NRBC BLD-RTO: 0 /100 WBC
PLATELET # BLD AUTO: 118 K/UL (ref 164–446)
PLATELET # BLD AUTO: 143 K/UL (ref 164–446)
PLATELET # BLD AUTO: 155 K/UL (ref 164–446)
PLATELET # BLD AUTO: 167 K/UL (ref 164–446)
PLATELET BLD QL SMEAR: NORMAL
PMV BLD AUTO: 10.2 FL (ref 9–12.9)
PMV BLD AUTO: 10.5 FL (ref 9–12.9)
PMV BLD AUTO: 10.8 FL (ref 9–12.9)
PMV BLD AUTO: 10.8 FL (ref 9–12.9)
POTASSIUM SERPL-SCNC: 4.6 MMOL/L (ref 3.6–5.5)
POTASSIUM SERPL-SCNC: 4.8 MMOL/L (ref 3.6–5.5)
POTASSIUM SERPL-SCNC: 4.9 MMOL/L (ref 3.6–5.5)
POTASSIUM SERPL-SCNC: 5.7 MMOL/L (ref 3.6–5.5)
POTASSIUM SERPL-SCNC: 5.7 MMOL/L (ref 3.6–5.5)
PROT SERPL-MCNC: 5.3 G/DL (ref 6–8.2)
RBC # BLD AUTO: 4.04 M/UL (ref 4.2–5.4)
RBC # BLD AUTO: 4.17 M/UL (ref 4.2–5.4)
RBC # BLD AUTO: 4.19 M/UL (ref 4.2–5.4)
RBC # BLD AUTO: 4.22 M/UL (ref 4.2–5.4)
RBC BLD AUTO: NORMAL
SODIUM SERPL-SCNC: 133 MMOL/L (ref 135–145)
SODIUM SERPL-SCNC: 133 MMOL/L (ref 135–145)
SODIUM SERPL-SCNC: 135 MMOL/L (ref 135–145)
SODIUM SERPL-SCNC: 136 MMOL/L (ref 135–145)
SODIUM SERPL-SCNC: 136 MMOL/L (ref 135–145)
WBC # BLD AUTO: 10.6 K/UL (ref 4.8–10.8)
WBC # BLD AUTO: 12.9 K/UL (ref 4.8–10.8)
WBC # BLD AUTO: 16 K/UL (ref 4.8–10.8)
WBC # BLD AUTO: 7.7 K/UL (ref 4.8–10.8)

## 2022-01-01 PROCEDURE — A9270 NON-COVERED ITEM OR SERVICE: HCPCS | Performed by: HOSPITALIST

## 2022-01-01 PROCEDURE — 99356 PR PROLONGED SVC I/P OR OBS SETTING 1ST HOUR: CPT | Performed by: INTERNAL MEDICINE

## 2022-01-01 PROCEDURE — 700102 HCHG RX REV CODE 250 W/ 637 OVERRIDE(OP): Performed by: HOSPITALIST

## 2022-01-01 PROCEDURE — 700111 HCHG RX REV CODE 636 W/ 250 OVERRIDE (IP): Performed by: HOSPITALIST

## 2022-01-01 PROCEDURE — 85027 COMPLETE CBC AUTOMATED: CPT

## 2022-01-01 PROCEDURE — 80048 BASIC METABOLIC PNL TOTAL CA: CPT

## 2022-01-01 PROCEDURE — 700105 HCHG RX REV CODE 258: Performed by: INTERNAL MEDICINE

## 2022-01-01 PROCEDURE — 770001 HCHG ROOM/CARE - MED/SURG/GYN PRIV*

## 2022-01-01 PROCEDURE — 700102 HCHG RX REV CODE 250 W/ 637 OVERRIDE(OP): Performed by: INTERNAL MEDICINE

## 2022-01-01 PROCEDURE — 770020 HCHG ROOM/CARE - TELE (206)

## 2022-01-01 PROCEDURE — 99233 SBSQ HOSP IP/OBS HIGH 50: CPT | Performed by: INTERNAL MEDICINE

## 2022-01-01 PROCEDURE — A9270 NON-COVERED ITEM OR SERVICE: HCPCS | Performed by: INTERNAL MEDICINE

## 2022-01-01 PROCEDURE — 74176 CT ABD & PELVIS W/O CONTRAST: CPT | Mod: ME

## 2022-01-01 PROCEDURE — 87186 SC STD MICRODIL/AGAR DIL: CPT

## 2022-01-01 PROCEDURE — 76775 US EXAM ABDO BACK WALL LIM: CPT

## 2022-01-01 PROCEDURE — 700111 HCHG RX REV CODE 636 W/ 250 OVERRIDE (IP): Performed by: INTERNAL MEDICINE

## 2022-01-01 PROCEDURE — 700101 HCHG RX REV CODE 250: Performed by: INTERNAL MEDICINE

## 2022-01-01 PROCEDURE — 87077 CULTURE AEROBIC IDENTIFY: CPT

## 2022-01-01 PROCEDURE — 83605 ASSAY OF LACTIC ACID: CPT | Mod: 91

## 2022-01-01 PROCEDURE — 97165 OT EVAL LOW COMPLEX 30 MIN: CPT

## 2022-01-01 PROCEDURE — 99232 SBSQ HOSP IP/OBS MODERATE 35: CPT | Performed by: INTERNAL MEDICINE

## 2022-01-01 PROCEDURE — 97162 PT EVAL MOD COMPLEX 30 MIN: CPT

## 2022-01-01 PROCEDURE — 71045 X-RAY EXAM CHEST 1 VIEW: CPT

## 2022-01-01 PROCEDURE — 87040 BLOOD CULTURE FOR BACTERIA: CPT

## 2022-01-01 PROCEDURE — 700101 HCHG RX REV CODE 250: Performed by: HOSPITALIST

## 2022-01-01 RX ORDER — SIMETHICONE 125 MG
125 TABLET,CHEWABLE ORAL 3 TIMES DAILY PRN
Status: DISCONTINUED | OUTPATIENT
Start: 2022-01-01 | End: 2022-01-01

## 2022-01-01 RX ORDER — ACETAMINOPHEN 325 MG/1
650 TABLET ORAL EVERY 4 HOURS PRN
Status: DISCONTINUED | OUTPATIENT
Start: 2022-01-01 | End: 2022-01-01 | Stop reason: HOSPADM

## 2022-01-01 RX ORDER — LORAZEPAM 2 MG/ML
1 INJECTION INTRAMUSCULAR
Status: DISCONTINUED | OUTPATIENT
Start: 2022-01-01 | End: 2022-01-01 | Stop reason: HOSPADM

## 2022-01-01 RX ORDER — MORPHINE SULFATE 10 MG/5ML
2.5 SOLUTION ORAL
Status: DISCONTINUED | OUTPATIENT
Start: 2022-01-01 | End: 2022-01-01

## 2022-01-01 RX ORDER — LORAZEPAM 2 MG/ML
1 CONCENTRATE ORAL
Status: DISCONTINUED | OUTPATIENT
Start: 2022-01-01 | End: 2022-01-01 | Stop reason: HOSPADM

## 2022-01-01 RX ORDER — MORPHINE SULFATE 10 MG/5ML
5 SOLUTION ORAL
Status: ACTIVE | OUTPATIENT
Start: 2022-01-01 | End: 2022-01-01

## 2022-01-01 RX ORDER — ONDANSETRON 2 MG/ML
8 INJECTION INTRAMUSCULAR; INTRAVENOUS EVERY 8 HOURS PRN
Status: DISCONTINUED | OUTPATIENT
Start: 2022-01-01 | End: 2022-01-01 | Stop reason: HOSPADM

## 2022-01-01 RX ORDER — ACETAMINOPHEN 650 MG/1
650 SUPPOSITORY RECTAL EVERY 4 HOURS PRN
Status: DISCONTINUED | OUTPATIENT
Start: 2022-01-01 | End: 2022-01-01 | Stop reason: HOSPADM

## 2022-01-01 RX ORDER — MORPHINE SULFATE 10 MG/ML
10 INJECTION, SOLUTION INTRAMUSCULAR; INTRAVENOUS
Status: DISCONTINUED | OUTPATIENT
Start: 2022-01-01 | End: 2022-01-01 | Stop reason: HOSPADM

## 2022-01-01 RX ORDER — MORPHINE SULFATE 10 MG/ML
5 INJECTION, SOLUTION INTRAMUSCULAR; INTRAVENOUS
Status: DISCONTINUED | OUTPATIENT
Start: 2022-01-01 | End: 2022-01-01 | Stop reason: HOSPADM

## 2022-01-01 RX ORDER — ATROPINE SULFATE 10 MG/ML
2 SOLUTION/ DROPS OPHTHALMIC EVERY 4 HOURS PRN
Status: DISCONTINUED | OUTPATIENT
Start: 2022-01-01 | End: 2022-01-01 | Stop reason: HOSPADM

## 2022-01-01 RX ORDER — TRAMADOL HYDROCHLORIDE 50 MG/1
50-100 TABLET ORAL EVERY 6 HOURS PRN
Status: DISCONTINUED | OUTPATIENT
Start: 2022-01-01 | End: 2022-01-01

## 2022-01-01 RX ORDER — ONDANSETRON 4 MG/1
8 TABLET, ORALLY DISINTEGRATING ORAL EVERY 8 HOURS PRN
Status: DISCONTINUED | OUTPATIENT
Start: 2022-01-01 | End: 2022-01-01 | Stop reason: HOSPADM

## 2022-01-01 RX ORDER — POLYVINYL ALCOHOL 14 MG/ML
2 SOLUTION/ DROPS OPHTHALMIC EVERY 6 HOURS PRN
Status: DISCONTINUED | OUTPATIENT
Start: 2022-01-01 | End: 2022-01-01 | Stop reason: HOSPADM

## 2022-01-01 RX ADMIN — HEPARIN SODIUM 5000 UNITS: 5000 INJECTION, SOLUTION INTRAVENOUS; SUBCUTANEOUS at 13:48

## 2022-01-01 RX ADMIN — SENNOSIDES AND DOCUSATE SODIUM 2 TABLET: 50; 8.6 TABLET ORAL at 18:03

## 2022-01-01 RX ADMIN — ACETAMINOPHEN 650 MG: 325 TABLET, FILM COATED ORAL at 11:40

## 2022-01-01 RX ADMIN — MORPHINE SULFATE 10 MG: 10 INJECTION INTRAVENOUS at 03:02

## 2022-01-01 RX ADMIN — HEPARIN SODIUM 5000 UNITS: 5000 INJECTION, SOLUTION INTRAVENOUS; SUBCUTANEOUS at 05:34

## 2022-01-01 RX ADMIN — MORPHINE SULFATE 10 MG: 10 INJECTION INTRAVENOUS at 00:49

## 2022-01-01 RX ADMIN — Medication 125 MG: at 09:02

## 2022-01-01 RX ADMIN — HEPARIN SODIUM 5000 UNITS: 5000 INJECTION, SOLUTION INTRAVENOUS; SUBCUTANEOUS at 21:08

## 2022-01-01 RX ADMIN — Medication 125 MG: at 13:17

## 2022-01-01 RX ADMIN — HEPARIN SODIUM 5000 UNITS: 5000 INJECTION, SOLUTION INTRAVENOUS; SUBCUTANEOUS at 23:03

## 2022-01-01 RX ADMIN — OMEPRAZOLE 20 MG: 20 CAPSULE, DELAYED RELEASE ORAL at 05:04

## 2022-01-01 RX ADMIN — OMEPRAZOLE 20 MG: 20 CAPSULE, DELAYED RELEASE ORAL at 05:34

## 2022-01-01 RX ADMIN — HEPARIN SODIUM 5000 UNITS: 5000 INJECTION, SOLUTION INTRAVENOUS; SUBCUTANEOUS at 05:58

## 2022-01-01 RX ADMIN — Medication 125 MG: at 16:32

## 2022-01-01 RX ADMIN — SODIUM CHLORIDE: 9 INJECTION, SOLUTION INTRAVENOUS at 13:08

## 2022-01-01 RX ADMIN — OMEPRAZOLE 20 MG: 20 CAPSULE, DELAYED RELEASE ORAL at 05:57

## 2022-01-01 RX ADMIN — SODIUM CHLORIDE: 9 INJECTION, SOLUTION INTRAVENOUS at 02:54

## 2022-01-01 RX ADMIN — SENNOSIDES AND DOCUSATE SODIUM 2 TABLET: 50; 8.6 TABLET ORAL at 06:12

## 2022-01-01 RX ADMIN — SODIUM CHLORIDE: 9 INJECTION, SOLUTION INTRAVENOUS at 16:38

## 2022-01-01 RX ADMIN — MORPHINE SULFATE 2.5 MG: 10 SOLUTION ORAL at 20:58

## 2022-01-01 RX ADMIN — SENNOSIDES AND DOCUSATE SODIUM 2 TABLET: 50; 8.6 TABLET ORAL at 05:04

## 2022-01-01 RX ADMIN — SODIUM CHLORIDE: 9 INJECTION, SOLUTION INTRAVENOUS at 20:29

## 2022-01-01 RX ADMIN — HEPARIN SODIUM 5000 UNITS: 5000 INJECTION, SOLUTION INTRAVENOUS; SUBCUTANEOUS at 05:04

## 2022-01-01 RX ADMIN — TRAMADOL HYDROCHLORIDE 50 MG: 50 TABLET, FILM COATED ORAL at 15:25

## 2022-01-01 RX ADMIN — ATROPINE SULFATE 2 DROP: 10 SOLUTION/ DROPS OPHTHALMIC at 01:25

## 2022-01-01 RX ADMIN — PIPERACILLIN AND TAZOBACTAM 3.38 G: 3; .375 INJECTION, POWDER, LYOPHILIZED, FOR SOLUTION INTRAVENOUS; PARENTERAL at 17:30

## 2022-01-01 RX ADMIN — Medication 125 MG: at 11:40

## 2022-01-01 RX ADMIN — TRAMADOL HYDROCHLORIDE 50 MG: 50 TABLET, FILM COATED ORAL at 23:41

## 2022-01-01 RX ADMIN — OMEPRAZOLE 20 MG: 20 CAPSULE, DELAYED RELEASE ORAL at 06:12

## 2022-01-01 RX ADMIN — HEPARIN SODIUM 5000 UNITS: 5000 INJECTION, SOLUTION INTRAVENOUS; SUBCUTANEOUS at 14:36

## 2022-01-01 RX ADMIN — HEPARIN SODIUM 5000 UNITS: 5000 INJECTION, SOLUTION INTRAVENOUS; SUBCUTANEOUS at 14:18

## 2022-01-01 RX ADMIN — MORPHINE SULFATE 10 MG: 10 INJECTION INTRAVENOUS at 23:48

## 2022-01-01 RX ADMIN — CEFTRIAXONE SODIUM 2 G: 2 INJECTION, POWDER, FOR SOLUTION INTRAMUSCULAR; INTRAVENOUS at 11:29

## 2022-01-01 RX ADMIN — HEPARIN SODIUM 5000 UNITS: 5000 INJECTION, SOLUTION INTRAVENOUS; SUBCUTANEOUS at 06:12

## 2022-01-01 RX ADMIN — BISACODYL 10 MG: 10 SUPPOSITORY RECTAL at 08:13

## 2022-01-01 RX ADMIN — LORAZEPAM 1 MG: 2 LIQUID ORAL at 21:58

## 2022-01-01 RX ADMIN — MORPHINE SULFATE 10 MG: 10 INJECTION INTRAVENOUS at 22:31

## 2022-01-01 RX ADMIN — PIPERACILLIN AND TAZOBACTAM 3.38 G: 3; .375 INJECTION, POWDER, LYOPHILIZED, FOR SOLUTION INTRAVENOUS; PARENTERAL at 16:16

## 2022-01-01 RX ADMIN — MORPHINE SULFATE 10 MG: 10 INJECTION INTRAVENOUS at 02:00

## 2022-01-01 RX ADMIN — TRAMADOL HYDROCHLORIDE 100 MG: 50 TABLET, FILM COATED ORAL at 15:20

## 2022-01-01 RX ADMIN — CARVEDILOL 12.5 MG: 6.25 TABLET, FILM COATED ORAL at 08:22

## 2022-01-01 RX ADMIN — SODIUM CHLORIDE: 9 INJECTION, SOLUTION INTRAVENOUS at 15:12

## 2022-01-01 RX ADMIN — HEPARIN SODIUM 5000 UNITS: 5000 INJECTION, SOLUTION INTRAVENOUS; SUBCUTANEOUS at 13:08

## 2022-01-01 RX ADMIN — SODIUM CHLORIDE: 9 INJECTION, SOLUTION INTRAVENOUS at 08:22

## 2022-01-01 RX ADMIN — ACETAMINOPHEN 650 MG: 325 TABLET, FILM COATED ORAL at 10:34

## 2022-01-01 RX ADMIN — ACETAMINOPHEN 650 MG: 325 TABLET, FILM COATED ORAL at 08:31

## 2022-01-01 RX ADMIN — SODIUM CHLORIDE: 9 INJECTION, SOLUTION INTRAVENOUS at 02:40

## 2022-01-01 RX ADMIN — HEPARIN SODIUM 5000 UNITS: 5000 INJECTION, SOLUTION INTRAVENOUS; SUBCUTANEOUS at 22:13

## 2022-01-01 RX ADMIN — TRAMADOL HYDROCHLORIDE 50 MG: 50 TABLET, FILM COATED ORAL at 08:27

## 2022-01-01 ASSESSMENT — PAIN DESCRIPTION - PAIN TYPE
TYPE: ACUTE PAIN

## 2022-01-01 ASSESSMENT — LIFESTYLE VARIABLES
HAVE PEOPLE ANNOYED YOU BY CRITICIZING YOUR DRINKING: NO
EVER HAD A DRINK FIRST THING IN THE MORNING TO STEADY YOUR NERVES TO GET RID OF A HANGOVER: NO
ON A TYPICAL DAY WHEN YOU DRINK ALCOHOL HOW MANY DRINKS DO YOU HAVE: 0
TOTAL SCORE: 0
TOTAL SCORE: 0
EVER FELT BAD OR GUILTY ABOUT YOUR DRINKING: NO
TOTAL SCORE: 0
HAVE YOU EVER FELT YOU SHOULD CUT DOWN ON YOUR DRINKING: NO
CONSUMPTION TOTAL: NEGATIVE
HOW MANY TIMES IN THE PAST YEAR HAVE YOU HAD 5 OR MORE DRINKS IN A DAY: 0
AVERAGE NUMBER OF DAYS PER WEEK YOU HAVE A DRINK CONTAINING ALCOHOL: 0
ALCOHOL_USE: NO

## 2022-01-01 ASSESSMENT — ENCOUNTER SYMPTOMS
COUGH: 0
SORE THROAT: 0
VOMITING: 0
WEAKNESS: 1
HEARTBURN: 0
DEPRESSION: 0
PALPITATIONS: 0
COUGH: 0
CONSTIPATION: 0
NERVOUS/ANXIOUS: 0
BACK PAIN: 0
INSOMNIA: 0
ABDOMINAL PAIN: 0
CLAUDICATION: 0
SPEECH CHANGE: 0
HEADACHES: 0
WEAKNESS: 1
BLURRED VISION: 0
SENSORY CHANGE: 0
VOMITING: 0
PHOTOPHOBIA: 0
DIARRHEA: 0
SHORTNESS OF BREATH: 0
SHORTNESS OF BREATH: 0
VOMITING: 0
CLAUDICATION: 0
DIZZINESS: 0
INSOMNIA: 0
HEARTBURN: 0
ABDOMINAL PAIN: 1
DIZZINESS: 0
CHILLS: 0
DIZZINESS: 0
FEVER: 0
FEVER: 0
MYALGIAS: 0
MYALGIAS: 0
SENSORY CHANGE: 0
BACK PAIN: 1
SENSORY CHANGE: 0
SPEECH CHANGE: 0
NERVOUS/ANXIOUS: 0
CLAUDICATION: 0
SPEECH CHANGE: 0
ABDOMINAL PAIN: 0
COUGH: 0
SHORTNESS OF BREATH: 0
VOMITING: 0
NERVOUS/ANXIOUS: 0
HEADACHES: 0
WEAKNESS: 1
BACK PAIN: 1
HEADACHES: 0
CONSTIPATION: 0
CONSTIPATION: 0
DIARRHEA: 0
DEPRESSION: 0
PHOTOPHOBIA: 0
FEVER: 0
ABDOMINAL PAIN: 0
HEADACHES: 0
WEAKNESS: 1
FEVER: 0
BLURRED VISION: 0
SHORTNESS OF BREATH: 0
CHILLS: 0
SORE THROAT: 0
CONSTIPATION: 0
BLURRED VISION: 0
CHILLS: 0
NAUSEA: 1
SORE THROAT: 0
BACK PAIN: 1
DIARRHEA: 0
CHILLS: 0
COUGH: 0
PHOTOPHOBIA: 0
DEPRESSION: 0
MYALGIAS: 0
DIZZINESS: 0
INSOMNIA: 0
HEARTBURN: 0

## 2022-01-01 ASSESSMENT — COGNITIVE AND FUNCTIONAL STATUS - GENERAL
TOILETING: A LITTLE
HELP NEEDED FOR BATHING: A LOT
DRESSING REGULAR LOWER BODY CLOTHING: A LOT
STANDING UP FROM CHAIR USING ARMS: A LITTLE
MOBILITY SCORE: 16
MOVING TO AND FROM BED TO CHAIR: A LITTLE
SUGGESTED CMS G CODE MODIFIER DAILY ACTIVITY: CK
SUGGESTED CMS G CODE MODIFIER MOBILITY: CK
PERSONAL GROOMING: A LITTLE
WALKING IN HOSPITAL ROOM: A LITTLE
DAILY ACTIVITIY SCORE: 17
MOVING FROM LYING ON BACK TO SITTING ON SIDE OF FLAT BED: A LITTLE
DRESSING REGULAR UPPER BODY CLOTHING: A LITTLE
TURNING FROM BACK TO SIDE WHILE IN FLAT BAD: A LITTLE
CLIMB 3 TO 5 STEPS WITH RAILING: TOTAL

## 2022-01-01 ASSESSMENT — PATIENT HEALTH QUESTIONNAIRE - PHQ9
2. FEELING DOWN, DEPRESSED, IRRITABLE, OR HOPELESS: NOT AT ALL
SUM OF ALL RESPONSES TO PHQ9 QUESTIONS 1 AND 2: 0
1. LITTLE INTEREST OR PLEASURE IN DOING THINGS: NOT AT ALL

## 2022-01-01 ASSESSMENT — ACTIVITIES OF DAILY LIVING (ADL): TOILETING: INDEPENDENT

## 2022-01-01 ASSESSMENT — FIBROSIS 4 INDEX
FIB4 SCORE: 5.04
FIB4 SCORE: 4.32

## 2022-01-01 ASSESSMENT — GAIT ASSESSMENTS
GAIT LEVEL OF ASSIST: MINIMAL ASSIST
DEVIATION: ANTALGIC;STEP TO;DECREASED HEEL STRIKE;DECREASED TOE OFF
DISTANCE (FEET): 20
ASSISTIVE DEVICE: FRONT WHEEL WALKER
DISTANCE (FEET): 20

## 2022-01-01 NOTE — H&P
Hospital Medicine History & Physical Note    Date of Service  12/31/2021    Primary Care Physician  Titus Cannon M.D.    Consultants  None     Code Status  Full Code    Chief Complaint  Chief Complaint   Patient presents with   • N/V     BIB [facility] from home report patient fell December 11, 2021 and backwards on her butt. Patient continuous to have low back pain, nausea and vomiting since then causing her not to eat.  The home health nurse came to visit her today and an IV 22g LAC estblished with blood drawn, the home health notice the potassium and creatinine was elevated.  Potassium at 5.8 and creatinine 4.5.    • Low Back Pain   • Fall     History of Presenting Illness  Shira Colón is a 87 y.o. female with a past medical history of hypertension, gastroesophageal reflux disease, chronic kidney disease stage III, gout who presented 12/31/2021 with nausea vomiting, falls and back pain.  Patient reports that she has been having multiple falls over the past 3 weeks.  Since then she has been having lower back pain.  Pain is described as moderate-severe.  Rated as 6-9.  Worse with movement no relieving factors.  No radiation to other places.  Patient denies having fevers cough.  She also has been noticing reduced appetite and nausea with a few episodes of emesis.  No blood in vomitus.     I discussed the plan of care with  emergency department physician, the patient and patient daughter, Jodi, was present at bedside.    Review of Systems  Review of Systems   Constitutional: Positive for malaise/fatigue. Negative for chills and fever.   Eyes: Negative for discharge and redness.   Respiratory: Negative for cough, shortness of breath and stridor.    Cardiovascular: Negative for chest pain and leg swelling.   Gastrointestinal: Negative for abdominal pain and vomiting.   Genitourinary: Negative for flank pain.   Musculoskeletal: Positive for back pain and falls. Negative for myalgias.   Skin: Negative.   "  Neurological: Negative for focal weakness.   Endo/Heme/Allergies: Does not bruise/bleed easily.   Psychiatric/Behavioral: The patient is not nervous/anxious.      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    Surgical History   has a past surgical history that includes tonsillectomy; hysterectomy, vaginal; breast biopsy; gastroscopy with biopsy (5/7/2014); colonoscopy with clipping (5/7/2014); rectocele repair; recovery (11/4/2014); recovery (4/27/2016); recovery (5/19/2016); pacemaker insertion (December 2016); orif, finger (Right, 6/12/2020); and percutaneous pinning (Right, 6/12/2020).     Family History  family history includes Heart Disease in her brother.      Social History   reports that she has never smoked. She has never used smokeless tobacco. She reports current alcohol use. She reports that she does not use drugs.    Allergies  Allergies   Allergen Reactions   • Methotrexate      Pt. States it effects her liver.   • Remicade [Infliximab Injection]      Shaking/hot-flashes   • Sulfites Diarrhea   • Other Environmental Runny Nose and Itching     Plants; \"everything\"     Medications  Prior to Admission Medications   Prescriptions Last Dose Informant Patient Reported? Taking?   Calcium Carbonate-Vitamin D (CALTRATE 600+D PO) 12/29/2021 at [facility] Patient Yes No   Sig: Take 1 Tab by mouth 2 Times a Day.   Multiple Vitamins-Minerals (CENTRUM SILVER " PO) 12/29/2021 at [facility] Patient Yes No   Sig: Take 1 Tab by mouth every day.   Naproxen Sodium (ALEVE) 220 MG Cap FEW DAYS AGO at [facility] Patient Yes No   Sig: Take 220 mg by mouth 2 times a day as needed (Pain).   TOCILIZUMAB IV K at K Patient Yes No   Sig: Infuse 1 Dose into a venous catheter every 6 months.   acetaminophen (TYLENOL) 500 MG Tab 12/30/2021 at [facility] Patient Yes Yes   Sig: Take 500-1,000 mg by mouth every 6 hours as needed. Indications: Pain   allopurinol (ZYLOPRIM) 100 MG Tab 12/29/2021 at [facility] Patient Yes No   Sig: Take 100 mg by mouth every day.   carvedilol (COREG) 12.5 MG Tab 12/29/2021 at [facility] Patient No No   Sig: Take 1 Tablet by mouth 2 times a day with meals.   estradiol (ESTRACE) 0.5 MG tablet 12/29/2021 at [facility] Patient No No   Sig: Take 1 Tablet by mouth every day.   furosemide (LASIX) 20 MG Tab 12/29/2021 at [facility] Patient No No   Sig: Take 1 Tablet by mouth every day.   lisinopril (PRINIVIL) 10 MG Tab 12/29/2021 at [facility] Patient No No   Sig: TAKE 1 TABLET DAILY   Patient taking differently: Take 10 mg by mouth every day. TAKE 1 TABLET DAILY   nitroglycerin (NITROSTAT) 0.4 MG SUBL PRN at [facility] Patient No No   Sig: Place 1 Tab under tongue as needed for Chest Pain.   omeprazole (PRILOSEC) 20 MG delayed-release capsule 12/29/2021 at [facility] Patient Yes No   Sig: Take 20 mg by mouth every day.   oxyCODONE-acetaminophen (PERCOCET) 5-325 MG Tab FEW DAYS AGO at [facility] Patient No No   Sig: Take 1 Tablet by mouth every 6 hours as needed for Moderate Pain or Severe Pain for up to 15 days. 60 tablets is a 15 day quantity.   potassium chloride ER (KLOR-CON) 10 MEQ tablet 12/29/2021 at [facility] Patient No No   Sig: TAKE 1 TABLET DAILY   Patient taking differently: Take 10 mEq by mouth every day. TAKE 1 TABLET DAILY      Facility-Administered Medications: None     Physical Exam  Temp:  [36.3 °C (97.3 °F)-36.9 °C (98.4 °F)] 36.9 °C (98.4 °F)  Pulse:  [90-96] 92  Resp:  [18-22] 18  BP: ()/(49-59) 106/54  SpO2:  [91 %-97 %]  95 %  Blood Pressure : 107/52   Temperature: 36.3 °C (97.3 °F)   Pulse: 96   Respiration: (!) 22   Pulse Oximetry: 91 %     Physical Exam  Constitutional:       General: She is not in acute distress.  HENT:      Head: Normocephalic and atraumatic.      Right Ear: External ear normal.      Left Ear: External ear normal.      Nose: No congestion or rhinorrhea.      Mouth/Throat:      Mouth: Mucous membranes are dry.      Pharynx: No oropharyngeal exudate or posterior oropharyngeal erythema.   Eyes:      General: No scleral icterus.        Right eye: No discharge.         Left eye: No discharge.      Conjunctiva/sclera: Conjunctivae normal.      Pupils: Pupils are equal, round, and reactive to light.   Cardiovascular:      Rate and Rhythm: Tachycardia present.      Heart sounds: No friction rub. No gallop.    Pulmonary:      Effort: Pulmonary effort is normal.   Abdominal:      General: Abdomen is flat. There is no distension.      Tenderness: There is no guarding.   Musculoskeletal:         General: No swelling.      Cervical back: Neck supple. No rigidity. No muscular tenderness.      Right lower leg: No edema.      Left lower leg: No edema.   Skin:     General: Skin is dry.      Capillary Refill: Capillary refill takes 2 to 3 seconds.      Coloration: Skin is pale. Skin is not jaundiced.      Findings: No bruising or erythema.   Neurological:      Mental Status: She is alert and oriented to person, place, and time.   Psychiatric:         Mood and Affect: Mood normal.       Laboratory:  Recent Labs     12/31/21 1555   WBC 14.3*   RBC 4.63   HEMOGLOBIN 14.5   HEMATOCRIT 43.7   MCV 94.4   MCH 31.3   MCHC 33.2*   RDW 49.9   PLATELETCT 156*   MPV 10.2     Recent Labs     12/31/21  1555 12/31/21 2035   SODIUM 137 135   POTASSIUM 6.2* 5.5   CHLORIDE 99 100   CO2 23 21   GLUCOSE 107* 133*   BUN 84* 83*   CREATININE 4.73* 4.25*   CALCIUM 9.2 9.0     Recent Labs     12/31/21  1555 12/31/21 2035   ALTSGPT 17  --    ASTSGOT  37  --    ALKPHOSPHAT 110*  --    TBILIRUBIN 0.6  --    LIPASE 14  --    GLUCOSE 107* 133*         No results for input(s): NTPROBNP in the last 72 hours.      No results for input(s): TROPONINT in the last 72 hours.    Imaging:  CT-LSPINE W/O PLUS RECONS   Final Result      1.  Sacral fractures seen involving the dorsal aspect of S1 segment, with comminuted and displaced fractures involving S2 sacral segment extending into RIGHT sacral ala.   2.  Probable chronic moderate to severe compression of L1.   3.  Multilevel degenerative change of lumbar spine with associated mild anterolisthesis at L4-5.        I personally reviewed patient's EKG shows wide complex regular tachycardia with a rate of 95, appears to be V paced, no ST elevation, QTc 468.    Assessment/Plan:  I anticipate this patient will require at least two midnights for appropriate medical management, necessitating inpatient admission.    * Hyperkalemia- (present on admission)  Assessment & Plan  Likely secondary to acute kidney injury  EKG shows wide complex regular tachycardia with a rate of 95, appears to be V paced, no ST elevation, QTc 468.  Insulin, dextrose, calcium gluconate, sodium bicarb  Continuous cardiac monitoring.  Continue to monitor with frequent BMPs     Sacral fractures- (present on admission)  Assessment & Plan  Imaging shows evidence for sacral fractures seen involving the dorsal aspect of S1 segment, with comminuted and displaced fractures involving S2 sacral segment extending into RIGHT sacral ala.   Multimodal pain control.  Likely nonoperative treatment, consider Ortho consult a.m.    Acute kidney injury on pre-existing chronic kidney disease stage IIIa- (present on admission)  Assessment & Plan  Likely prerenal due to  dehydration   Intravenous fluids  Avoid nephrotoxins, monitor inputs and outputs  I will check renal ultrasound and bladder scan     Essential hypertension- (present on admission)  Assessment & Plan  Resume home  carvedilol with hold parameters.  We will hold her on furosemide and lisinopril given her acute kidney injury    Stage 3a chronic kidney disease (HCC)- (present on admission)  Assessment & Plan  With acute kidney injury.  Avoid nephrotoxins as much as possible, renally dose medications, monitor inputs and outputs     Hx of gout- (present on admission)  Assessment & Plan  Hold home allopurinol given her acute kidney injury    Gastroesophageal reflux disease without esophagitis- (present on admission)  Assessment & Plan  Resume home omeprazole    VTE prophylaxis: SCDs/TEDs and heparin ppx

## 2022-01-01 NOTE — PROGRESS NOTES
Patient arrived to the floor at 1840. Patient settled in the room. Tele box hooked up and hand-off report given to night shift.

## 2022-01-01 NOTE — ASSESSMENT & PLAN NOTE
Resume home carvedilol with hold parameters.  We will hold her on furosemide and lisinopril given her acute kidney injury

## 2022-01-01 NOTE — ED NOTES
Assist RN: IV NS initiated and infusing as ordered.  ERP at bedside updated patient and family member on her plan of care, results and disposition.  Pending admit orders and a bed assignment

## 2022-01-01 NOTE — PROGRESS NOTES
Telemetry Shift Summary     Rhythm SR V-paced  HR Range 92-95  Ectopy rPVC, rBig  Measurements .16/0.16/.40        Normal Values  Rhythm SR  HR Range:   Measurements: 0.12-0.20/0.06-0.10/0.30-0.52

## 2022-01-01 NOTE — ASSESSMENT & PLAN NOTE
With acute kidney injury.  Avoid nephrotoxins as much as possible, renally dose medications, monitor inputs and outputs

## 2022-01-01 NOTE — PROGRESS NOTES
[facility] Medicine Daily Progress Note    Date of Service  1/1/2022    Chief Complaint  Shira Colón is a 87 y.o. female admitted 12/31/2021 with weakness and back pain.    Hospital Course  Shira Colón is a 87 y.o. female with a past medical history of hypertension, gastroesophageal reflux disease, chronic kidney disease stage III, gout who presented 12/31/2021 with nausea vomiting, falls and back pain.  Patient reports that she has been having multiple falls over the past 3 weeks.  Since then she has been having lower back pain.  Pain is described as moderate-severe.   She also has been noticing reduced appetite and nausea with a few episodes of emesis since she has been falling.  Imaging shows sacral fractures.          Interval Problem Update  1/1 Patient states she feels okay but not significantly better since admission.  PT/OT evaluations pending for dispo recommendations.  I anticipate placement will be needed and patient is willing.  Renal function has not yet improved with IV hydration.  I've increased the rate of IVF to 100cc/h.  Renal US pending but she is urinating without issue.    I have personally seen and examined the patient at bedside. I discussed the plan of care with patient, bedside RN, charge RN,  and pharmacy.    Consultants/Specialty  none    Code Status  Full Code    Disposition  Patient is not medically cleared for discharge.   Anticipate discharge to to skilled nursing facility.  I have placed the appropriate orders for post-discharge needs.    Review of Systems  Review of Systems   Constitutional: Negative for chills and fever.   HENT: Negative for congestion and sore throat.    Eyes: Negative for blurred vision and photophobia.   Respiratory: Negative for cough and shortness of breath.    Cardiovascular: Negative for chest pain, claudication and leg swelling.   Gastrointestinal: Negative for abdominal pain, constipation, diarrhea, heartburn and vomiting.    Genitourinary: Negative for dysuria and hematuria.   Musculoskeletal: Positive for back pain. Negative for joint pain and myalgias.   Skin: Negative for itching and rash.   Neurological: Positive for weakness. Negative for dizziness, sensory change, speech change and headaches.   Psychiatric/Behavioral: Negative for depression. The patient is not nervous/anxious and does not have insomnia.         Physical Exam  Temp:  [36.3 °C (97.3 °F)-36.9 °C (98.4 °F)] 36.3 °C (97.4 °F)  Pulse:  [80-96] 80  Resp:  [18-22] 18  BP: ()/(42-59) 87/42  SpO2:  [91 %-97 %] 97 %    Physical Exam  Vitals and nursing note reviewed.   Constitutional:       General: She is not in acute distress.     Appearance: Normal appearance. She is not ill-appearing.   HENT:      Head: Normocephalic and atraumatic.      Nose: Nose normal.   Eyes:      General: No scleral icterus.  Cardiovascular:      Rate and Rhythm: Normal rate and regular rhythm.      Heart sounds: Normal heart sounds. No murmur heard.      Pulmonary:      Effort: Pulmonary effort is normal.      Breath sounds: Normal breath sounds.   Abdominal:      General: Bowel sounds are normal. There is no distension.      Palpations: Abdomen is soft.   Musculoskeletal:         General: No swelling or tenderness.      Cervical back: Neck supple.   Skin:     General: Skin is warm and dry.   Neurological:      General: No focal deficit present.      Mental Status: She is alert and oriented to person, place, and time.   Psychiatric:         Mood and Affect: Mood normal.         Fluids    Intake/Output Summary (Last 24 hours) at 1/1/2022 1147  Last data filed at 1/1/2022 0600  Gross per 24 hour   Intake 1292.02 ml   Output --   Net 1292.02 ml       Laboratory  Recent Labs     12/31/21  1555 12/31/21  2354   WBC 14.3* 10.6   RBC 4.63 4.19*   HEMOGLOBIN 14.5 13.0   HEMATOCRIT 43.7 39.8   MCV 94.4 95.0   MCH 31.3 31.0   MCHC 33.2* 32.7*   RDW 49.9 49.6   PLATELETCT 156* 143*   MPV 10.2 10.5      Recent Labs     12/31/21  1555 12/31/21  2035 12/31/21  2354   SODIUM 137 135 133*  133*   POTASSIUM 6.2* 5.5 5.7*  5.7*   CHLORIDE 99 100 100  100   CO2 23 21 20  20   GLUCOSE 107* 133* 106*  106*   BUN 84* 83* 83*  83*   CREATININE 4.73* 4.25* 4.24*  4.23*   CALCIUM 9.2 9.0 8.8  8.7                   Imaging  CT-LSPINE W/O PLUS RECONS   Final Result      1.  Sacral fractures seen involving the dorsal aspect of S1 segment, with comminuted and displaced fractures involving S2 sacral segment extending into RIGHT sacral ala.   2.  Probable chronic moderate to severe compression of L1.   3.  Multilevel degenerative change of lumbar spine with associated mild anterolisthesis at L4-5.      US-RENAL    (Results Pending)        Assessment/Plan  * Hyperkalemia- (present on admission)  Assessment & Plan  K 5.7  Increase NS to 100cc/h  Likely secondary to acute kidney injury  EKG shows wide complex regular tachycardia with a rate of 95, appears to be V paced, no ST elevation, QTc 468.  Insulin, dextrose, calcium gluconate, sodium bicarb received in the ED  Continuous cardiac monitoring.  Continue to monitor with frequent BMPs     Sacral fractures- (present on admission)  Assessment & Plan  Imaging shows evidence for sacral fractures seen involving the dorsal aspect of S1 segment, with comminuted and displaced fractures involving S2 sacral segment extending into RIGHT sacral ala.   Multimodal pain control.  WBAT  PT/OT evaluations      Acute kidney injury on pre-existing chronic kidney disease stage IIIa- (present on admission)  Assessment & Plan  Likely prerenal due to  dehydration   Intravenous fluids  Avoid nephrotoxins, monitor inputs and outputs  Ordered renal ultrasound    Hx of gout- (present on admission)  Assessment & Plan  Hold home allopurinol given her acute kidney injury    Essential hypertension- (present on admission)  Assessment & Plan  Resume home carvedilol with hold parameters.  We will hold her  on furosemide and lisinopril given her acute kidney injury    Gastroesophageal reflux disease without esophagitis- (present on admission)  Assessment & Plan  Resume home omeprazole    Stage 3a chronic kidney disease (HCC)- (present on admission)  Assessment & Plan  With acute kidney injury.  Avoid nephrotoxins as much as possible, renally dose medications, monitor inputs and outputs          VTE prophylaxis: heparin ppx    I have performed a physical exam and reviewed and updated ROS and Plan today (1/1/2022). In review of yesterday's note (12/31/2021), there are no changes except as documented above.

## 2022-01-01 NOTE — CARE PLAN
The patient is Stable - Low risk of patient condition declining or worsening    Shift Goals  Clinical Goals: decrease K  Patient Goals: comfort    Progress made toward(s) clinical / shift goals:    Problem: Urinary Elimination:  Goal: Ability to achieve a balanced intake and output will improve  Outcome: Not Progressing     Problem: Knowledge Deficit - Standard  Goal: Patient and family/care givers will demonstrate understanding of plan of care, disease process/condition, diagnostic tests and medications  Outcome: Progressing     Problem: Urinary Elimination:  Goal: Ability to achieve and maintain adequate renal perfusion and functioning will improve  Outcome: Progressing       Patient is not progressing towards the following goals:      Problem: Urinary Elimination:  Goal: Ability to achieve a balanced intake and output will improve  Outcome: Not Progressing

## 2022-01-01 NOTE — HOSPITAL COURSE
Shira Colón is a 87 y.o. female with a past medical history of hypertension, gastroesophageal reflux disease, chronic kidney disease stage III, gout who presented 12/31/2021 with nausea vomiting, falls and back pain.  Patient reports that she has been having multiple falls over the past 3 weeks.  Since then she has been having lower back pain.  Pain is described as moderate-severe.   She also has been noticing reduced appetite and nausea with a few episodes of emesis since she has been falling.  Imaging shows sacral fractures.

## 2022-01-01 NOTE — ASSESSMENT & PLAN NOTE
Improving cr  Intravenous fluids to continue at slower rate  Avoid nephrotoxins, monitor inputs and outputs  Renal ultrasound showed age related changes, no obstructions/hydro.

## 2022-01-01 NOTE — ASSESSMENT & PLAN NOTE
Imaging shows evidence for sacral fractures seen involving the dorsal aspect of S1 segment, with comminuted and displaced fractures involving S2 sacral segment extending into RIGHT sacral ala.   Multimodal pain control, avoid narcotics  Tylenol and tramadol prn  WBAT  PT/OT evaluations, okay with home health as patient has great family support at home.   Wheelchair recommended - will order.

## 2022-01-02 PROBLEM — J90 PLEURAL EFFUSION: Status: ACTIVE | Noted: 2022-01-01

## 2022-01-02 NOTE — PROGRESS NOTES
Telemetry Shift Summary     Rhythm Paced  HR Range 90s  Ectopy R-PVC  Measurements 0.18/0.16/0.40           Normal Values  Rhythm SR  HR Range    Measurements 0.12-0.20 / 0.06-0.10  / 0.30-0.52

## 2022-01-02 NOTE — PROGRESS NOTES
Received bedside report from ROSITA Duncan, pt care assumed. VSS, pt assessment complete. Pt A&Ox4, moderate c/o pain at this time, PRN pain medications given per MAR. POC discussed with pt and verbalizes no questions. Pt denies any additional needs at this time. Bed locked and in lowest position, bed alarm is on. Pt educated on fall risk and verbalized understanding, call light within reach, hourly rounding initiated.

## 2022-01-02 NOTE — CARE PLAN
The patient is Stable - Low risk of patient condition declining or worsening    Shift Goals  Clinical Goals: Improve kidney function, IVF  Patient Goals: Rest    Progress made toward(s) clinical / shift goals:    Problem: Knowledge Deficit - Standard  Goal: Patient and family/care givers will demonstrate understanding of plan of care, disease process/condition, diagnostic tests and medications  Outcome: Progressing     Problem: Psychosocial Needs:  Goal: Ability to cope will improve  Outcome: Progressing     Problem: Urinary Elimination:  Goal: Ability to achieve and maintain adequate renal perfusion and functioning will improve  Outcome: Progressing  Goal: Ability to achieve a balanced intake and output will improve  Outcome: Progressing       Patient is not progressing towards the following goals:

## 2022-01-02 NOTE — PROGRESS NOTES
[facility] Medicine Daily Progress Note    Date of Service  1/2/2022    Chief Complaint  Shira Colón is a 87 y.o. female admitted 12/31/2021 with weakness and back pain.    Hospital Course  Shira Colón is a 87 y.o. female with a past medical history of hypertension, gastroesophageal reflux disease, chronic kidney disease stage III, gout who presented 12/31/2021 with nausea vomiting, falls and back pain.  Patient reports that she has been having multiple falls over the past 3 weeks.  Since then she has been having lower back pain.  Pain is described as moderate-severe.   She also has been noticing reduced appetite and nausea with a few episodes of emesis since she has been falling.  Imaging shows sacral fractures.          Interval Problem Update  1/1 Patient states she feels okay but not significantly better since admission.  PT/OT evaluations pending for dispo recommendations.  I anticipate placement will be needed and patient is willing.  Renal function has not yet improved with IV hydration.  I've increased the rate of IVF to 100cc/h.  Renal US pending but she is urinating without issue.  1/2 Patient finally sleeping soundly, was having increased pain per RN.  Tramadol PRN ordered for increased pain.  Spoke with daughter at bedside, patient has good support at home as she lives with her son and daughter is here from [place] also helping out.  Pleural effusion seen on renal US so I ordered CXR which confirms bilateral mld effusions, will decrease IVF rate to 50cc/hour.  Renal function improving and elevated K resolved.     I have personally seen and examined the patient at bedside. I discussed the plan of care with patient, bedside RN, charge RN,  and pharmacy.    Consultants/Specialty  none    Code Status  Full Code    Disposition  Patient is not medically cleared for discharge.   Anticipate discharge to to skilled nursing facility.  I have placed the appropriate orders for post-discharge  needs.    Review of Systems  Review of Systems   Constitutional: Negative for chills and fever.   HENT: Negative for congestion and sore throat.    Eyes: Negative for blurred vision and photophobia.   Respiratory: Negative for cough and shortness of breath.    Cardiovascular: Negative for chest pain, claudication and leg swelling.   Gastrointestinal: Negative for abdominal pain, constipation, diarrhea, heartburn and vomiting.   Genitourinary: Negative for dysuria and hematuria.   Musculoskeletal: Positive for back pain. Negative for joint pain and myalgias.   Skin: Negative for itching and rash.   Neurological: Positive for weakness. Negative for dizziness, sensory change, speech change and headaches.   Psychiatric/Behavioral: Negative for depression. The patient is not nervous/anxious and does not have insomnia.         Physical Exam  Temp:  [36.4 °C (97.6 °F)-36.8 °C (98.3 °F)] 36.6 °C (97.8 °F)  Pulse:  [82-93] 86  Resp:  [16-18] 18  BP: (102-115)/(43-67) 108/54  SpO2:  [94 %-97 %] 94 %    Physical Exam  Vitals and nursing note reviewed.   Constitutional:       General: She is not in acute distress.     Appearance: Normal appearance. She is not ill-appearing.   HENT:      Head: Normocephalic and atraumatic.      Nose: Nose normal.   Eyes:      General: No scleral icterus.  Cardiovascular:      Rate and Rhythm: Normal rate and regular rhythm.      Heart sounds: Normal heart sounds. No murmur heard.      Pulmonary:      Effort: Pulmonary effort is normal.      Breath sounds: Normal breath sounds.   Abdominal:      General: Bowel sounds are normal. There is no distension.      Palpations: Abdomen is soft.   Musculoskeletal:         General: No swelling or tenderness.      Cervical back: Neck supple.   Skin:     General: Skin is warm and dry.   Neurological:      General: No focal deficit present.      Mental Status: She is alert and oriented to person, place, and time.   Psychiatric:         Mood and Affect: Mood  normal.         Fluids    Intake/Output Summary (Last 24 hours) at 1/2/2022 1215  Last data filed at 1/1/2022 1400  Gross per 24 hour   Intake 120 ml   Output 150 ml   Net -30 ml       Laboratory  Recent Labs     12/31/21  1555 12/31/21 2354 01/02/22  0230   WBC 14.3* 10.6 7.7   RBC 4.63 4.19* 4.22   HEMOGLOBIN 14.5 13.0 13.3   HEMATOCRIT 43.7 39.8 40.7   MCV 94.4 95.0 96.4   MCH 31.3 31.0 31.5   MCHC 33.2* 32.7* 32.7*   RDW 49.9 49.6 50.5*   PLATELETCT 156* 143* 118*   MPV 10.2 10.5 10.2     Recent Labs     12/31/21 2035 12/31/21 2354 01/02/22  0230   SODIUM 135 133*  133* 136   POTASSIUM 5.5 5.7*  5.7* 4.9   CHLORIDE 100 100  100 104   CO2 21 20  20 19*   GLUCOSE 133* 106*  106* 116*   BUN 83* 83*  83* 82*   CREATININE 4.25* 4.24*  4.23* 3.29*   CALCIUM 9.0 8.8  8.7 8.4                   Imaging  DX-CHEST-PORTABLE (1 VIEW)   Final Result      Bibasilar interstitial opacities which may represent interstitial edema with small bilateral pleural effusions. Findings may be accentuated by shallow inspiration.      US-RENAL   Final Result      No hydronephrosis      Mild renal cortical thinning is compatible with age related atrophy      Small-medium volume ascites appears slightly nonsimple and could be due to late subacute hemorrhage. Infectious origin is not excluded. The volume of ascites is likely too small to aspirate      Small right pleural effusion      CT-LSPINE W/O PLUS RECONS   Final Result      1.  Sacral fractures seen involving the dorsal aspect of S1 segment, with comminuted and displaced fractures involving S2 sacral segment extending into RIGHT sacral ala.   2.  Probable chronic moderate to severe compression of L1.   3.  Multilevel degenerative change of lumbar spine with associated mild anterolisthesis at L4-5.           Assessment/Plan  * Hyperkalemia- (present on admission)  Assessment & Plan  Resolved  Decrease IVF rate due to pleural effusions      Pleural effusion  Assessment &  Plan  Mild bilateral on CXR  Reduce IV fluid resuscitation as renal function is improving   Maintaining adequate oxygenation on 2L.      Sacral fractures- (present on admission)  Assessment & Plan  Imaging shows evidence for sacral fractures seen involving the dorsal aspect of S1 segment, with comminuted and displaced fractures involving S2 sacral segment extending into RIGHT sacral ala.   Multimodal pain control, avoid narcotics  Tylenol and tramadol prn  WBAT  PT/OT evaluations      Acute kidney injury on pre-existing chronic kidney disease stage IIIa- (present on admission)  Assessment & Plan  Improving cr  Intravenous fluids to continue at slower rate  Avoid nephrotoxins, monitor inputs and outputs  Renal ultrasound showed age related changes, no obstructions/hydro.    Hx of gout- (present on admission)  Assessment & Plan  Hold home allopurinol given her acute kidney injury    Essential hypertension- (present on admission)  Assessment & Plan  Resume home carvedilol with hold parameters.  We will hold her on furosemide and lisinopril given her acute kidney injury    Gastroesophageal reflux disease without esophagitis- (present on admission)  Assessment & Plan  Resume home omeprazole    Stage 3a chronic kidney disease (HCC)- (present on admission)  Assessment & Plan  With acute kidney injury.  Avoid nephrotoxins as much as possible, renally dose medications, monitor inputs and outputs        VTE prophylaxis: heparin ppx    I have performed a physical exam and reviewed and updated ROS and Plan today (1/2/2022). In review of yesterday's note (1/1/2022), there are no changes except as documented above.

## 2022-01-02 NOTE — DISCHARGE PLANNING
Anticipated Discharge Disposition: SNF vs home with home health.    Action: LSW met with patient and patient's daughter, Virginie, at bedside to discuss discharge plan. Patient is pending PT/OT and they would prefer to have patient evaluated by therapy before making SNF choice. Their preference is to bring patient home with home health. LSW provided patient and daughter with SNF list for their review.    Barriers to Discharge: Medical clearance; pending PT/OT; patient choice.    Plan: CM to continue to follow for discharge needs.

## 2022-01-02 NOTE — PROGRESS NOTES
Received report from ROSITA Avina. Safety precautions in place. Bed locked and low. Offered assist. Call light and belongings within reach.

## 2022-01-03 NOTE — PROGRESS NOTES
[facility] Medicine Daily Progress Note    Date of Service  1/3/2022    Chief Complaint  Shira Colón is a 87 y.o. female admitted 12/31/2021 with weakness and back pain.    Hospital Course  Shira Colón is a 87 y.o. female with a past medical history of hypertension, gastroesophageal reflux disease, chronic kidney disease stage III, gout who presented 12/31/2021 with nausea vomiting, falls and back pain.  Patient reports that she has been having multiple falls over the past 3 weeks.  Since then she has been having lower back pain.  Pain is described as moderate-severe.   She also has been noticing reduced appetite and nausea with a few episodes of emesis since she has been falling.  Imaging shows sacral fractures.          Interval Problem Update  1/1 Patient states she feels okay but not significantly better since admission.  PT/OT evaluations pending for dispo recommendations.  I anticipate placement will be needed and patient is willing.  Renal function has not yet improved with IV hydration.  I've increased the rate of IVF to 100cc/h.  Renal US pending but she is urinating without issue.  1/2 Patient finally sleeping soundly, was having increased pain per RN.  Tramadol PRN ordered for increased pain.  Spoke with daughter at bedside, patient has good support at home as she lives with her son and daughter is here from [place] also helping out.  Pleural effusion seen on renal US so I ordered CXR which confirms bilateral mld effusions, will decrease IVF rate to 50cc/hour.  Renal function improving and elevated K resolved.   1/3 Patient awake today and had a difficult night of sleeping as she slept most of the day yesterday.  PT/OT evaluation completed and agree with home health and supportive family.  They also recommended wheel chair for mobility aid. I ordered both of these.  Patient's renal function has improved and will continue with lower dose IVF today and I anticipate she will be ready to discharge  home tomorrow vs Wednesday.  Discussed with son at bedside.     I have personally seen and examined the patient at bedside. I discussed the plan of care with patient, bedside RN, charge RN,  and pharmacy.    Consultants/Specialty  none    Code Status  Full Code    Disposition  Patient is not medically cleared for discharge.   Anticipate discharge to to skilled nursing facility.  I have placed the appropriate orders for post-discharge needs.    Review of Systems  Review of Systems   Constitutional: Negative for chills and fever.   HENT: Negative for congestion and sore throat.    Eyes: Negative for blurred vision and photophobia.   Respiratory: Negative for cough and shortness of breath.    Cardiovascular: Negative for chest pain, claudication and leg swelling.   Gastrointestinal: Negative for abdominal pain, constipation, diarrhea, heartburn and vomiting.   Genitourinary: Negative for dysuria and hematuria.   Musculoskeletal: Positive for back pain. Negative for joint pain and myalgias.   Skin: Negative for itching and rash.   Neurological: Positive for weakness. Negative for dizziness, sensory change, speech change and headaches.   Psychiatric/Behavioral: Negative for depression. The patient is not nervous/anxious and does not have insomnia.         Physical Exam  Temp:  [36.3 °C (97.3 °F)-36.7 °C (98 °F)] 36.4 °C (97.6 °F)  Pulse:  [] 96  Resp:  [16-18] 16  BP: (102-131)/(49-52) 102/52  SpO2:  [93 %-97 %] 93 %    Physical Exam  Vitals and nursing note reviewed.   Constitutional:       General: She is not in acute distress.     Appearance: Normal appearance. She is not ill-appearing.   HENT:      Head: Normocephalic and atraumatic.      Nose: Nose normal.   Eyes:      General: No scleral icterus.  Cardiovascular:      Rate and Rhythm: Normal rate and regular rhythm.      Heart sounds: Normal heart sounds. No murmur heard.      Pulmonary:      Effort: Pulmonary effort is normal.      Breath sounds:  Normal breath sounds.   Abdominal:      General: Bowel sounds are normal. There is no distension.      Palpations: Abdomen is soft.   Musculoskeletal:         General: No swelling or tenderness.      Cervical back: Neck supple.   Skin:     General: Skin is warm and dry.   Neurological:      General: No focal deficit present.      Mental Status: She is alert and oriented to person, place, and time.   Psychiatric:         Mood and Affect: Mood normal.         Fluids  No intake or output data in the 24 hours ending 01/03/22 1210    Laboratory  Recent Labs     12/31/21  2354 01/02/22  0230 01/03/22  0216   WBC 10.6 7.7 12.9*   RBC 4.19* 4.22 4.17*   HEMOGLOBIN 13.0 13.3 13.3   HEMATOCRIT 39.8 40.7 40.7   MCV 95.0 96.4 97.6   MCH 31.0 31.5 31.9   MCHC 32.7* 32.7* 32.7*   RDW 49.6 50.5* 51.5*   PLATELETCT 143* 118* 155*   MPV 10.5 10.2 10.8     Recent Labs     12/31/21 2354 01/02/22  0230 01/03/22  0216   SODIUM 133*  133* 136 135   POTASSIUM 5.7*  5.7* 4.9 4.6   CHLORIDE 100  100 104 104   CO2 20  20 19* 18*   GLUCOSE 106*  106* 116* 103*   BUN 83*  83* 82* 75*   CREATININE 4.24*  4.23* 3.29* 2.50*   CALCIUM 8.8  8.7 8.4 8.2*                   Imaging  DX-CHEST-PORTABLE (1 VIEW)   Final Result      Bibasilar interstitial opacities which may represent interstitial edema with small bilateral pleural effusions. Findings may be accentuated by shallow inspiration.      US-RENAL   Final Result      No hydronephrosis      Mild renal cortical thinning is compatible with age related atrophy      Small-medium volume ascites appears slightly nonsimple and could be due to late subacute hemorrhage. Infectious origin is not excluded. The volume of ascites is likely too small to aspirate      Small right pleural effusion      CT-LSPINE W/O PLUS RECONS   Final Result      1.  Sacral fractures seen involving the dorsal aspect of S1 segment, with comminuted and displaced fractures involving S2 sacral segment extending into RIGHT  sacral ala.   2.  Probable chronic moderate to severe compression of L1.   3.  Multilevel degenerative change of lumbar spine with associated mild anterolisthesis at L4-5.           Assessment/Plan  * Hyperkalemia- (present on admission)  Assessment & Plan  Resolved  Decrease IVF rate due to pleural effusions      Pleural effusion  Assessment & Plan  Mild bilateral on CXR  Reduce IV fluid resuscitation as renal function is improving   Maintaining adequate oxygenation on 2L.      Sacral fractures- (present on admission)  Assessment & Plan  Imaging shows evidence for sacral fractures seen involving the dorsal aspect of S1 segment, with comminuted and displaced fractures involving S2 sacral segment extending into RIGHT sacral ala.   Multimodal pain control, avoid narcotics  Tylenol and tramadol prn  WBAT  PT/OT evaluations, okay with home health as patient has great family support at home.   Wheelchair recommended - will order.          Acute kidney injury on pre-existing chronic kidney disease stage IIIa- (present on admission)  Assessment & Plan  Improving cr  Intravenous fluids to continue at slower rate  Avoid nephrotoxins, monitor inputs and outputs  Renal ultrasound showed age related changes, no obstructions/hydro.    Hx of gout- (present on admission)  Assessment & Plan  Hold home allopurinol given her acute kidney injury    Essential hypertension- (present on admission)  Assessment & Plan  Resume home carvedilol with hold parameters.  We will hold her on furosemide and lisinopril given her acute kidney injury    Gastroesophageal reflux disease without esophagitis- (present on admission)  Assessment & Plan  Resume home omeprazole    Stage 3a chronic kidney disease (HCC)- (present on admission)  Assessment & Plan  With acute kidney injury.  Avoid nephrotoxins as much as possible, renally dose medications, monitor inputs and outputs        VTE prophylaxis: heparin ppx    I have performed a physical exam and  reviewed and updated ROS and Plan today (1/3/2022). In review of yesterday's note (1/2/2022), there are no changes except as documented above.

## 2022-01-03 NOTE — PROGRESS NOTES
Telemetry Shift Summary     Rhythm: Paced   Rate: 94-99  Measurements: .16/.16/.40  Ectopy (reported by Monitor Tech): r PVC, r Coup     Normal Values  Rhythm: Sinus  HR:   Measurements: 0.12-0.20/0.06-0.10/0.30-0.52

## 2022-01-03 NOTE — PROGRESS NOTES
Received bedside report from RN Barbara, pt care assumed. VSS, pt assessment complete. Pt A&Ox4, moderate c/o 6/10 pain at this time, PRN pain medication given per MAR. POC discussed with pt and verbalizes no questions. Pt denies any additional needs at this time. Bed locked and in lowest position, bed alarm is on. Pt educated on fall risk and verbalized understanding, call light within reach, hourly rounding initiated.

## 2022-01-03 NOTE — DISCHARGE PLANNING
Anticipated Discharge Disposition: Home with HH vs SNF    Action: LSW met with pt and pt's dtr Virginie. Pt tired and requested this LSW speak with Virginie.     Virginie verified address and PCP on face sheet. Per Virginie, pt lives with her son Evaristo. Pt was previously independent with walker.    LSW explained HH vs SNF. Per Virginie, she would like to discuss options with pt and Evaristo. LSW provided HH list to Virginie. Virginie already has choice for SNF if they chose SNF.     Barriers to Discharge: POC/GOC    Plan: LSW to follow up with pt and family for POC.    1550: LSW met with Virginie and Evaristo in family room. LSW explained HH vs SNF. Evaristo would like clarification on how pt will progress with therapy. LSW messaged Dr. Ta. Per Dr. Ta, this can be addressed tomorrow. LSW to follow up with POC tomorrow.    Care Transition Team Assessment    Information Source  Information Given By: Relative  Informant's Name: Virginie- dtr  Who is responsible for making decisions for patient? : Patient    Readmission Evaluation  Is this a readmission?: No    Elopement Risk  Legal Hold: No  Ambulatory or Self Mobile in Wheelchair: No-Not an Elopement Risk  Elopement Risk: Not at Risk for Elopement    Interdisciplinary Discharge Planning  Lives with - Patient's Self Care Capacity: Adult Children  Housing / Facility: [address] [facility]  Prior Services: None  Durable Medical Equipment: Walker    Discharge Preparedness  What is your plan after discharge?: Uncertain - pending medical team collaboration  What are your discharge supports?: Child  Prior Functional Level: Ambulatory    Functional Assesment  Prior Functional Level: Ambulatory    Finances  Financial Barriers to Discharge: No              Advance Directive  Advance Directive?: None    Domestic Abuse  Have you ever been the victim of abuse or violence?: No    Psychological Assessment  History of Substance Abuse: None    Discharge Risks or Barriers  Discharge risks or  barriers?: No  Patient risk factors: Vulnerable adult

## 2022-01-03 NOTE — THERAPY
Physical Therapy   Initial Evaluation     Patient Name: Shira Colón  Age:  87 y.o., Sex:  female  Medical Record #: [id number]  Today's Date: 1/3/2022     Precautions  Precautions: (P) Fall Risk;Weight Bearing As Tolerated Right Lower Extremity;Weight Bearing As Tolerated Left Lower Extremity  Comments: (P) S1-S2 fracture     Assessment  Patient is 87 y.o. female who was recently admitted for nausea and vomiting secondary to hyperkalemia. Pt also presented with back pain secondary to GLF from 12/11 and has shown S1-S2 sacral fractures on imaging. Pt is ordered to be WBAT in B LE at this time. Pt presented to PT with impaired balance, impaired gait, weakness, pain, and poor upright activity tolerance at this time. These functional impairments are limiting the patients ability to safely perform bed mobility, ambulation, transfers, and sit<>stands at her prior level of function. Pt and son want pt to d/c back home. Son states he will be able to provide 24/7 assist upon d/c to home. If 24/7 assist is available pt may be able to d/c home with recommendations for HH therapy services, FWW use, w/c use for long distance mobility, bed railing for bed mobility assistance, and ramp installation to manage steps into home. Will continue to follow while in house for continued therapy services.     Plan    Recommend Physical Therapy 4 times per week until therapy goals are met for the following treatments:  Bed Mobility, Community Re-integration, Equipment, Gait Training, Manual Therapy, Neuro Re-Education / Balance, Self Care/Home Evaluation, Stair Training, Therapeutic Activities and Therapeutic Exercises    DC Equipment Recommendations: (P) Front-Wheel Walker,Wheelchair,Ramp (bed side rail)  Discharge Recommendations: (P) Recommend home health for continued physical therapy services (along with 24/7 assist from son )     Objective       01/03/22 1035   Initial Contact Note    Initial Contact Note Order Received and Verified,  Physical Therapy Evaluation in Progress with Full Report to Follow.   Precautions   Precautions Fall Risk;Weight Bearing As Tolerated Right Lower Extremity;Weight Bearing As Tolerated Left Lower Extremity   Comments S1-S2 fracture    Vitals   O2 (LPM) 2   O2 Delivery Device Nasal Cannula   Pain 0 - 10 Group   Location Hip   Location Orientation Right;Left   Description Aching;Sore   Therapist Pain Assessment Prior to Activity;During Activity;Post Activity;Nurse Notified;5;6   Prior Living Situation   Prior Services None   Housing / Facility 1 Story House   Steps Into Home 2   Steps In Home 0   Bathroom Set up Walk In Shower;Bathtub / Shower Combination;Shower Chair;Grab Bars   Equipment Owned 4-Wheel Walker;Tub / Shower Seat;Grab Bar(s) In Tub / Shower   Lives with - Patient's Self Care Capacity Adult Children   Comments son will be able to provide 24/7 assist as needed; states dtr will also be in town to assist   Prior Level of Functional Mobility   Bed Mobility Independent   Transfer Status Independent   Ambulation Independent   Distance Ambulation (Feet)   (household distances )   Assistive Devices Used 4-Wheel Walker   Stairs Independent   Comments reports of an IPLOF    History of Falls   History of Falls Yes   Date of Last Fall 12/11/21   Cognition    Cognition / Consciousness WDL   Comments pleasant/cooperative; motivated    Passive ROM Lower Body   Passive ROM Lower Body WDL   Active ROM Lower Body    Active ROM Lower Body  WDL   Strength Lower Body   Lower Body Strength  X   Gross Strength Generalized Weakness, Equal Bilaterally   Comments limited due to pain primarily; able to demo functional strength of 4/5 in B LE   Sensation Lower Body   Lower Extremity Sensation   WDL   Lower Body Muscle Tone   Lower Body Muscle Tone  WDL   Strength Upper Body   Upper Body Strength  X   Gross Strength Generalized Weakness, Equal Bilaterally.    Upper Body Muscle Tone   Upper Body Muscle Tone  WDL   Neurological  Concerns   Neurological Concerns No   Coordination Lower Body    Coordination Lower Body  X   Comments poor footplacement during ambulation    Balance Assessment   Sitting Balance (Static) Fair   Sitting Balance (Dynamic) Fair -   Standing Balance (Static) Fair -   Standing Balance (Dynamic) Fair -   Weight Shift Sitting Fair   Weight Shift Standing Poor   Comments w/fww; significant use of B UE on FWW    Gait Analysis   Gait Level Of Assist Minimal Assist   Assistive Device Front Wheel Walker   Distance (Feet) 20   # of Times Distance was Traveled 2   Deviation Antalgic;Step To;Decreased Heel Strike;Decreased Toe Off   Weight Bearing Status WBAT B LE    Comments cues for increasing foot clearence on R LE   Bed Mobility    Supine to Sit Minimal Assist   Scooting Minimal Assist   Rolling Supervised   Functional Mobility   Sit to Stand Minimal Assist   Bed, Chair, Wheelchair Transfer Minimal Assist   Toilet Transfers Moderate Assist   Transfer Method Stand Step   Mobility EOB, sit<>stand, ambulation, to toilet, transfer to chair    Comments cues for handplacement during sit<>stands    How much difficulty does the patient currently have...   Turning over in bed (including adjusting bedclothes, sheets and blankets)? 3   Sitting down on and standing up from a chair with arms (e.g., wheelchair, bedside commode, etc.) 3   Moving from lying on back to sitting on the side of the bed? 3   How much help from another person does the patient currently need...   Moving to and from a bed to a chair (including a wheelchair)? 3   Need to walk in a hospital room? 3   Climbing 3-5 steps with a railing? 1   6 clicks Mobility Score 16   Activity Tolerance   Sitting in Chair 10 mins   Sitting Edge of Bed 5 mins   Standing 8 mins   Comments limited due to pain    Edema / Skin Assessment   Edema / Skin  Not Assessed   Patient / Family Goals    Patient / Family Goal #1 to go home   Short Term Goals    Short Term Goal # 1 pt will go  supine<>sit w/hob flat and rails down w/spv in 6tx for safe d/c home    Short Term Goal # 2 pt will go sit<>stand w/fww w/spv in 6tx for safe d/c home    Short Term Goal # 3 pt will ambulate for 150ft w/fww w/spv in 6tx for safe d/c home    Short Term Goal # 4 pt will go up/down 2 steps w/spv in 6tx for safe d/c home   Education Group   Education Provided Role of Physical Therapist   Role of Physical Therapist Patient Response Patient;Acceptance;Explanation;Demonstration;Verbal Demonstration;Action Demonstration   Problem List    Problems Pain;Impaired Bed Mobility;Impaired Transfers;Impaired Ambulation;Functional Strength Deficit;Impaired Balance;Impaired Coordination;Decreased Activity Tolerance   Anticipated Discharge Equipment and Recommendations   DC Equipment Recommendations Front-Wheel Walker;Wheelchair;Ramp  (bed side rail)   Discharge Recommendations Recommend home health for continued physical therapy services  (along with 24/7 assist from son )   Interdisciplinary Plan of Care Collaboration   IDT Collaboration with  Nursing;Occupational Therapist;Family / Caregiver   Patient Position at End of Therapy Seated;Call Light within Reach;Tray Table within Reach;Phone within Reach;Family / Friend in Room   Collaboration Comments aware of visit and recs    Session Information   Date / Session Number  1/3-1 (1/4, 1/9)

## 2022-01-03 NOTE — THERAPY
"Occupational Therapy   Initial Evaluation     Patient Name: Shira Colón  Age:  87 y.o., Sex:  female  Medical Record #: [id number]  Today's Date: 1/3/2022     Precautions  Precautions: Fall Risk  Comments: S2 sacral fx from GLF    Assessment    Patient is 87 y.o. female with a diagnosis of hyperkalemia and weakness s/p GLF on 12/11, where pt sustained an S2 sacral fx. Additional factors influencing patient status / progress: HTN, GERD, OA/RA, CKD 3, gout. Pt lives with supportive son, Jose D, and was reportedly I with most ADLs except for showers where she received supervision. She was ambulatory without AD use, up until most recent fall, then started utilizing 4WW at home. Pt presents with post-fall mid/lower back pain, generalized weakness, decreased functional activity tolerance, and impaired mobility. Pt will benefit from acute OT while in house, but will also require  OT for home safety eval and DME recs once medically cleared to dc home. She will also beenfit Son is very involved and well receptive of all techniques, education, and DMEs recommended during this visit.     Plan    Recommend Occupational Therapy 4 times per week until therapy goals are met for the following treatments:  Adaptive Equipment, Community Re-integration, Neuro Re-Education / Balance, Self Care/Activities of Daily Living, Therapeutic Activities and Therapeutic Exercises.    DC Equipment Recommendations: Other (Comments),Wheelchair (bedrail)  Discharge Recommendations: Recommend home health for continued occupational therapy services (along with consistent family support)     Subjective    \"I am hurting, right here (midback).\"     Objective       01/03/22 0943   Prior Living Situation   Prior Services None   Housing / Facility 1 Story House   Steps Into Home 2   Steps In Home 0   Bathroom Set up Walk In Shower;Bathtub / Shower Combination;Shower Chair;Grab Bars   Equipment Owned 4-Wheel Walker;Tub / Shower Seat;Grab Bar(s) In Tub / " Shower  (transport chair)   Lives with - Patient's Self Care Capacity Adult Children   Comments Pt lives with son who is retired and is home 24/7 to assist.   Prior Level of ADL Function   Self Feeding Independent   Grooming / Hygiene Independent   Bathing Requires Assist   Dressing Independent   Toileting Independent   Prior Level of IADL Function   Medication Management Requires Assist   Laundry Dependent   Kitchen Mobility Requires Assist   Finances Requires Assist   Home Management Dependent   Shopping Requires Assist   Prior Level Of Mobility Independent With Device in Home;Supervision With Device in Community   Driving / Transportation Relatives / Others Provide Transportation   Occupation (Pre-Hospital Vocational) Retired Due To Age   History of Falls   History of Falls Yes   Date of Last Fall 12/11/21   Precautions   Precautions Fall Risk   Comments S2 sacral fx from GLF   Pain   Pain Scales 0 to 10 Scale    Intervention Medication (see MAR)   Pain 0 - 10 Group   Location Back   Location Orientation Mid;Lower   Pain Rating Scale (NPRS) 5   Description Aching;Sore   Comfort Goal Comfort with Movement;Sleep Comfortably   Therapist Pain Assessment 6;During Activity;Post Activity Pain Same as Prior to Activity;Nurse Notified   Non Verbal Descriptors   Non Verbal Scale  Restlessness   Cognition    Cognition / Consciousness WDL   Comments A+Ox4, pleasant and cooperative   Active ROM Upper Body   Active ROM Upper Body  X   Dominant Hand Right   Comments arthritic hands and feet   Strength Upper Body   Upper Body Strength  X   Gross Strength Generalized Weakness, Equal Bilaterally.    Balance Assessment   Sitting Balance (Static) Fair   Sitting Balance (Dynamic) Fair -   Standing Balance (Static) Fair -   Standing Balance (Dynamic) Poor +   Weight Shift Sitting Fair   Weight Shift Standing Fair   Bed Mobility    Supine to Sit Minimal Assist   Sit to Supine   (up in recliner post session)   Scooting Minimal Assist    Rolling Supervised   ADL Assessment   Grooming Seated;Minimal Assist   Upper Body Dressing Minimal Assist   Lower Body Dressing Moderate Assist   Toileting Minimal Assist   How much help from another person does the patient currently need...   Putting on and taking off regular lower body clothing? 2   Bathing (including washing, rinsing, and drying)? 2   Toileting, which includes using a toilet, bedpan, or urinal? 3   Putting on and taking off regular upper body clothing? 3   Taking care of personal grooming such as brushing teeth? 3   Eating meals? 4   6 Clicks Daily Activity Score 17   Functional Mobility   Sit to Stand Minimal Assist   Bed, Chair, Wheelchair Transfer Minimal Assist   Toilet Transfers Moderate Assist   Transfer Method Stand Step   Mobility in room with FWW   Distance (Feet) 20   # of Times Distance was Traveled 2   Activity Tolerance   Sitting in Chair 10 mins toilet/recliner; up in recliner post session   Sitting Edge of Bed 5 mins   Standing 10 mins total   Short Term Goals   Short Term Goal # 1 pt will complete ADL txfs using LRAD at CGA level   Short Term Goal # 2 pt will complete FB dressing using PRN AEs at spv level   Short Term Goal # 3 pt will complete G/H standing sinkside x 10mins without SOB/fatigue at spv level   Education Group   Education Provided Role of Occupational Therapist;Home Safety;Transfers;Activities of Daily Living   Role of Occupational Therapist Patient Response Patient;Family;Acceptance;Explanation   Home Safety Patient Response Patient;Family;Acceptance;Explanation;Demonstration   Transfers Patient Response Patient;Family;Acceptance;Explanation;Demonstration;Verbal Demonstration;Reinforcement Needed   ADL Patient Response Patient;Family;Acceptance;Explanation;Verbal Demonstration;Reinforcement Needed   Problem List   Problem List Decreased Active Daily Living Skills;Decreased Homemaking Skills;Decreased Functional Mobility;Decreased Activity Tolerance;Impaired  Postural Control / Balance   Anticipated Discharge Equipment and Recommendations   DC Equipment Recommendations Other (Comments);Wheelchair  (bedrail)   Discharge Recommendations Recommend home health for continued occupational therapy services  (along with consistent family support)   Interdisciplinary Plan of Care Collaboration   IDT Collaboration with  Nursing;Physical Therapist;Family / Caregiver   Patient Position at End of Therapy Seated;Call Light within Reach;Tray Table within Reach;Phone within Reach;Family / Friend in Room   Collaboration Comments RN and son both made aware of OT session and recs   Session Information   Date / Session Number  1/3 #1 (1/4, 1/9)   Priority 3

## 2022-01-03 NOTE — CARE PLAN
The patient is Stable - Low risk of patient condition declining or worsening    Shift Goals  Clinical Goals: IV fluids, pain management   Patient Goals: Rest, comfort     Progress made toward(s) clinical / shift goals:  Patient medicated for pain per MAR. Patient toileted throughout the night with a x2 assist to the bedside commode. Provided warm blankets for comfort.       Problem: Knowledge Deficit - Standard  Goal: Patient and family/care givers will demonstrate understanding of plan of care, disease process/condition, diagnostic tests and medications  Outcome: Progressing     Problem: Psychosocial Needs:  Goal: Ability to cope will improve  Outcome: Progressing     Problem: Urinary Elimination:  Goal: Ability to achieve and maintain adequate renal perfusion and functioning will improve  Outcome: Progressing     Problem: Fall Risk  Goal: Patient will remain free from falls  Outcome: Progressing     Problem: Pain - Standard  Goal: Alleviation of pain or a reduction in pain to the patient’s comfort goal  Outcome: Progressing     Problem: Skin Integrity  Goal: Skin integrity is maintained or improved  Outcome: Progressing       Patient is not progressing towards the following goals:

## 2022-01-03 NOTE — FACE TO FACE
Face to Face Note  -  Durable Medical Equipment    Dayami Ta D.O. - NPI: [id number]  I certify that this patient is under my care and that they have had a durable medical equipment(DME)face to face encounter by myself that meets the physician DME face-to-face encounter requirements with this patient on:    Date of encounter:   Patient:                    MRN:                       YOB: 2022  Shira Colón  [id number]  11/13/1934     The encounter with the patient was in whole, or in part, for the following medical condition, which is the primary reason for durable medical equipment:  Other - sacral fracture    I certify that, based on my findings, the following durable medical equipment is medically necessary:  Wheel Chair.    HOME O2 Saturation Measurements:(Values must be present for Home Oxygen orders)         ,     ,         My Clinical findings support the need for the above equipment due to:  Frequent Falls    Supporting Symptoms: .     ------------------------------------------------------------------------------------------------------------------    Face to Face Supporting Documentation - Home Health    The encounter with this patient was in whole or in part the primary reason for home health admission.    Date of encounter:   Patient:                    MRN:                       YOB: 2022  Shira Colón  [id number]  11/13/1934     Home health to see patient for:  Skilled Nursing care for assessment, interventions & education, Physical Therapy evaluation and treatment and Occupational therapy evaluation and treatment    Skilled need for:  New Onset Medical Diagnosis sacral fractures    Skilled nursing interventions to include:  Comment: .    Homebound evidenced status by:  Needs the assistance of another person in order to leave the home. Leaving home must require a considerable and taxing effort. There must exist a normal inability to leave the  home.    Community Physician to provide follow up care: Titus Cannon M.D.     Optional Interventions    Wound information & treatment:    Home Infusion Therapy orders:    Line/Drain/Airway:    I certify the face to face encounter for this home care referral meets the CMS requirements and the encounter/clinical assessment with the patient was, in whole, or in part, for the medical condition(s) listed above, which is the primary reason for home health care. Based on my clinical findings: the service(s) are medically necessary, support the need for home health care, and the homebound criteria are met.  I certify that this patient has had a face to face encounter by myself.  Dayami Ta D.O. - NPI: [id number]    *Debility, frailty and advanced age in the absence of an acute deterioration or exacerbation of a condition do not qualify a patient for home health.

## 2022-01-04 PROBLEM — R10.9 ABDOMINAL PAIN: Status: ACTIVE | Noted: 2022-01-01

## 2022-01-04 PROBLEM — R19.8 PERFORATED ABDOMINAL VISCUS: Status: ACTIVE | Noted: 2022-01-01

## 2022-01-04 NOTE — CARE PLAN
The patient is Watcher - Medium risk of patient condition declining or worsening    Shift Goals  Clinical Goals: improve renal function  Patient Goals: pain management, rest     Progress made toward(s) clinical / shift goals:  Pt states that pain is currently tolerable. Pt is able to pivot to commode with assistance.     Patient is not progressing towards the following goals:      Problem: Knowledge Deficit - Standard  Goal: Patient and family/care givers will demonstrate understanding of plan of care, disease process/condition, diagnostic tests and medications  Outcome: Progressing     Problem: Psychosocial Needs:  Goal: Ability to cope will improve  Outcome: Progressing     Problem: Urinary Elimination:  Goal: Ability to achieve and maintain adequate renal perfusion and functioning will improve  Outcome: Progressing  Goal: Ability to achieve a balanced intake and output will improve  Outcome: Progressing     Problem: Pain - Standard  Goal: Alleviation of pain or a reduction in pain to the patient’s comfort goal  Outcome: Progressing

## 2022-01-04 NOTE — DISCHARGE PLANNING
Received Choice form at [phone]  Agency/Facility Name: [facility]  Referral sent per Choice form @ [phone]

## 2022-01-04 NOTE — PROGRESS NOTES
Telemetry Shift Summary     Rhythm: Paced   Rate:   Measurements: .16/.16/.36  Ectopy (reported by Monitor Tech): r PVC

## 2022-01-04 NOTE — CARE PLAN
The patient is Stable - Low risk of patient condition declining or worsening    Shift Goals  Clinical Goals: IV fluids, manage pain, PT/OT to work with patient  Patient Goals: Rest comfortably    Progress made toward(s) clinical / shift goals:    Problem: Urinary Elimination:  Goal: Ability to achieve and maintain adequate renal perfusion and functioning will improve  Outcome: Progressing     Problem: Fall Risk  Goal: Patient will remain free from falls  Outcome: Progressing     Problem: Pain - Standard  Goal: Alleviation of pain or a reduction in pain to the patient’s comfort goal  Outcome: Progressing

## 2022-01-04 NOTE — PROGRESS NOTES
"Hospital Medicine Daily Progress Note    Date of Service  1/4/2022    Chief Complaint  Shira Colón is a 87 y.o. female admitted 12/31/2021 with nausea vomiting, lower back pain    Hospital Course  Shira Colón is a 87 y.o. female with a past medical history of hypertension, gastroesophageal reflux disease, chronic kidney disease stage III, gout who presented 12/31/2021 with nausea vomiting, falls and back pain.  Patient reports that she has been having multiple falls over the past 3 weeks.  Since then she has been having lower back pain.  Pain is described as moderate-severe.   She also has been noticing reduced appetite and nausea with a few episodes of emesis since she has been falling.  Imaging shows sacral fractures.        Interval Problem Update  Patient stated she had abdominal pain, was tender on palpation.  WBC elevating 16,000, creatinine improved 2.21.  Started patient on ceftriaxone.  Ordered CT abdomen pelvis which is pending.    1600PM  ADDENDUM:  Patient CT scan showed  - bilateral pleural effusions  - Small hiatal hernia  - free intraperitoneal air prsent, \"may represent perforated viscus\" and \"acute diverticulitis with perforation cannot be excluded\"  -  \"volume of free fluid and free air is more suspicious for an upper abdominal viscus perforation\"  -- I had extensive conversation with patient and two children. Informed her the next process was to speak with a general surgery for possible bowel resection to control her infection.  I switched patient from ceftriaxone to zosyn.  After further discussion, patient agreed her risk for surgery were too high and risk of remaining intubated was likely outcome and possibly passing away.  Mrs. Colón stated this was not her wishes and decided for comfort care and hospice.  Patient's children Evaristo and Virginie agreed in patient's decision.   -- consulted Palliative care for hospice discussion  -- Filled POLST form for DNR/DNI, patient " signed  -- started comfort care orders      I have personally seen and examined the patient at bedside. I discussed the plan of care with patient, family, bedside RN, charge RN,  and pharmacy.    Consultants/Specialty  none    Code Status  Full Code    Disposition  Patient is not medically cleared for discharge.   Anticipate discharge to to home with close outpatient follow-up.  I have placed the appropriate orders for post-discharge needs.    Review of Systems  Review of Systems   Constitutional: Positive for malaise/fatigue. Negative for chills and fever.   Respiratory: Negative for cough and shortness of breath.    Cardiovascular: Negative for chest pain and palpitations.   Gastrointestinal: Positive for abdominal pain and nausea. Negative for constipation and vomiting.   Musculoskeletal: Negative for back pain and joint pain.   Neurological: Positive for weakness. Negative for dizziness and headaches.   All other systems reviewed and are negative.       Physical Exam  Temp:  [36.3 °C (97.4 °F)-36.6 °C (97.9 °F)] 36.4 °C (97.6 °F)  Pulse:  [] 99  Resp:  [18] 18  BP: ()/(45-62) 128/62  SpO2:  [88 %-99 %] 99 %    Physical Exam  Vitals and nursing note reviewed.   Constitutional:       General: She is in acute distress.      Appearance: She is ill-appearing.   HENT:      Head: Normocephalic and atraumatic.   Eyes:      General: No scleral icterus.     Extraocular Movements: Extraocular movements intact.   Cardiovascular:      Rate and Rhythm: Normal rate.      Pulses: Normal pulses.      Heart sounds: Murmur (aortic region, systolic, 4/6) heard.       Pulmonary:      Effort: Pulmonary effort is normal. No respiratory distress.      Breath sounds: Normal breath sounds.   Abdominal:      General: There is no distension.      Tenderness: There is abdominal tenderness.      Comments: Hyperactive BS   Musculoskeletal:         General: No swelling or tenderness. Normal range of motion.       Cervical back: Normal range of motion and neck supple.   Skin:     General: Skin is warm.      Capillary Refill: Capillary refill takes less than 2 seconds.      Coloration: Skin is not jaundiced.      Findings: No erythema.   Neurological:      General: No focal deficit present.      Mental Status: She is alert and oriented to person, place, and time. Mental status is at baseline.      Motor: Weakness present.   Psychiatric:         Mood and Affect: Mood normal.         Behavior: Behavior normal.         Thought Content: Thought content normal.         Judgment: Judgment normal.         Fluids  No intake or output data in the 24 hours ending 01/04/22 1700    Laboratory  Recent Labs     01/02/22  0230 01/03/22  0216 01/04/22  0524   WBC 7.7 12.9* 16.0*   RBC 4.22 4.17* 4.04*   HEMOGLOBIN 13.3 13.3 12.8   HEMATOCRIT 40.7 40.7 41.1   MCV 96.4 97.6 101.7*   MCH 31.5 31.9 31.7   MCHC 32.7* 32.7* 31.1*   RDW 50.5* 51.5* 54.2*   PLATELETCT 118* 155* 167   MPV 10.2 10.8 10.8     Recent Labs     01/02/22  0230 01/03/22  0216 01/04/22  0524   SODIUM 136 135 136   POTASSIUM 4.9 4.6 4.8   CHLORIDE 104 104 108   CO2 19* 18* 17*   GLUCOSE 116* 103* 100*   BUN 82* 75* 80*   CREATININE 3.29* 2.50* 2.21*   CALCIUM 8.4 8.2* 7.9*                   Imaging  CT-ABDOMEN-PELVIS W/O   Final Result      1.  Free intraperitoneal air. No history of recent abdominal surgery is identified in the patient's chart.      2.  This may represent perforated viscus.      3.  Sigmoid diverticulosis. Acute diverticulitis with perforation cannot be excluded. The volume of free fluid and free air is more suspicious for an upper abdominal viscus perforation.      4.  Small bilateral pleural effusions and moderate bibasilar areas of atelectasis or pneumonia.      5.  Small hiatal hernia.      A secure Voalte message was sent to SHERRILL MCDOWELL M.D. on 1/4/2022 3:57 PM. In person discussion is pending.      DX-CHEST-PORTABLE (1 VIEW)   Final Result     "  Bibasilar interstitial opacities which may represent interstitial edema with small bilateral pleural effusions. Findings may be accentuated by shallow inspiration.      US-RENAL   Final Result      No hydronephrosis      Mild renal cortical thinning is compatible with age related atrophy      Small-medium volume ascites appears slightly nonsimple and could be due to late subacute hemorrhage. Infectious origin is not excluded. The volume of ascites is likely too small to aspirate      Small right pleural effusion      CT-LSPINE W/O PLUS RECONS   Final Result      1.  Sacral fractures seen involving the dorsal aspect of S1 segment, with comminuted and displaced fractures involving S2 sacral segment extending into RIGHT sacral ala.   2.  Probable chronic moderate to severe compression of L1.   3.  Multilevel degenerative change of lumbar spine with associated mild anterolisthesis at L4-5.           Assessment/Plan  * Hyperkalemia- (present on admission)  Assessment & Plan  Resolved  Decrease IVF rate due to pleural effusions    Perforated abdominal viscus  Assessment & Plan  Patient CT scan showed  - bilateral pleural effusions  - Small hiatal hernia  - free intraperitoneal air prsent, \"may represent perforated viscus\" and \"acute diverticulitis with perforation cannot be excluded\"  -  \"volume of free fluid and free air is more suspicious for an upper abdominal viscus perforation\"    Patient informed of risks and benefits with surgery, as this is likely only option to control her infection.  Patient opted for comfort care.    Abdominal pain  Assessment & Plan  Patient had symptoms of nausea vomiting on admission.  No CT scan obtained.  Patient has continued abdominal pain, white count elevating.  CT abdomen pelvis pending.  High concern for perforation.  Started ceftriaxone for possible UTI.    Pleural effusion- (present on admission)  Assessment & Plan  Mild bilateral on CXR  Reduce IV fluid resuscitation as renal " function is improving   Maintaining adequate oxygenation on 2L.    Sacral fractures- (present on admission)  Assessment & Plan  Imaging shows evidence for sacral fractures seen involving the dorsal aspect of S1 segment, with comminuted and displaced fractures involving S2 sacral segment extending into RIGHT sacral ala.   Multimodal pain control, avoid narcotics  Tylenol and tramadol prn  WBAT  PT/OT evaluations, okay with home health as patient has great family support at home.   Wheelchair recommended - will order.    Acute kidney injury on pre-existing chronic kidney disease stage IIIa- (present on admission)  Assessment & Plan  Improving cr  Intravenous fluids to continue at slower rate  Avoid nephrotoxins, monitor inputs and outputs  Renal ultrasound showed age related changes, no obstructions/hydro.    Hx of gout- (present on admission)  Assessment & Plan  Hold home allopurinol given her acute kidney injury    Essential hypertension- (present on admission)  Assessment & Plan  Resume home carvedilol with hold parameters.  We will hold her on furosemide and lisinopril given her acute kidney injury    Advanced care planning/counseling discussion  Assessment & Plan  Spent extra time at patient's bedside face to face  -- I had extensive conversation with patient and two children. Informed her the next process was to speak with a general surgery for possible bowel resection to control her infection.  I switched patient from ceftriaxone to zosyn.  After further discussion, patient agreed her risk for surgery were too high and risk of remaining intubated was likely outcome and possibly passing away.  Mrs. Colón stated this was not her wishes and decided for comfort care and hospice.  Patient's children Evaristo and Virginie agreed in patient's decision.   -- consulted Palliative care for hospice discussion  -- Filled POLST form for DNR/DNI, patient signed  -- started comfort care orders  Start time 1600   End time  1700    Gastroesophageal reflux disease without esophagitis- (present on admission)  Assessment & Plan  Resume home omeprazole    Stage 3a chronic kidney disease (HCC)- (present on admission)  Assessment & Plan  With acute kidney injury.  Avoid nephrotoxins as much as possible, renally dose medications, monitor inputs and outputs      VTE prophylaxis: SCDs/TEDs and heparin ppx    I have performed a physical exam and reviewed and updated ROS and Plan today (1/4/2022). In review of yesterday's note (1/3/2022), there are no changes except as documented above.

## 2022-01-05 PROBLEM — R78.81 BACTEREMIA DUE TO GRAM-NEGATIVE BACTERIA: Status: ACTIVE | Noted: 2022-01-01

## 2022-01-05 PROBLEM — K66.8 FREE INTRAPERITONEAL AIR: Status: ACTIVE | Noted: 2022-01-01

## 2022-01-05 PROBLEM — E83.51 HYPOCALCEMIA: Status: ACTIVE | Noted: 2022-01-01

## 2022-01-05 PROBLEM — E43 PROTEIN-CALORIE MALNUTRITION, SEVERE (HCC): Status: ACTIVE | Noted: 2022-01-01

## 2022-01-05 PROBLEM — D69.6 THROMBOCYTOPENIA (HCC): Status: ACTIVE | Noted: 2022-01-01

## 2022-01-05 PROBLEM — K57.90 DIVERTICULOSIS: Chronic | Status: ACTIVE | Noted: 2022-01-01

## 2022-01-05 NOTE — DISCHARGE PLANNING
Received Choice form at 1704  Agency/Facility Name: [facility]  Referral sent per Choice form @ Unable to fax referral.  Currently no hospice order.

## 2022-01-05 NOTE — HOSPICE
Virginie called back.  She is agreeable to hospice.  DME will be ordered in the morning for delivery before noon.  Please setup transport for 1300. Pt is going to [address] [facility] [place].    Need choice sent.

## 2022-01-05 NOTE — PROGRESS NOTES
Patient lost IV access at shift change. RN attempted to get PIV access but was unsuccessful. Dr. Amaya notified and ordered PO liquid Morphine to relieve patient's pain until PIV access is obtained.

## 2022-01-05 NOTE — PROGRESS NOTES
Call placed to [employer] ([phone]) for notary purposes as requested by family. They can come tomorrow morning at 9:30, family made aware of this and all info.

## 2022-01-05 NOTE — THERAPY
Missed Therapy     Patient Name: Shira Colón  Age:  87 y.o., Sex:  female  Medical Record #: [id number]  Today's Date: 1/4/2022    Discussed missed therapy with RN        01/04/22 1601   Treatment Variance   Reason For Missed Therapy Medical - Other (Please Comment)   Interdisciplinary Plan of Care Collaboration   Collaboration Comments OT tx attempted x2 pt initially out for CT and then attempted after pt retured however pt was very tired and asleep, family was at bedside and also asked to allow pt to rest. OT will follow up first thing tomorrow am    Session Information   Date / Session Number  1/4 attempted   (1/3 #1 (1/4, 1/9))   Priority 4  (see in AM )

## 2022-01-05 NOTE — PROGRESS NOTES
Report received from ROSITA Neal. Pt is resting comfortably in bed with family at bedside, safety precautions in place, and call light within reach. Bereavement tray ordered for family, NAM notified.

## 2022-01-05 NOTE — ASSESSMENT & PLAN NOTE
Patient had symptoms of nausea vomiting on admission.  No CT scan obtained.  Patient has continued abdominal pain, white count elevating.  CT abdomen pelvis pending.  High concern for perforation.  Started ceftriaxone for possible UTI.

## 2022-01-05 NOTE — DISCHARGE SUMMARY
Death Summary    Cause of Death  Apnea due to respiratory failure due to sepsis due to intrabdominal infection with free intraperitoneal air due to perforated abdominal viscus (R19.8).    Comorbid Conditions at the Time of Death  Principal Problem:    Hyperkalemia POA: Yes  Active Problems:    Advanced care planning/counseling discussion POA: No      Overview: 6/3/15, 11/29/17, 5/20/20    Acute kidney injury on pre-existing chronic kidney disease stage IIIa POA: Yes    Pleural effusion POA: Yes    Abdominal pain POA: Clinically Undetermined    Perforated abdominal viscus POA: Yes    Bacteremia due to Gram-negative bacteria POA: Clinically Undetermined    Protein-calorie malnutrition, severe (HCC) POA: Yes    Free intraperitoneal air POA: Clinically Undetermined    Hyponatremia POA: Unknown    Leukocytosis POA: Yes    Hypocalcemia POA: Yes    Thrombocytopenia (HCC) POA: Yes    Stage 3a chronic kidney disease (HCC) POA: Yes    Gastroesophageal reflux disease without esophagitis (Chronic) POA: Yes    Essential hypertension (Chronic) POA: Yes    Hx of gout POA: Yes      Overview: In ankle    Sacral fractures POA: Yes    Diverticulosis (Chronic) POA: Yes  Resolved Problems:    * No resolved hospital problems. *    History of Presenting Illness and Hospital Course  This is Shira Colón is a 87 y.o. female with a past medical history of hypertension, gastroesophageal reflux disease, chronic kidney disease stage III, gout who was admitted on 12/31/2021 with nausea vomiting, falls and back pain.  Patient has had multiple falls in the past 3 weeks, causing her to have lower back pain.  She was found to have sacral fractures and chronic moderate to severe compression of L1 on 12/31/2021.  Patient also was found to have thrombocytopenia 156 down to 118, acute on chronic renal failure stage IIIa creatinine 4.24, hyperkalemia potassium 5.7, significant protein malnutrition, hyponatremia 133, leukocytosis 14.3.  Upon  admission, patient also had reported reduced appetite with nausea and vomiting.      Patient's leukocytosis had resolved went down to 7.7, afterwards began to increase again.  On my personal review of CT of lumbar spine for reported falls, radiologist did not mention on their report, but visible small ascites is present.  Patient began to show signs of lethargy and increased generalized weakness.  Patient was anticipating going home with home health but had reports of feeling uneasy.      On 01/04/2022 patient had complained of significant abdominal pain.  CT abdomen pelvis was performed and found to have free intraperitoneal air, possibility of perforated viscus, acute diverticulitis with perforation cannot be excluded as per radiology report.    We had a long long discussion for possible surgery to find perforated viscus.  Risk and benefits were discussed including possibility of colostomy, significant pain from procedure, possibility of remaining intubated given patient's weakened state and multiple morbidities, Ms. Colón and her 2 children Virginie and Evaristo, ultimately decided that undergoing surgery would be too extensive for Ms. Colón and she elected for comfort care.  Comfort care was initiated on 01/04/2022, POLST form was signed for DNR/DNI by Ms. Colón.    After Ms. Colón passed away, reviewing results in the morning, blood culture 1 set came back positive for gram-negative rods.  Pending complete results.    Death Date: 01/05/22   Death Time: 0346         Pronounced By (RN1): ROSITA Henson  Pronounced By (RN2): ROSITA Busby

## 2022-01-05 NOTE — PROGRESS NOTES
Patient is on comfort care. Time of death 0346. 2 RN pronounced with this RN and ROSITA Busby. Son and daughter at bedside at time of death. MD (Dr. Owens), RANJIT (Brad), and charge nurse (Rubin) notified.

## 2022-01-05 NOTE — ASSESSMENT & PLAN NOTE
"Patient CT scan showed  - bilateral pleural effusions  - Small hiatal hernia  - free intraperitoneal air prsent, \"may represent perforated viscus\" and \"acute diverticulitis with perforation cannot be excluded\"  -  \"volume of free fluid and free air is more suspicious for an upper abdominal viscus perforation\"    Patient informed of risks and benefits with surgery, as this is likely only option to control her infection.  Patient opted for comfort care.  "

## 2022-01-05 NOTE — ASSESSMENT & PLAN NOTE
Spent extra time at patient's bedside face to face  -- I had extensive conversation with patient and two children. Informed her the next process was to speak with a general surgery for possible bowel resection to control her infection.  I switched patient from ceftriaxone to zosyn.  After further discussion, patient agreed her risk for surgery were too high and risk of remaining intubated was likely outcome and possibly passing away.  Mrs. Colón stated this was not her wishes and decided for comfort care and hospice.  Patient's children Evaristo and Virginie agreed in patient's decision.   -- consulted Palliative care for hospice discussion  -- Filled POLST form for DNR/DNI, patient signed  -- started comfort care orders  Start time 1600   End time 1700

## 2022-01-05 NOTE — PROGRESS NOTES
Karen from Lab called with critical result of positive blood culture of gram negative rods. Critical lab result read back to Karen. Patient is comfort care.

## 2022-01-05 NOTE — CONSULTS
"Reason for PC Consult: Advance Care Planning    Consulted by: Dr. Castillo    Assessment:  General: 86 y/o woman admitted with N/V, weakness and falls. Per Dr. Castillo, pt found to have \"free intraperitoneal air present, 'may represent perforated viscus' and 'acute diverticulitis with perforation cannot be excluded\" and also noted \"volume of free fluid and free air is more suspicious for an upper abdominal viscus perforation.\" Option were discussed with MD, family and patient and decision was made for comfort care and hospice. PMHx of hypertension, gastroesophageal reflux disease, chronic kidney disease stage III, gout. PC asked to assist with hospice plan.     Prior PC Consults: n/a    Social: Pt resides with her son Evaristo and has a dtr Virginie as well. Pt wa independent aside from using at walker at home.     Consults: n/a    Dyspnea: No-    Last BM: 01/04/22-    Pain: No-    Depression: Mood appropriate for situation-    Dementia: No;       Spiritual:  Is Caodaism or spirituality important for coping with this illness? No-    Has a  or spiritual provider visit been requested? No    Palliative Performance Scale: 50%    Advance Directive: None-    DPOA: No-    POLST: Yes-      Code Status: Full- changing to comfort care    Outcome:  PC RN approached by MD to assist with hospice plan for this family. PC met with Shira dtr Virginie and son Evaristo at bedside and explained the role of PC to help with discussions about the current medical situation and goals moving forward.. They denied any questions about the medical situation and were in agreement with the hospice plan. Hospice choice discussed and Virginie chose #1 [facility], #2 [facility] and #3 [facility]. Virginie aware that the team will reach out with her to discuss POC; she was appreciative. [place] faxed to [phone] and call placed to Lyly with [facility] to apprise her of the new referral and situation.     POLST was completed by MD and signed by " patient; original on chart and copy to be scanned into EMR. Updates to Virginie on discussion with [facility] and to expect a call from them to discuss. No other questions at this time. Family coordinating notary for completion of documents. While talking to Shira's family, PC RN provided therapeutic communication, including open ended questions, reflective listening, and normalization of thought and feelings throughout encounter, as well as reinforced her goals of care. PC contact information provided to Virginie and encouraged to call with any questions or concerns.     Recommendations: I recommend a hospice consult.    Updated: MD/RN    Plan: hospice to f/u with pt/family on DC home with hospice    Thank you for allowing Palliative Care to support this patient and family. Contact [phone] for additional assistance, change in patient status, or with any questions/concerns.

## 2022-01-05 NOTE — CARE PLAN
The patient is Stable - Low risk of patient condition declining or worsening    Shift Goals  Clinical Goals: Manage Pain/have a bowel movement  Patient Goals: Pain management/rest  Family Goals: Rest    Progress made toward(s) clinical / shift goals:    Problem: Urinary Elimination:  Goal: Ability to achieve a balanced intake and output will improve  Outcome: Not Met     Problem: Knowledge Deficit - Standard  Goal: Patient and family/care givers will demonstrate understanding of plan of care, disease process/condition, diagnostic tests and medications  Outcome: Progressing       Patient is not progressing towards the following goals:      Problem: Urinary Elimination:  Goal: Ability to achieve a balanced intake and output will improve  Outcome: Not Met     Problem: Pain - Standard  Goal: Alleviation of pain or a reduction in pain to the patient’s comfort goal  Outcome: Not Met

## 2022-01-08 LAB
BACTERIA BLD CULT: ABNORMAL
BACTERIA BLD CULT: ABNORMAL
SIGNIFICANT IND 70042: ABNORMAL
SITE SITE: ABNORMAL
SOURCE SOURCE: ABNORMAL

## 2022-01-13 NOTE — DOCUMENTATION QUERY
[facility]                                                                       Query Response Note      PATIENT:               АЛЕКСАНДР HARRINGTON  ACCT #:                  [id number]  MRN:                     [id number]  :                      1934  ADMIT DATE:       2021 3:09 PM  DISCH DATE:        2022 3:46 AM  RESPONDING  PROVIDER #:        [id number]           QUERY TEXT:    It is unclear whether Sepsis was present on admission.  Your help is needed.  Please clarify the POA status.     NOTE:  If an appropriate response is not listed below, please respond with a new note.        The patient's Clinical Indicators include:  Clinical indicators    H&P :  pulse 96, resp 22, WBC 14.3;  hyperkalemia, AMILCAR/CKD, sacral fracture    Cause of Death: Apnea due to respiratory failure due to sepsis due to intrabdominal infection with free intraperitoneal air due to perforated abdominal viscus.    Ceftrixone and Zosyn started on 22     Treatment or Monitoring  PN 22: WBC elevating 16,000, creatinine improved 2.21. On 2022 patient had complained of significant abdominal pain.  CT abdomen pelvis was performed and found to have free intraperitoneal air, possibility of perforated viscus, acute diverticulitis with perforation cannot be excluded as per radiology report.    Risk Factors  Started patient on ceftriaxone, switched to Zosyn on 22  Per DCS: reviewing results in the morning, blood culture 1st set came back positive for gram-negative rods.  Pending complete results    C[employer]ers contact information  [email or web address]  Options provided:   -- The condition of sepsis was present at the time of inpatient admission   -- The condition of Sepsis was not present at the time of inpatient admission   -- The provider is unable to clinically determine whether condition of Sepsis was present on admission or  not      Query created by: Angie King on 1/12/2022 3:32 PM    RESPONSE TEXT:    The condition of Sepsis was not present at the time of inpatient admission          Electronically signed by:  DONNA YE MD 1/13/2022 8:31 AM

## 2022-01-20 NOTE — DOCUMENTATION QUERY
Pending sale to [facility]                                                                       Query Response Note      PATIENT:               АЛЕКСАНДР HARRINGTON  ACCT #:                  [id number]  MRN:                     [id number]  :                      1934  ADMIT DATE:       2021 3:09 PM  DISCH DATE:        2022 3:46 AM  RESPONDING  PROVIDER #:        [id number]           QUERY TEXT:    Please comment after study, the most appropriate diagnosis with regards to the patient?s symptoms at admission and the etiology     The patient's clinical indicators include:  - Findings:  Patient came to ER due to abdominal pain, nausea and vomiting, lack of appetite.  Per H&P the patient had hyperkalemia and AMILCAR both related to dehydration.  Patient also had sacral fractures which were being treated prior to admission with pain medication.    Patient had elevated WBC?s, treated with IV fluids, IV antibiotics, and continued to complain of abdominal pain.     Per her 22 Renal Ultrasound it showed, Small to medium volume ascites appears slightly non-simple and could be due to late subacute hemorrhage.  Infectious origin is not excluded.  On 22, the patient continued to complain of abdominal pain, and a CT of her abdomen was done which showed per DC Summary ?found to have free intraperitoneal are, possibility of perforated viscus, acute diverticulitis with peforation cannot be excluded.?      Per discussion with family ?for possible surgery to find perforated viscus.?  The decision was made to not operate and the patient passed on 2022.      - Treatments:  antibiotics, CT abdomen, repeat labs, comfort care     - Risk factors:  advanced age, pleural effusions, perforated viscus, elevated WBC, sacral fractures     Thank You,  Mica Tamayo RN  Clinical Documentation   [email or web address]  Connect via Vo[employer] Messenger  Options  provided:   -- Abdominal pain, nausea and vomiting, AMILCAR related to probable perforated viscous present on admission   -- Other explanation with regards to the patient's symptoms at admission and the etiology, please specify symptoms and the etiology   -- Unable to determine      Query created by: Mica Tamayo on 1/19/2022 5:21 PM    RESPONSE TEXT:    Other explanation with regards to the patient's symptoms at admission and the etiology- Acute kidney injury & hyperkalemia secondary to nausea/vomiting secondary to severe back pain secondary to sacral fractures          Electronically signed by:  DONNA YE MD 1/19/2022 5:57 PM

## 2022-01-20 NOTE — DOCUMENTATION QUERY
[facility]                                                                       Query Response Note      PATIENT:               АЛЕКСАНДР HARRINGTON  ACCT #:                  [id number]  MRN:                     [id number]  :                      1934  ADMIT DATE:       2021 3:09 PM  DISCH DATE:        2022 3:46 AM  RESPONDING  PROVIDER #:        [id number]           QUERY TEXT:    Patient with history of osteoporosis, fell backwards onto her butt 21 as well as multiple falls for 3 weeks prior to admission, found to have sacral fractures.   When both osteoporosis and an injury occur with a resulting fracture further clarification is needed.   Based upon the above referenced clinical indicators and treatment, please provide a corresponding diagnosis if possible     NOTE:  If an appropriate response is not listed below, please respond with a new note.          The patient's Clinical Indicators include:  - Findings:  osteoporosis per H&P by Dr. Christine 21.    - Dexa scan 3/19/21:  According to the World Health Organization classification, bone mineral density of this patient is normal in the lumbar spine and osteopenia with increased risk of fracture in the left femur.    - CT spine 21:  1.  Sacral fractures seen involving the dorsal aspect of S1 segment, with comminuted and displaced fractures involving S2 sacral segment extending into RIGHT sacral ala.  2.  Probable chronic moderate to severe compression of L1.  3.  Multilevel degenerative change of lumbar spine with associated mild anterolisthesis at L4-5.    - Treatments:  CT, multimodal pain control, actermia, calcium carbonate-vitamin D     - Risk factors:  rheumatoid arthritis, osteoporosis/osteopenia, hormone replacement therapy, sacral fractures      Thank You,  Mica Tamayo RN  Clinical Documentation   [email or web address]  Connect via Vo[employer]  Messenger  Options provided:   -- Fracture related to osteoporosis   -- Fracture not related to osteoporosis   -- Other   -- Unable to determine      Query created by: Mica Tamayo on 1/19/2022 5:05 PM    RESPONSE TEXT:    Fracture related to osteoporosis          Electronically signed by:  SHERRILL MCDOWELL MD 1/19/2022 5:11 PM

## 2022-01-20 NOTE — DOCUMENTATION QUERY
[facility]                                                                       Query Response Note      PATIENT:               АЛЕКСАНДР HARRINGTON  ACCT #:                  [id number]  MRN:                     [id number]  :                      1934  ADMIT DATE:       2021 3:09 PM  DISCH DATE:        2022 3:46 AM  RESPONDING  PROVIDER #:        [id number]           QUERY TEXT:    Per Discharge summary cause of death was intra-abdominal infection with free intraperitoneal air due to perforated abdominal viscous, can a further diagnosis for the ?intra-abdominal infection? be made.      The patient's clinical indicators include:  - Findings:  Renal Ultrasound 22:  Small to medium volume ascites appears slightly non-simple and could be due to late subacute hemorrhage.  Infectious origin is not excluded.  CT of abdomen 22:  Bowel: No obstruction. There is sigmoid diverticulosis. Sigmoid perforation is in the differential for the free air.  Ascites is adjacent to the sigmoid colon and the sensitivity for detection of acute or subacute inflammatory change is decreased due to the presence of free fluid.    - Treatment:  comfort care, CT abdomen, antibiotics, decision to not to operate ?due to possibility of colostomy, significant pain from procedure?    - Risk factors:  Patient with chest pain, abdominal pain, and found to have an abdominal viscous perforation      Thank You,  Mica Tamayo RN  Clinical Documentation   [email or web address]  Connect via [email or web address] Messenger  Options provided:   -- Intra-abdominal infection with peritonitis present on admission   -- Intra-abdominal infection without peritonitis   -- Unable to determine      Query created by: Mica Tamayo on 2022 5:13 PM    RESPONSE TEXT:    Unable to determine          Electronically signed by:  SHERRILL MCDOWELL MD 2022 5:18 PM

## (undated) DEVICE — WRAP COBAN SELF-ADHERENT 6 IN X  5YDS STERILE TAN (12/CA)

## (undated) DEVICE — LACTATED RINGERS INJ 1000 ML - (14EA/CA 60CA/PF)

## (undated) DEVICE — ELECTRODE 850 FOAM ADHESIVE - HYDROGEL RADIOTRNSPRNT (50/PK)

## (undated) DEVICE — CATHETER IV 20 GA X 1-1/4 ---SURG.& SDS ONLY--- (50EA/BX)

## (undated) DEVICE — DRAPE C-ARM LARGE 41IN X 74 IN - (10/BX 2BX/CA)

## (undated) DEVICE — SODIUM CHL IRRIGATION 0.9% 1000ML (12EA/CA)

## (undated) DEVICE — PROTECTOR ULNA NERVE - (36PR/CA)

## (undated) DEVICE — MASK ANESTHESIA ADULT  - (100/CA)

## (undated) DEVICE — GLOVE BIOGEL PI ORTHO SZ 9 PF LF

## (undated) DEVICE — SUCTION INSTRUMENT YANKAUER BULBOUS TIP W/O VENT (50EA/CA)

## (undated) DEVICE — CANISTER SUCTION RIGID RED 1500CC (40EA/CA)

## (undated) DEVICE — COTTON ROLL 1 POUND BIOSEAL - (5RL/CA)

## (undated) DEVICE — STAPLER SKIN DISP - (6/BX 10BX/CA) VISISTAT

## (undated) DEVICE — BLADE SURGICAL #15 - (50/BX 3BX/CA)

## (undated) DEVICE — GOWN WARMING STANDARD FLEX - (30/CA)

## (undated) DEVICE — GLOVE BIOGEL ECLIPSE PF LATEX SIZE 8.5 (50PR/BX)

## (undated) DEVICE — PACK UPPER EXTREMITY SM OR - (3/CA)

## (undated) DEVICE — WATER IRRIGATION STERILE 1000ML (12EA/CA)

## (undated) DEVICE — GLOVE BIOGEL INDICATOR SZ 8 SURGICAL PF LTX - (50/BX 4BX/CA)

## (undated) DEVICE — PACK LOWER EXTREMITY - (2/CA)

## (undated) DEVICE — SET EXTENSION WITH 2 PORTS (48EA/CA) ***PART #2C8610 IS A SUBSTITUTE*****

## (undated) DEVICE — PAD PREP 24 X 48 CUFFED - (100/CA)

## (undated) DEVICE — TUBING CLEARLINK DUO-VENT - C-FLO (48EA/CA)

## (undated) DEVICE — CHLORAPREP 26 ML APPLICATOR - ORANGE TINT(25/CA)

## (undated) DEVICE — TOWELS CLOTH SURGICAL - (4/PK 20PK/CA)

## (undated) DEVICE — KIT ANESTHESIA W/CIRCUIT & 3/LT BAG W/FILTER (20EA/CA)

## (undated) DEVICE — GLOVE, LITE (PAIR)

## (undated) DEVICE — DRAPE IOBAN II INCISE 23X17 - (10EA/BX 4BX/CA)

## (undated) DEVICE — ELECTRODE DUAL RETURN W/ CORD - (50/PK)

## (undated) DEVICE — HEAD HOLDER JUNIOR/ADULT

## (undated) DEVICE — BAG SPONGE COUNT 10.25 X 32 - BLUE (250/CA)

## (undated) DEVICE — SPLINT PLASTER 3 IN X 15 IN - (50/BX 12BX/CA)

## (undated) DEVICE — TUBE CONNECTING SUCTION - CLEAR PLASTIC STERILE 72 IN (50EA/CA)

## (undated) DEVICE — HUMID-VENT HEAT AND MOISTURE EXCHANGE- (50/BX)

## (undated) DEVICE — BOVIE NEEDLE TIP INSULATD NON-SAFETY 2CM (50/PK)

## (undated) DEVICE — PADDING CAST 4 IN STERILE - 4 X 4 YDS (24/CA)

## (undated) DEVICE — GLOVE BIOGEL SZ 8 SURGICAL PF LTX - (50PR/BX 4BX/CA)

## (undated) DEVICE — DRAPE LARGE 3 QUARTER - (20/CA)

## (undated) DEVICE — SUTURE 3-0 VICRYL PLUS RB-1 - (36/BX)

## (undated) DEVICE — PADDING CAST 4 IN X 4 YDS - SOF-ROLL (12RL/BG 6BG/CT)

## (undated) DEVICE — NEPTUNE 4 PORT MANIFOLD - (20/PK)

## (undated) DEVICE — SUTURE 4-0 ETHILON FS-1 18 (36PK/BX)"

## (undated) DEVICE — SUTURE 4-0 ETHILON PS-2 18 (12PK/BX)"

## (undated) DEVICE — KIT  I.V. START (100EA/CA)

## (undated) DEVICE — SENSOR SPO2 NEO LNCS ADHESIVE (20/BX) SEE USER NOTES